# Patient Record
Sex: MALE | Race: WHITE | Employment: OTHER | URBAN - METROPOLITAN AREA
[De-identification: names, ages, dates, MRNs, and addresses within clinical notes are randomized per-mention and may not be internally consistent; named-entity substitution may affect disease eponyms.]

---

## 2017-05-25 RX ORDER — LEVOTHYROXINE SODIUM 0.1 MG/1
TABLET ORAL
Qty: 90 TABLET | Refills: 0 | Status: SHIPPED | OUTPATIENT
Start: 2017-05-25 | End: 2017-08-23 | Stop reason: SDUPTHER

## 2017-08-16 ENCOUNTER — HOSPITAL ENCOUNTER (OUTPATIENT)
Dept: GENERAL RADIOLOGY | Age: 67
Discharge: HOME | End: 2017-08-16
Payer: MEDICARE

## 2017-08-16 ENCOUNTER — OFFICE VISIT (OUTPATIENT)
Dept: FAMILY MEDICINE CLINIC | Age: 67
End: 2017-08-16

## 2017-08-16 VITALS
OXYGEN SATURATION: 97 % | HEART RATE: 64 BPM | WEIGHT: 226.8 LBS | SYSTOLIC BLOOD PRESSURE: 138 MMHG | BODY MASS INDEX: 27.62 KG/M2 | HEIGHT: 76 IN | TEMPERATURE: 97.7 F | RESPIRATION RATE: 15 BRPM | DIASTOLIC BLOOD PRESSURE: 68 MMHG

## 2017-08-16 DIAGNOSIS — J20.9 ACUTE BRONCHITIS, UNSPECIFIED ORGANISM: Primary | ICD-10-CM

## 2017-08-16 DIAGNOSIS — J20.9 ACUTE BRONCHITIS, UNSPECIFIED ORGANISM: ICD-10-CM

## 2017-08-16 DIAGNOSIS — R09.89 CHEST RALES: ICD-10-CM

## 2017-08-16 DIAGNOSIS — M54.9 MID BACK PAIN: ICD-10-CM

## 2017-08-16 PROCEDURE — 4040F PNEUMOC VAC/ADMIN/RCVD: CPT | Performed by: FAMILY MEDICINE

## 2017-08-16 PROCEDURE — G8598 ASA/ANTIPLAT THER USED: HCPCS | Performed by: FAMILY MEDICINE

## 2017-08-16 PROCEDURE — 3017F COLORECTAL CA SCREEN DOC REV: CPT | Performed by: FAMILY MEDICINE

## 2017-08-16 PROCEDURE — 99213 OFFICE O/P EST LOW 20 MIN: CPT | Performed by: FAMILY MEDICINE

## 2017-08-16 PROCEDURE — 1036F TOBACCO NON-USER: CPT | Performed by: FAMILY MEDICINE

## 2017-08-16 PROCEDURE — G8427 DOCREV CUR MEDS BY ELIG CLIN: HCPCS | Performed by: FAMILY MEDICINE

## 2017-08-16 PROCEDURE — 71020 XR CHEST STANDARD TWO VW: CPT

## 2017-08-16 PROCEDURE — 1123F ACP DISCUSS/DSCN MKR DOCD: CPT | Performed by: FAMILY MEDICINE

## 2017-08-16 PROCEDURE — G8419 CALC BMI OUT NRM PARAM NOF/U: HCPCS | Performed by: FAMILY MEDICINE

## 2017-08-16 RX ORDER — DEXTROMETHORPHAN HYDROBROMIDE AND PROMETHAZINE HYDROCHLORIDE 15; 6.25 MG/5ML; MG/5ML
5 SYRUP ORAL 4 TIMES DAILY PRN
Qty: 150 ML | Refills: 0 | Status: SHIPPED | OUTPATIENT
Start: 2017-08-16 | End: 2019-05-28 | Stop reason: SDUPTHER

## 2017-08-16 RX ORDER — RANOLAZINE 500 MG/1
TABLET, FILM COATED, EXTENDED RELEASE ORAL
COMMUNITY
Start: 2017-08-08 | End: 2019-05-03 | Stop reason: ALTCHOICE

## 2017-08-16 RX ORDER — CLARITHROMYCIN 500 MG/1
500 TABLET, COATED ORAL 2 TIMES DAILY
Qty: 20 TABLET | Refills: 0 | Status: SHIPPED | OUTPATIENT
Start: 2017-08-16 | End: 2017-08-26

## 2017-08-16 RX ORDER — CARVEDILOL 3.12 MG/1
TABLET ORAL
COMMUNITY
Start: 2017-08-07 | End: 2018-08-15 | Stop reason: ALTCHOICE

## 2017-08-16 RX ORDER — LEVOTHYROXINE SODIUM 0.1 MG/1
TABLET ORAL
Qty: 30 TABLET | Refills: 0 | Status: CANCELLED | OUTPATIENT
Start: 2017-08-16

## 2017-08-16 RX ORDER — AMLODIPINE BESYLATE 5 MG/1
TABLET ORAL
Status: ON HOLD | COMMUNITY
Start: 2017-08-07 | End: 2017-11-01 | Stop reason: HOSPADM

## 2017-08-16 ASSESSMENT — ENCOUNTER SYMPTOMS
ABDOMINAL PAIN: 0
WHEEZING: 0

## 2017-08-21 ENCOUNTER — OFFICE VISIT (OUTPATIENT)
Dept: FAMILY MEDICINE CLINIC | Age: 67
End: 2017-08-21

## 2017-08-21 VITALS
RESPIRATION RATE: 16 BRPM | BODY MASS INDEX: 27.52 KG/M2 | DIASTOLIC BLOOD PRESSURE: 80 MMHG | HEIGHT: 76 IN | WEIGHT: 226 LBS | SYSTOLIC BLOOD PRESSURE: 124 MMHG | HEART RATE: 88 BPM | TEMPERATURE: 98.1 F

## 2017-08-21 DIAGNOSIS — M19.011 PRIMARY OSTEOARTHRITIS OF BOTH SHOULDERS: ICD-10-CM

## 2017-08-21 DIAGNOSIS — Z12.5 PROSTATE CANCER SCREENING: ICD-10-CM

## 2017-08-21 DIAGNOSIS — E03.9 HYPOTHYROIDISM, UNSPECIFIED TYPE: Primary | ICD-10-CM

## 2017-08-21 DIAGNOSIS — E03.9 HYPOTHYROIDISM, UNSPECIFIED TYPE: ICD-10-CM

## 2017-08-21 DIAGNOSIS — M19.012 PRIMARY OSTEOARTHRITIS OF BOTH SHOULDERS: ICD-10-CM

## 2017-08-21 LAB
PROSTATE SPECIFIC ANTIGEN: 0.16 NG/ML (ref 0–6.22)
TSH SERPL DL<=0.05 MIU/L-ACNC: 2.24 UIU/ML (ref 0.27–4.2)

## 2017-08-21 PROCEDURE — 1123F ACP DISCUSS/DSCN MKR DOCD: CPT | Performed by: FAMILY MEDICINE

## 2017-08-21 PROCEDURE — G8419 CALC BMI OUT NRM PARAM NOF/U: HCPCS | Performed by: FAMILY MEDICINE

## 2017-08-21 PROCEDURE — 4040F PNEUMOC VAC/ADMIN/RCVD: CPT | Performed by: FAMILY MEDICINE

## 2017-08-21 PROCEDURE — 3017F COLORECTAL CA SCREEN DOC REV: CPT | Performed by: FAMILY MEDICINE

## 2017-08-21 PROCEDURE — 99213 OFFICE O/P EST LOW 20 MIN: CPT | Performed by: FAMILY MEDICINE

## 2017-08-21 PROCEDURE — 1036F TOBACCO NON-USER: CPT | Performed by: FAMILY MEDICINE

## 2017-08-21 PROCEDURE — G8427 DOCREV CUR MEDS BY ELIG CLIN: HCPCS | Performed by: FAMILY MEDICINE

## 2017-08-21 PROCEDURE — G8598 ASA/ANTIPLAT THER USED: HCPCS | Performed by: FAMILY MEDICINE

## 2017-08-21 ASSESSMENT — PATIENT HEALTH QUESTIONNAIRE - PHQ9
SUM OF ALL RESPONSES TO PHQ QUESTIONS 1-9: 0
SUM OF ALL RESPONSES TO PHQ9 QUESTIONS 1 & 2: 0
1. LITTLE INTEREST OR PLEASURE IN DOING THINGS: 0
2. FEELING DOWN, DEPRESSED OR HOPELESS: 0

## 2017-08-21 ASSESSMENT — ENCOUNTER SYMPTOMS
GASTROINTESTINAL NEGATIVE: 1
ALLERGIC/IMMUNOLOGIC NEGATIVE: 1
RESPIRATORY NEGATIVE: 1
EYES NEGATIVE: 1
SHORTNESS OF BREATH: 0

## 2017-08-23 RX ORDER — LEVOTHYROXINE SODIUM 0.1 MG/1
100 TABLET ORAL DAILY
Qty: 90 TABLET | Refills: 0 | Status: SHIPPED | OUTPATIENT
Start: 2017-08-23 | End: 2018-01-20 | Stop reason: SDUPTHER

## 2017-10-17 ENCOUNTER — OFFICE VISIT (OUTPATIENT)
Dept: FAMILY MEDICINE CLINIC | Age: 67
End: 2017-10-17

## 2017-10-17 VITALS
DIASTOLIC BLOOD PRESSURE: 84 MMHG | RESPIRATION RATE: 18 BRPM | WEIGHT: 228 LBS | BODY MASS INDEX: 27.76 KG/M2 | HEIGHT: 76 IN | SYSTOLIC BLOOD PRESSURE: 132 MMHG | HEART RATE: 74 BPM | TEMPERATURE: 98.1 F | OXYGEN SATURATION: 97 %

## 2017-10-17 DIAGNOSIS — R06.02 SOB (SHORTNESS OF BREATH) ON EXERTION: ICD-10-CM

## 2017-10-17 DIAGNOSIS — R05.9 COUGH: Primary | ICD-10-CM

## 2017-10-17 PROCEDURE — 1036F TOBACCO NON-USER: CPT | Performed by: FAMILY MEDICINE

## 2017-10-17 PROCEDURE — G8484 FLU IMMUNIZE NO ADMIN: HCPCS | Performed by: FAMILY MEDICINE

## 2017-10-17 PROCEDURE — G8427 DOCREV CUR MEDS BY ELIG CLIN: HCPCS | Performed by: FAMILY MEDICINE

## 2017-10-17 PROCEDURE — 1123F ACP DISCUSS/DSCN MKR DOCD: CPT | Performed by: FAMILY MEDICINE

## 2017-10-17 PROCEDURE — 3017F COLORECTAL CA SCREEN DOC REV: CPT | Performed by: FAMILY MEDICINE

## 2017-10-17 PROCEDURE — 4040F PNEUMOC VAC/ADMIN/RCVD: CPT | Performed by: FAMILY MEDICINE

## 2017-10-17 PROCEDURE — 99213 OFFICE O/P EST LOW 20 MIN: CPT | Performed by: FAMILY MEDICINE

## 2017-10-17 PROCEDURE — G8598 ASA/ANTIPLAT THER USED: HCPCS | Performed by: FAMILY MEDICINE

## 2017-10-17 PROCEDURE — G8419 CALC BMI OUT NRM PARAM NOF/U: HCPCS | Performed by: FAMILY MEDICINE

## 2017-10-17 RX ORDER — PREDNISONE 10 MG/1
10 TABLET ORAL DAILY
Qty: 40 TABLET | Refills: 0 | Status: SHIPPED | OUTPATIENT
Start: 2017-10-17 | End: 2017-10-27

## 2017-10-17 RX ORDER — CEFDINIR 300 MG/1
300 CAPSULE ORAL 2 TIMES DAILY
Qty: 20 CAPSULE | Refills: 0 | Status: SHIPPED | OUTPATIENT
Start: 2017-10-17 | End: 2019-05-28 | Stop reason: SDUPTHER

## 2017-10-17 ASSESSMENT — ENCOUNTER SYMPTOMS
COUGH: 1
EYES NEGATIVE: 1
GASTROINTESTINAL NEGATIVE: 1
ALLERGIC/IMMUNOLOGIC NEGATIVE: 1
SHORTNESS OF BREATH: 1

## 2017-10-17 NOTE — PROGRESS NOTES
Patient is seen in follow up for   Chief Complaint   Patient presents with    Cough     x 3 months     Chest Congestion     HPIhere with cough and sob for the last three months. cxr was normal. Felt better with antibiotics    Past Medical History:   Diagnosis Date    CAD (coronary artery disease)     Congenital heart disease     Knee pain      Patient Active Problem List    Diagnosis Date Noted    CAD (coronary artery disease)     Congenital heart disease     Knee pain      Past Surgical History:   Procedure Laterality Date    CARDIAC CATHETERIZATION  05/13/16    DR Dennie Dada    VASECTOMY       No family history on file. Social History     Social History    Marital status:      Spouse name: N/A    Number of children: N/A    Years of education: N/A     Social History Main Topics    Smoking status: Former Smoker     Packs/day: 1.50     Years: 20.00     Types: Cigarettes    Smokeless tobacco: Never Used    Alcohol use 1.2 oz/week     1 Cans of beer, 1 Glasses of wine per week      Comment: rarely     Drug use: No    Sexual activity: Not Asked     Other Topics Concern    None     Social History Narrative    None     Current Outpatient Prescriptions   Medication Sig Dispense Refill    cefdinir (OMNICEF) 300 MG capsule Take 1 capsule by mouth 2 times daily for 10 days 20 capsule 0    predniSONE (DELTASONE) 10 MG tablet Take 1 tablet by mouth daily for 10 days 4 po for 4 days 3 po for 4 days 2 po for 4 days 1 po for 4 days 40 tablet 0    levothyroxine (SYNTHROID) 100 MCG tablet Take 1 tablet by mouth daily 90 tablet 0    carvedilol (COREG) 3.125 MG tablet 1 po bid      RANEXA 500 MG extended release tablet 1 po bid      amLODIPine (NORVASC) 5 MG tablet 1 po qd      PRALUENT 75 MG/ML SOPN injection pen Inject 75 mg once every other week      Ascorbic Acid (VITAMIN C) 500 MG tablet Take 500 mg by mouth daily      Multiple Vitamins-Minerals (CENTRUM SILVER ULTRA MENS PO) Take  by mouth.       aspirin 81 MG tablet Take 81 mg by mouth daily.  vitamin B-12 (CYANOCOBALAMIN) 1000 MCG tablet Take 1,000 mcg by mouth daily.  niacin (NIASPAN) 1000 MG CR tablet Take 1,000 mg by mouth nightly. No current facility-administered medications for this visit. Current Outpatient Prescriptions on File Prior to Visit   Medication Sig Dispense Refill    levothyroxine (SYNTHROID) 100 MCG tablet Take 1 tablet by mouth daily 90 tablet 0    carvedilol (COREG) 3.125 MG tablet 1 po bid      RANEXA 500 MG extended release tablet 1 po bid      amLODIPine (NORVASC) 5 MG tablet 1 po qd      PRALUENT 75 MG/ML SOPN injection pen Inject 75 mg once every other week      Ascorbic Acid (VITAMIN C) 500 MG tablet Take 500 mg by mouth daily      Multiple Vitamins-Minerals (CENTRUM SILVER ULTRA MENS PO) Take  by mouth.  aspirin 81 MG tablet Take 81 mg by mouth daily.  vitamin B-12 (CYANOCOBALAMIN) 1000 MCG tablet Take 1,000 mcg by mouth daily.  niacin (NIASPAN) 1000 MG CR tablet Take 1,000 mg by mouth nightly. No current facility-administered medications on file prior to visit. Allergies   Allergen Reactions    Celebrex [Celecoxib]     Crestor [Rosuvastatin]     Simvastatin      Health Maintenance   Topic Date Due    AAA screen  1950    Hepatitis C screen  1950    DTaP/Tdap/Td vaccine (1 - Tdap) 08/10/1967    Flu vaccine (1) 09/01/2017    Lipid screen  05/04/2021    Colon cancer screen colonoscopy  02/18/2025    Zostavax vaccine  Completed    Pneumococcal low/med risk  Completed       Review of Systems    Review of Systems   Constitutional: Negative for activity change, appetite change, chills, fever and unexpected weight change. HENT: Negative. Eyes: Negative. Respiratory: Positive for cough and shortness of breath. Cardiovascular: Negative. Negative for chest pain and palpitations. Gastrointestinal: Negative. Endocrine: Negative. Genitourinary: Negative. Musculoskeletal: Negative. Skin: Negative. Allergic/Immunologic: Negative. Neurological: Negative. Hematological: Negative. Psychiatric/Behavioral: Negative. Physical Exam  Vitals:    10/17/17 1930   BP: 132/84   Pulse: 74   Resp: 18   Temp: 98.1 °F (36.7 °C)   TempSrc: Tympanic   SpO2: 97%   Weight: 228 lb (103.4 kg)   Height: 6' 4\" (1.93 m)       Physical Exam   Constitutional: He appears well-developed and well-nourished. HENT:   Right Ear: External ear normal.   Left Ear: External ear normal.   Eyes: Conjunctivae are normal. Pupils are equal, round, and reactive to light. Neck: Normal range of motion. Neck supple. No thyromegaly present. Cardiovascular: Normal rate, regular rhythm and normal heart sounds. No murmur heard. Pulmonary/Chest: Effort normal and breath sounds normal.   Abdominal: Soft. Bowel sounds are normal.   Musculoskeletal: Normal range of motion. He exhibits no edema or tenderness. Lymphadenopathy:     He has no cervical adenopathy. Assessment  1. Cough     2. SOB (shortness of breath) on exertion       Problem List     None          Plan  No orders of the defined types were placed in this encounter.     Orders Placed This Encounter   Medications    cefdinir (OMNICEF) 300 MG capsule     Sig: Take 1 capsule by mouth 2 times daily for 10 days     Dispense:  20 capsule     Refill:  0    predniSONE (DELTASONE) 10 MG tablet     Sig: Take 1 tablet by mouth daily for 10 days 4 po for 4 days 3 po for 4 days 2 po for 4 days 1 po for 4 days     Dispense:  40 tablet     Refill:  0     Suspect sinusitis aor allergies contributing  Esther Bustamante MD

## 2017-10-26 ENCOUNTER — PATIENT MESSAGE (OUTPATIENT)
Dept: FAMILY MEDICINE CLINIC | Age: 67
End: 2017-10-26

## 2017-10-26 DIAGNOSIS — R05.9 COUGH: Primary | ICD-10-CM

## 2017-10-26 NOTE — TELEPHONE ENCOUNTER
From: Todd Finch  To: Caridad Jaimes MD  Sent: 10/26/2017 8:48 AM EDT  Subject: RE:Thank you for your visit! [place]  I was wondering if you could go ahead and schedule me for the follow up chest xray? I was not able to take the Prednisone as seemed to increase the chest pain. I will be finishing the Cefdinir tomorrow. I am still getting the chest pains when I over exert myself. I am going to get an appointment with Dr. Radha Morton to have her check out my heart. Thanks  Bonny Howard  ----- Message -----  From: Caridad Jaimes MD  Sent: 10/17/2017 8:00 PM EDT  To: Petros Kaye  Subject: Thank you for your visit!  10/17/2017   Caridad Jaimes MD   [address] [place] [id number]  Dept: [phone]  Dept Fax: [phone]: [phone]     Dear Hamilton Tomlinson,  Thank you for your recent visit. We at [facility] are committed to providing amazing patient care, and would like to make sure we were successful in providing you a great experience at our office. In the coming days, you may receive a survey via mail or email about your experience with my office. We ask that you please take a couple of minutes to complete and return it. We use our patients feedback to make improvements within the office that will make the experience even better for all of our patients. Thank you, and be well!   Caridad Jaimes MD

## 2017-10-28 ENCOUNTER — APPOINTMENT (OUTPATIENT)
Dept: GENERAL RADIOLOGY | Age: 67
DRG: 247 | End: 2017-10-28
Payer: MEDICARE

## 2017-10-28 ENCOUNTER — HOSPITAL ENCOUNTER (EMERGENCY)
Age: 67
Discharge: HOME | DRG: 247 | End: 2017-10-29
Payer: MEDICARE

## 2017-10-28 DIAGNOSIS — R07.9 CHEST PAIN, UNSPECIFIED TYPE: ICD-10-CM

## 2017-10-28 DIAGNOSIS — K22.4 ESOPHAGEAL SPASM: Primary | ICD-10-CM

## 2017-10-28 LAB
ALBUMIN SERPL-MCNC: 4.5 G/DL (ref 3.9–4.9)
ALP BLD-CCNC: 70 U/L (ref 35–104)
ALT SERPL-CCNC: 34 U/L (ref 0–41)
ANION GAP SERPL CALCULATED.3IONS-SCNC: 15 MEQ/L (ref 7–13)
AST SERPL-CCNC: 23 U/L (ref 0–40)
BASOPHILS ABSOLUTE: 0.1 K/UL (ref 0–0.2)
BASOPHILS RELATIVE PERCENT: 0.9 %
BILIRUB SERPL-MCNC: 0.4 MG/DL (ref 0–1.2)
BUN BLDV-MCNC: 17 MG/DL (ref 8–23)
CALCIUM SERPL-MCNC: 9.7 MG/DL (ref 8.6–10.2)
CHLORIDE BLD-SCNC: 98 MEQ/L (ref 98–107)
CO2: 23 MEQ/L (ref 22–29)
CREAT SERPL-MCNC: 1.8 MG/DL (ref 0.7–1.2)
EOSINOPHILS ABSOLUTE: 0.2 K/UL (ref 0–0.7)
EOSINOPHILS RELATIVE PERCENT: 2.5 %
GFR AFRICAN AMERICAN: 45.5
GFR NON-AFRICAN AMERICAN: 37.6
GLOBULIN: 3.1 G/DL (ref 2.3–3.5)
GLUCOSE BLD-MCNC: 104 MG/DL (ref 74–109)
HCT VFR BLD CALC: 39.3 % (ref 42–52)
HEMOGLOBIN: 13.5 G/DL (ref 14–18)
LACTIC ACID: 1.2 MMOL/L (ref 0.5–2.2)
LYMPHOCYTES ABSOLUTE: 2.1 K/UL (ref 1–4.8)
LYMPHOCYTES RELATIVE PERCENT: 23.7 %
MAGNESIUM: 2.2 MG/DL (ref 1.7–2.3)
MCH RBC QN AUTO: 30 PG (ref 27–31.3)
MCHC RBC AUTO-ENTMCNC: 34.5 % (ref 33–37)
MCV RBC AUTO: 87.1 FL (ref 80–100)
MONOCYTES ABSOLUTE: 1.1 K/UL (ref 0.2–0.8)
MONOCYTES RELATIVE PERCENT: 12.5 %
NEUTROPHILS ABSOLUTE: 5.5 K/UL (ref 1.4–6.5)
NEUTROPHILS RELATIVE PERCENT: 60.4 %
PDW BLD-RTO: 12.8 % (ref 11.5–14.5)
PLATELET # BLD: 251 K/UL (ref 130–400)
POTASSIUM SERPL-SCNC: 4.3 MEQ/L (ref 3.5–5.1)
RBC # BLD: 4.51 M/UL (ref 4.7–6.1)
SODIUM BLD-SCNC: 136 MEQ/L (ref 132–144)
TOTAL CK: 212 U/L (ref 0–190)
TOTAL PROTEIN: 7.6 G/DL (ref 6.4–8.1)
TROPONIN: <0.01 NG/ML (ref 0–0.01)
WBC # BLD: 9 K/UL (ref 4.8–10.8)

## 2017-10-28 PROCEDURE — 82553 CREATINE MB FRACTION: CPT

## 2017-10-28 PROCEDURE — 93005 ELECTROCARDIOGRAM TRACING: CPT

## 2017-10-28 PROCEDURE — 71010 XR CHEST PORTABLE: CPT

## 2017-10-28 PROCEDURE — 82550 ASSAY OF CK (CPK): CPT

## 2017-10-28 PROCEDURE — 84484 ASSAY OF TROPONIN QUANT: CPT

## 2017-10-28 PROCEDURE — 83735 ASSAY OF MAGNESIUM: CPT

## 2017-10-28 PROCEDURE — 36415 COLL VENOUS BLD VENIPUNCTURE: CPT

## 2017-10-28 PROCEDURE — 99285 EMERGENCY DEPT VISIT HI MDM: CPT

## 2017-10-28 PROCEDURE — 83605 ASSAY OF LACTIC ACID: CPT

## 2017-10-28 PROCEDURE — 85025 COMPLETE CBC W/AUTO DIFF WBC: CPT

## 2017-10-28 PROCEDURE — 80053 COMPREHEN METABOLIC PANEL: CPT

## 2017-10-28 ASSESSMENT — PAIN DESCRIPTION - LOCATION: LOCATION: CHEST

## 2017-10-28 ASSESSMENT — PAIN SCALES - GENERAL: PAINLEVEL_OUTOF10: 2

## 2017-10-28 ASSESSMENT — PAIN DESCRIPTION - ORIENTATION: ORIENTATION: MID;LEFT

## 2017-10-28 ASSESSMENT — PAIN DESCRIPTION - DESCRIPTORS: DESCRIPTORS: TIGHTNESS

## 2017-10-28 ASSESSMENT — PAIN DESCRIPTION - FREQUENCY: FREQUENCY: INTERMITTENT

## 2017-10-29 VITALS
OXYGEN SATURATION: 96 % | TEMPERATURE: 98 F | HEART RATE: 55 BPM | WEIGHT: 225 LBS | RESPIRATION RATE: 18 BRPM | DIASTOLIC BLOOD PRESSURE: 64 MMHG | HEIGHT: 76 IN | BODY MASS INDEX: 27.4 KG/M2 | SYSTOLIC BLOOD PRESSURE: 110 MMHG

## 2017-10-29 LAB
BILIRUBIN URINE: NEGATIVE
BLOOD, URINE: NEGATIVE
CK MB: 4.8 NG/ML (ref 0–6.7)
CK MB: 4.9 NG/ML (ref 0–6.7)
CLARITY: CLEAR
COLOR: YELLOW
CREATINE KINASE-MB INDEX: 2.3 % (ref 0–3.5)
CREATINE KINASE-MB INDEX: 2.4 % (ref 0–3.5)
EKG ATRIAL RATE: 53 BPM
EKG ATRIAL RATE: 63 BPM
EKG P AXIS: 40 DEGREES
EKG P AXIS: 48 DEGREES
EKG P-R INTERVAL: 204 MS
EKG P-R INTERVAL: 204 MS
EKG Q-T INTERVAL: 410 MS
EKG Q-T INTERVAL: 422 MS
EKG QRS DURATION: 80 MS
EKG QRS DURATION: 86 MS
EKG QTC CALCULATION (BAZETT): 395 MS
EKG QTC CALCULATION (BAZETT): 419 MS
EKG R AXIS: 36 DEGREES
EKG R AXIS: 42 DEGREES
EKG T AXIS: -2 DEGREES
EKG T AXIS: 10 DEGREES
EKG VENTRICULAR RATE: 53 BPM
EKG VENTRICULAR RATE: 63 BPM
GLUCOSE URINE: NEGATIVE MG/DL
KETONES, URINE: NEGATIVE MG/DL
LEUKOCYTE ESTERASE, URINE: NEGATIVE
NITRITE, URINE: NEGATIVE
PH UA: 6 (ref 5–9)
PROTEIN UA: NEGATIVE MG/DL
SPECIFIC GRAVITY UA: 1.01 (ref 1–1.03)
TOTAL CK: 200 U/L (ref 0–190)
TROPONIN: <0.01 NG/ML (ref 0–0.01)
URINE REFLEX TO CULTURE: NORMAL
UROBILINOGEN, URINE: 0.2 E.U./DL

## 2017-10-29 PROCEDURE — 36415 COLL VENOUS BLD VENIPUNCTURE: CPT

## 2017-10-29 PROCEDURE — 81003 URINALYSIS AUTO W/O SCOPE: CPT

## 2017-10-29 PROCEDURE — 82553 CREATINE MB FRACTION: CPT

## 2017-10-29 PROCEDURE — 84484 ASSAY OF TROPONIN QUANT: CPT

## 2017-10-29 PROCEDURE — 82550 ASSAY OF CK (CPK): CPT

## 2017-10-29 RX ORDER — OMEPRAZOLE 20 MG/1
20 CAPSULE, DELAYED RELEASE ORAL DAILY
Qty: 30 CAPSULE | Refills: 0 | Status: SHIPPED | OUTPATIENT
Start: 2017-10-29 | End: 2019-05-03 | Stop reason: ALTCHOICE

## 2017-10-29 RX ORDER — SUCRALFATE ORAL 1 G/10ML
1 SUSPENSION ORAL 4 TIMES DAILY
Qty: 400 ML | Refills: 0 | Status: SHIPPED | OUTPATIENT
Start: 2017-10-29 | End: 2018-08-15

## 2017-10-29 ASSESSMENT — ENCOUNTER SYMPTOMS
COUGH: 1
COLOR CHANGE: 0
APNEA: 0
SHORTNESS OF BREATH: 0
TROUBLE SWALLOWING: 0
ALLERGIC/IMMUNOLOGIC NEGATIVE: 1
EYE PAIN: 0
ABDOMINAL PAIN: 0
CHEST TIGHTNESS: 1

## 2017-10-29 NOTE — ED PROVIDER NOTES
[facility] ED  eMERGENCY dEPARTMENT eNCOUnter      Pt Name: Josy Kirk  MRN: [id number]  [place] 1950  Date of evaluation: 10/28/2017  Provider: Lesvia Keyes PA-C    CHIEF COMPLAINT       Chief Complaint   Patient presents with    Chest Pain         HISTORY OF PRESENT ILLNESS   (Location/Symptom, Timing/Onset, Context/Setting, Quality, Duration, Modifying Factors, Severity)  Note limiting factors. Josy Kirk is a 69 y.o. male who presents to the emergency department With complaint of three-week history of chest pain and cough. Patient states the symptoms been intermittent and he has seen his doctor for the same and was (steroids and antibiotics for suspected bronchitis. Patient states that he took steroids for 2 days and felt like the symptoms got worse so he stopped taking the steroids every continue taking antibiotics and. Patient states that the pain starts in the center of the chest and spreads diffusely and bilaterally through the entire chest.  Patient states that when he gets the cough he feels like it's in the upper portion of his chest in the midline as well as throat and occasionally when he coughs the symptoms of the pain away. Patient does state that the symptoms seem to get worse with activity and improved breast.  Patient states the pain is reproducible with movement of his arms patient denies any abdominal pain, nausea, vomiting or diarrhea. She denies any headaches or dizziness. Patient denies any pain in his back. Patient denies any pain with deep breathing. HPI    Nursing Notes were reviewed. REVIEW OF SYSTEMS    (2-9 systems for level 4, 10 or more for level 5)     Review of Systems   Constitutional: Negative for diaphoresis and fever. HENT: Negative for hearing loss and trouble swallowing. Eyes: Negative for pain. Respiratory: Positive for cough and chest tightness. Negative for apnea and shortness of breath.     Cardiovascular: Positive for chest (*)     Hemoglobin 13.5 (*)     Hematocrit 39.3 (*)     Monocytes # 1.1 (*)     All other components within normal limits   CK - Abnormal; Notable for the following: Total  (*)     All other components within normal limits   CK - Abnormal; Notable for the following: Total  (*)     All other components within normal limits   LACTIC ACID, PLASMA   TROPONIN   MAGNESIUM   CKMB & RELATIVE PERCENT   TROPONIN   URINE RT REFLEX TO CULTURE   CKMB & RELATIVE PERCENT       All other labs were within normal range or not returned as of this dictation. EMERGENCY DEPARTMENT COURSE and DIFFERENTIAL DIAGNOSIS/MDM:   Vitals:    Vitals:    10/28/17 2155 10/29/17 0006   BP: (!) 201/84 110/64   Pulse: 64 55   Resp: 18 18   Temp: 98 °F (36.7 °C)    SpO2: 99% 96%   Weight: 225 lb (102.1 kg)    Height: 6' 4\" (1.93 m)            MDM      REASSESSMENT     ED Course      Patient presented with complaints of cough and exertional chest pain and chest pain with certain movement of his arms. I believe that the patient's cough is secondary to reflux and esophagitis/esophageal spasms and recommending he follow up with gastroenterology for further evaluation of this. Patient had 2 sets of cardiac enzymes are negative as well as 2 normal EKGs while in the emergency department, and patient has a follow-up already scheduled with his cardiologist for Thursday. This pain does not seem to be cardiac in nature and seems to be more chest wall and esophageal as described above. I'll place the patient Carafate as well as omeprazole and anti-inflammatories for the chest and advised that he call for follow-up with his cardiologist on Monday as well as following up with GI as discussed above as well. Return for any worsening symptoms    CONSULTS:  None    PROCEDURES:  Unless otherwise noted below, none     Procedures    FINAL IMPRESSION      1. Esophageal spasm    2.  Chest pain, unspecified type          DISPOSITION/PLAN   DISPOSITION Decision to Discharge    PATIENT REFERRED TO:  Christiano Sandoval MD  [address] [facility]  [phone]    Call in 2 days      Mickey Galeano MD  [address] [facility]  [phone]  [place] [phone]    Call in 2 days      Lynne Pierre MD  [address] [facility]  [address] [facility]  [place] [address]  [phone]    Call in 2 days        DISCHARGE MEDICATIONS:  New Prescriptions    OMEPRAZOLE (PRILOSEC) 20 MG DELAYED RELEASE CAPSULE    Take 1 capsule by mouth daily    SUCRALFATE (CARAFATE) 1 GM/10ML SUSPENSION    Take 10 mLs by mouth 4 times daily          (Please note that portions of this note were completed with a voice recognition program.  Efforts were made to edit the dictations but occasionally words are mis-transcribed.)    Gissell Maher PA-C (electronically signed)  Attending Emergency Physician          Gissell Maher PA-C  10/29/17 0144

## 2017-10-29 NOTE — ED NOTES
D/c instructions given, #20 IV from [facility] removed     Rosa Leiva RN  10/29/17 2847

## 2017-10-30 ENCOUNTER — HOSPITAL ENCOUNTER (INPATIENT)
Age: 67
LOS: 2 days | Discharge: HOME | DRG: 247 | End: 2017-11-01
Attending: EMERGENCY MEDICINE | Admitting: INTERNAL MEDICINE
Payer: MEDICARE

## 2017-10-30 ENCOUNTER — APPOINTMENT (OUTPATIENT)
Dept: GENERAL RADIOLOGY | Age: 67
DRG: 247 | End: 2017-10-30
Payer: MEDICARE

## 2017-10-30 DIAGNOSIS — I21.01 ACUTE ST ELEVATION MYOCARDIAL INFARCTION (STEMI) INVOLVING LEFT MAIN CORONARY ARTERY (HCC): ICD-10-CM

## 2017-10-30 DIAGNOSIS — I21.02 ST ELEVATION MYOCARDIAL INFARCTION INVOLVING LEFT ANTERIOR DESCENDING (LAD) CORONARY ARTERY (HCC): Primary | ICD-10-CM

## 2017-10-30 LAB
ALBUMIN SERPL-MCNC: 4.3 G/DL (ref 3.9–4.9)
ALP BLD-CCNC: 69 U/L (ref 35–104)
ALT SERPL-CCNC: 31 U/L (ref 0–41)
ANION GAP SERPL CALCULATED.3IONS-SCNC: 17 MEQ/L (ref 7–13)
AST SERPL-CCNC: 21 U/L (ref 0–40)
BASOPHILS ABSOLUTE: 0.1 K/UL (ref 0–0.2)
BASOPHILS RELATIVE PERCENT: 0.4 %
BILIRUB SERPL-MCNC: 0.7 MG/DL (ref 0–1.2)
BUN BLDV-MCNC: 18 MG/DL (ref 8–23)
CALCIUM SERPL-MCNC: 9.6 MG/DL (ref 8.6–10.2)
CHLORIDE BLD-SCNC: 100 MEQ/L (ref 98–107)
CK MB: 130.9 NG/ML (ref 0–6.7)
CO2: 20 MEQ/L (ref 22–29)
CREAT SERPL-MCNC: 1.15 MG/DL (ref 0.7–1.2)
CREATINE KINASE-MB INDEX: 10.1 % (ref 0–3.5)
EOSINOPHILS ABSOLUTE: 0.2 K/UL (ref 0–0.7)
EOSINOPHILS RELATIVE PERCENT: 1.8 %
GFR AFRICAN AMERICAN: >60
GFR AFRICAN AMERICAN: >60
GFR NON-AFRICAN AMERICAN: >60
GFR NON-AFRICAN AMERICAN: >60
GLOBULIN: 2.9 G/DL (ref 2.3–3.5)
GLUCOSE BLD-MCNC: 142 MG/DL (ref 60–115)
GLUCOSE BLD-MCNC: 151 MG/DL (ref 74–109)
HCT VFR BLD CALC: 41.5 % (ref 42–52)
HEMOGLOBIN: 13.9 G/DL (ref 14–18)
INR BLD: 1
LV EF: 65 %
LVEF MODALITY: NORMAL
LYMPHOCYTES ABSOLUTE: 3.2 K/UL (ref 1–4.8)
LYMPHOCYTES RELATIVE PERCENT: 24.4 %
MCH RBC QN AUTO: 29.9 PG (ref 27–31.3)
MCHC RBC AUTO-ENTMCNC: 33.5 % (ref 33–37)
MCV RBC AUTO: 89.4 FL (ref 80–100)
MONOCYTES ABSOLUTE: 1.3 K/UL (ref 0.2–0.8)
MONOCYTES RELATIVE PERCENT: 10.1 %
NEUTROPHILS ABSOLUTE: 8.4 K/UL (ref 1.4–6.5)
NEUTROPHILS RELATIVE PERCENT: 63.3 %
PDW BLD-RTO: 12.6 % (ref 11.5–14.5)
PERFORMED ON: ABNORMAL
PERFORMED ON: NORMAL
PERFORMED ON: NORMAL
PLATELET # BLD: 228 K/UL (ref 130–400)
POC CREATININE: 1.1 MG/DL (ref 0.8–1.3)
POC POTASSIUM: 4.4 MEQ/L (ref 3.5–5.1)
POC SAMPLE TYPE: NORMAL
POC SAMPLE TYPE: NORMAL
POTASSIUM SERPL-SCNC: 4.6 MEQ/L (ref 3.5–5.1)
PROTHROMBIN TIME: 10.5 SEC (ref 8.1–13.7)
RBC # BLD: 4.65 M/UL (ref 4.7–6.1)
SODIUM BLD-SCNC: 137 MEQ/L (ref 132–144)
TOTAL CK: 1301 U/L (ref 0–190)
TOTAL PROTEIN: 7.2 G/DL (ref 6.4–8.1)
TROPONIN: 2.51 NG/ML (ref 0–0.01)
TROPONIN: <0.01 NG/ML (ref 0–0.01)
WBC # BLD: 13.3 K/UL (ref 4.8–10.8)

## 2017-10-30 PROCEDURE — 2720000001 HC MISC SURG SUPPLY STERILE $51-500: Performed by: NURSE PRACTITIONER

## 2017-10-30 PROCEDURE — 6360000002 HC RX W HCPCS: Performed by: EMERGENCY MEDICINE

## 2017-10-30 PROCEDURE — 93458 L HRT ARTERY/VENTRICLE ANGIO: CPT | Performed by: INTERNAL MEDICINE

## 2017-10-30 PROCEDURE — C1760 CLOSURE DEV, VASC: HCPCS | Performed by: NURSE PRACTITIONER

## 2017-10-30 PROCEDURE — C1894 INTRO/SHEATH, NON-LASER: HCPCS | Performed by: NURSE PRACTITIONER

## 2017-10-30 PROCEDURE — 99223 1ST HOSP IP/OBS HIGH 75: CPT | Performed by: INTERNAL MEDICINE

## 2017-10-30 PROCEDURE — 71010 XR CHEST PORTABLE: CPT

## 2017-10-30 PROCEDURE — 2580000003 HC RX 258

## 2017-10-30 PROCEDURE — C1887 CATHETER, GUIDING: HCPCS | Performed by: NURSE PRACTITIONER

## 2017-10-30 PROCEDURE — 96375 TX/PRO/DX INJ NEW DRUG ADDON: CPT

## 2017-10-30 PROCEDURE — 6370000000 HC RX 637 (ALT 250 FOR IP): Performed by: EMERGENCY MEDICINE

## 2017-10-30 PROCEDURE — 36415 COLL VENOUS BLD VENIPUNCTURE: CPT

## 2017-10-30 PROCEDURE — 6370000000 HC RX 637 (ALT 250 FOR IP): Performed by: INTERNAL MEDICINE

## 2017-10-30 PROCEDURE — 2580000003 HC RX 258: Performed by: INTERNAL MEDICINE

## 2017-10-30 PROCEDURE — 2000000000 HC ICU R&B

## 2017-10-30 PROCEDURE — 93005 ELECTROCARDIOGRAM TRACING: CPT

## 2017-10-30 PROCEDURE — 6370000000 HC RX 637 (ALT 250 FOR IP): Performed by: NURSE PRACTITIONER

## 2017-10-30 PROCEDURE — B2151ZZ FLUOROSCOPY OF LEFT HEART USING LOW OSMOLAR CONTRAST: ICD-10-PCS | Performed by: INTERNAL MEDICINE

## 2017-10-30 PROCEDURE — 027035Z DILATION OF CORONARY ARTERY, ONE ARTERY WITH TWO DRUG-ELUTING INTRALUMINAL DEVICES, PERCUTANEOUS APPROACH: ICD-10-PCS | Performed by: INTERNAL MEDICINE

## 2017-10-30 PROCEDURE — 82550 ASSAY OF CK (CPK): CPT

## 2017-10-30 PROCEDURE — 85025 COMPLETE CBC W/AUTO DIFF WBC: CPT

## 2017-10-30 PROCEDURE — 93010 ELECTROCARDIOGRAM REPORT: CPT | Performed by: INTERNAL MEDICINE

## 2017-10-30 PROCEDURE — 99222 1ST HOSP IP/OBS MODERATE 55: CPT | Performed by: INTERNAL MEDICINE

## 2017-10-30 PROCEDURE — C1874 STENT, COATED/COV W/DEL SYS: HCPCS | Performed by: NURSE PRACTITIONER

## 2017-10-30 PROCEDURE — 92941 PRQ TRLML REVSC TOT OCCL AMI: CPT | Performed by: INTERNAL MEDICINE

## 2017-10-30 PROCEDURE — C1769 GUIDE WIRE: HCPCS | Performed by: NURSE PRACTITIONER

## 2017-10-30 PROCEDURE — C1725 CATH, TRANSLUMIN NON-LASER: HCPCS | Performed by: NURSE PRACTITIONER

## 2017-10-30 PROCEDURE — 82948 REAGENT STRIP/BLOOD GLUCOSE: CPT

## 2017-10-30 PROCEDURE — 84484 ASSAY OF TROPONIN QUANT: CPT

## 2017-10-30 PROCEDURE — 96374 THER/PROPH/DIAG INJ IV PUSH: CPT

## 2017-10-30 PROCEDURE — 6360000002 HC RX W HCPCS: Performed by: NURSE PRACTITIONER

## 2017-10-30 PROCEDURE — 2500000003 HC RX 250 WO HCPCS

## 2017-10-30 PROCEDURE — 99285 EMERGENCY DEPT VISIT HI MDM: CPT

## 2017-10-30 PROCEDURE — 6360000002 HC RX W HCPCS

## 2017-10-30 PROCEDURE — 82553 CREATINE MB FRACTION: CPT

## 2017-10-30 PROCEDURE — 84132 ASSAY OF SERUM POTASSIUM: CPT

## 2017-10-30 PROCEDURE — 80053 COMPREHEN METABOLIC PANEL: CPT

## 2017-10-30 PROCEDURE — 6360000002 HC RX W HCPCS: Performed by: INTERNAL MEDICINE

## 2017-10-30 PROCEDURE — B2111ZZ FLUOROSCOPY OF MULTIPLE CORONARY ARTERIES USING LOW OSMOLAR CONTRAST: ICD-10-PCS | Performed by: INTERNAL MEDICINE

## 2017-10-30 PROCEDURE — 93306 TTE W/DOPPLER COMPLETE: CPT

## 2017-10-30 PROCEDURE — 85610 PROTHROMBIN TIME: CPT

## 2017-10-30 RX ORDER — LABETALOL HYDROCHLORIDE 5 MG/ML
10 INJECTION, SOLUTION INTRAVENOUS EVERY 30 MIN PRN
Status: DISCONTINUED | OUTPATIENT
Start: 2017-10-30 | End: 2017-11-01 | Stop reason: HOSPADM

## 2017-10-30 RX ORDER — HEPARIN SODIUM 5000 [USP'U]/ML
4000 INJECTION, SOLUTION INTRAVENOUS; SUBCUTANEOUS ONCE
Status: COMPLETED | OUTPATIENT
Start: 2017-10-30 | End: 2017-10-30

## 2017-10-30 RX ORDER — CARVEDILOL 3.12 MG/1
3.12 TABLET ORAL 2 TIMES DAILY
Status: DISCONTINUED | OUTPATIENT
Start: 2017-10-30 | End: 2017-11-01 | Stop reason: HOSPADM

## 2017-10-30 RX ORDER — ONDANSETRON 2 MG/ML
4 INJECTION INTRAMUSCULAR; INTRAVENOUS EVERY 6 HOURS PRN
Status: DISCONTINUED | OUTPATIENT
Start: 2017-10-30 | End: 2017-11-01 | Stop reason: HOSPADM

## 2017-10-30 RX ORDER — ASPIRIN 81 MG/1
81 TABLET, CHEWABLE ORAL DAILY
Status: DISCONTINUED | OUTPATIENT
Start: 2017-10-30 | End: 2017-11-01 | Stop reason: HOSPADM

## 2017-10-30 RX ORDER — SUCRALFATE ORAL 1 G/10ML
1 SUSPENSION ORAL 4 TIMES DAILY
Status: DISCONTINUED | OUTPATIENT
Start: 2017-10-30 | End: 2017-10-30

## 2017-10-30 RX ORDER — LEVOTHYROXINE SODIUM 0.1 MG/1
100 TABLET ORAL DAILY
Status: DISCONTINUED | OUTPATIENT
Start: 2017-10-30 | End: 2017-11-01 | Stop reason: HOSPADM

## 2017-10-30 RX ORDER — MORPHINE SULFATE 2 MG/ML
2 INJECTION, SOLUTION INTRAMUSCULAR; INTRAVENOUS EVERY 4 HOURS PRN
Status: DISCONTINUED | OUTPATIENT
Start: 2017-10-30 | End: 2017-11-01 | Stop reason: HOSPADM

## 2017-10-30 RX ORDER — CHOLECALCIFEROL (VITAMIN D3) 125 MCG
1000 CAPSULE ORAL DAILY
Status: DISCONTINUED | OUTPATIENT
Start: 2017-10-30 | End: 2017-11-01 | Stop reason: HOSPADM

## 2017-10-30 RX ORDER — ACETAMINOPHEN 325 MG/1
650 TABLET ORAL EVERY 4 HOURS PRN
Status: DISCONTINUED | OUTPATIENT
Start: 2017-10-30 | End: 2017-11-01 | Stop reason: HOSPADM

## 2017-10-30 RX ORDER — RANOLAZINE 500 MG/1
500 TABLET, EXTENDED RELEASE ORAL 2 TIMES DAILY
Status: DISCONTINUED | OUTPATIENT
Start: 2017-10-30 | End: 2017-11-01 | Stop reason: HOSPADM

## 2017-10-30 RX ORDER — NIACIN 500 MG/1
1000 TABLET, EXTENDED RELEASE ORAL NIGHTLY
Status: DISCONTINUED | OUTPATIENT
Start: 2017-10-30 | End: 2017-11-01 | Stop reason: HOSPADM

## 2017-10-30 RX ORDER — AMLODIPINE BESYLATE 5 MG/1
5 TABLET ORAL DAILY
Status: DISCONTINUED | OUTPATIENT
Start: 2017-10-30 | End: 2017-10-30

## 2017-10-30 RX ORDER — HYDRALAZINE HYDROCHLORIDE 20 MG/ML
10 INJECTION INTRAMUSCULAR; INTRAVENOUS EVERY 10 MIN PRN
Status: DISCONTINUED | OUTPATIENT
Start: 2017-10-30 | End: 2017-11-01 | Stop reason: HOSPADM

## 2017-10-30 RX ORDER — PANTOPRAZOLE SODIUM 40 MG/1
40 TABLET, DELAYED RELEASE ORAL
Status: DISCONTINUED | OUTPATIENT
Start: 2017-10-30 | End: 2017-11-01 | Stop reason: HOSPADM

## 2017-10-30 RX ORDER — MORPHINE SULFATE 4 MG/ML
4 INJECTION, SOLUTION INTRAMUSCULAR; INTRAVENOUS ONCE
Status: COMPLETED | OUTPATIENT
Start: 2017-10-30 | End: 2017-10-30

## 2017-10-30 RX ORDER — AMLODIPINE BESYLATE 10 MG/1
10 TABLET ORAL DAILY
Status: DISCONTINUED | OUTPATIENT
Start: 2017-10-31 | End: 2017-11-01 | Stop reason: HOSPADM

## 2017-10-30 RX ORDER — ONDANSETRON 2 MG/ML
4 INJECTION INTRAMUSCULAR; INTRAVENOUS ONCE
Status: COMPLETED | OUTPATIENT
Start: 2017-10-30 | End: 2017-10-30

## 2017-10-30 RX ORDER — HEPARIN SODIUM 5000 [USP'U]/ML
INJECTION, SOLUTION INTRAVENOUS; SUBCUTANEOUS DAILY PRN
Status: COMPLETED | OUTPATIENT
Start: 2017-10-30 | End: 2017-10-30

## 2017-10-30 RX ORDER — ATORVASTATIN CALCIUM 40 MG/1
40 TABLET, FILM COATED ORAL NIGHTLY
Status: DISCONTINUED | OUTPATIENT
Start: 2017-10-30 | End: 2017-11-01 | Stop reason: HOSPADM

## 2017-10-30 RX ORDER — SODIUM CHLORIDE 9 MG/ML
INJECTION, SOLUTION INTRAVENOUS CONTINUOUS
Status: DISPENSED | OUTPATIENT
Start: 2017-10-30 | End: 2017-10-30

## 2017-10-30 RX ADMIN — TICAGRELOR 90 MG: 90 TABLET ORAL at 21:28

## 2017-10-30 RX ADMIN — CARVEDILOL 3.12 MG: 3.12 TABLET, FILM COATED ORAL at 08:31

## 2017-10-30 RX ADMIN — RANOLAZINE 500 MG: 500 TABLET, FILM COATED, EXTENDED RELEASE ORAL at 21:28

## 2017-10-30 RX ADMIN — NITROGLYCERIN 0.5 INCH: 20 OINTMENT TOPICAL at 14:18

## 2017-10-30 RX ADMIN — TICAGRELOR 180 MG: 90 TABLET ORAL at 06:14

## 2017-10-30 RX ADMIN — SODIUM CHLORIDE: 9 INJECTION, SOLUTION INTRAVENOUS at 08:33

## 2017-10-30 RX ADMIN — HEPARIN SODIUM 4000 UNITS: 5000 INJECTION, SOLUTION INTRAVENOUS; SUBCUTANEOUS at 06:11

## 2017-10-30 RX ADMIN — RANOLAZINE 500 MG: 500 TABLET, FILM COATED, EXTENDED RELEASE ORAL at 08:33

## 2017-10-30 RX ADMIN — TICAGRELOR 180 MG: 90 TABLET ORAL at 06:12

## 2017-10-30 RX ADMIN — ONDANSETRON 4 MG: 2 INJECTION INTRAMUSCULAR; INTRAVENOUS at 06:05

## 2017-10-30 RX ADMIN — SUCRALFATE 1 G: 1 SUSPENSION ORAL at 08:32

## 2017-10-30 RX ADMIN — Medication 4 MG: at 06:07

## 2017-10-30 RX ADMIN — CYANOCOBALAMIN TAB 500 MCG 1000 MCG: 500 TAB at 08:32

## 2017-10-30 RX ADMIN — PANTOPRAZOLE SODIUM 40 MG: 40 TABLET, DELAYED RELEASE ORAL at 08:32

## 2017-10-30 RX ADMIN — CARVEDILOL 3.12 MG: 3.12 TABLET, FILM COATED ORAL at 21:28

## 2017-10-30 RX ADMIN — AMLODIPINE BESYLATE 5 MG: 5 TABLET ORAL at 08:32

## 2017-10-30 RX ADMIN — LEVOTHYROXINE SODIUM 100 MCG: 100 TABLET ORAL at 08:32

## 2017-10-30 RX ADMIN — ATORVASTATIN CALCIUM 40 MG: 40 TABLET, FILM COATED ORAL at 21:28

## 2017-10-30 RX ADMIN — ONDANSETRON 4 MG: 2 INJECTION INTRAMUSCULAR; INTRAVENOUS at 15:58

## 2017-10-30 RX ADMIN — Medication 2 MG: at 13:48

## 2017-10-30 ASSESSMENT — PAIN SCALES - GENERAL
PAINLEVEL_OUTOF10: 2
PAINLEVEL_OUTOF10: 9
PAINLEVEL_OUTOF10: 5
PAINLEVEL_OUTOF10: 0
PAINLEVEL_OUTOF10: 0
PAINLEVEL_OUTOF10: 9
PAINLEVEL_OUTOF10: 9
PAINLEVEL_OUTOF10: 2

## 2017-10-30 ASSESSMENT — ENCOUNTER SYMPTOMS
GASTROINTESTINAL NEGATIVE: 1
VOMITING: 0
WHEEZING: 0
EYES NEGATIVE: 1
ABDOMINAL DISTENTION: 0
HEMOPTYSIS: 0
NAUSEA: 0
CHEST TIGHTNESS: 1
ABDOMINAL PAIN: 0
SORE THROAT: 0
SHORTNESS OF BREATH: 1
ORTHOPNEA: 0
PHOTOPHOBIA: 0
COUGH: 1
COUGH: 0
EYE DISCHARGE: 0

## 2017-10-30 ASSESSMENT — PAIN DESCRIPTION - PROGRESSION
CLINICAL_PROGRESSION: RAPIDLY WORSENING
CLINICAL_PROGRESSION: GRADUALLY IMPROVING

## 2017-10-30 ASSESSMENT — PAIN DESCRIPTION - PAIN TYPE
TYPE: ACUTE PAIN

## 2017-10-30 ASSESSMENT — PAIN DESCRIPTION - DESCRIPTORS
DESCRIPTORS: SHARP
DESCRIPTORS: CRUSHING

## 2017-10-30 ASSESSMENT — PAIN DESCRIPTION - LOCATION
LOCATION: CHEST

## 2017-10-30 ASSESSMENT — PAIN DESCRIPTION - ORIENTATION
ORIENTATION: ANTERIOR
ORIENTATION: ANTERIOR

## 2017-10-30 ASSESSMENT — PAIN DESCRIPTION - FREQUENCY
FREQUENCY: CONTINUOUS
FREQUENCY: CONTINUOUS

## 2017-10-30 NOTE — PROGRESS NOTES
Patient reports feeling lightheaded upon elevating HOB. Given juice, crackers, and peanut butter, and lunch about to be delivered. Reminded not to try to get out of bed without calling for assistance first.  Heart rhythm remains NSR. BP slightly elevated, below PRN BP med parameters. Seen by James Alexander and Dr. Mariola Carpenter and orders received. 12-lead EKG completed at bedside.

## 2017-10-30 NOTE — CONSULTS
Inpatient consult to GI  Consult performed by: Willam Benoit  Consult ordered by: Myesha Rooney        Patient Name: Génesis Washington Date: 10/30/2017  5:58 AM  MR #: [id number]  : 1950    Attending Physician: Barbara Hernandez MD  Reason for consult: Concern for gastroesophageal reflux disease    History of Presenting Illness:      Jessy Red is a 69 y.o. male on hospital day 0 with a history of chest pain. History Obtained From:  patient  Patient with known cardiac history admitted over the weekend with chest pain. Patient underwent a emergent cardiac catheterization with drug-eluting stent to the right coronary artery. Prior to the presentation on this occasion patient had presented to the ER a couple of days ago with chest discomfort and had a negative EKG and was suggested that he might have reflux as the cause for his symptoms, patient was started on PPI and Carafate with no response to the symptoms. Patient also has had symptoms of cough and postnasal drip and sinus issues in the past.  Patient denies any heartburn, dysphagia, hematemesis or weight loss    History:      Past Medical History:   Diagnosis Date    CAD (coronary artery disease)     Congenital heart disease     Hyperlipidemia     Hypertension     Hypothyroidism     Knee pain     MI (myocardial infarction)      Past Surgical History:   Procedure Laterality Date    CARDIAC CATHETERIZATION  16    DR Meghna Mccallum    CORONARY ANGIOPLASTY WITH STENT PLACEMENT      VASECTOMY       Family History  History reviewed. No pertinent family history. [] Unable to obtain due to ventilated and/ or neurologic status    Social History     Social History    Marital status:      Spouse name: N/A    Number of children: N/A    Years of education: N/A     Occupational History    Not on file.      Social History Main Topics    Smoking status: Former Smoker     Packs/day: 1.50     Years: 20.00     Types: Cigarettes    Smokeless tobacco: Never Used    Alcohol use 1.2 oz/week     1 Glasses of wine, 1 Cans of beer per week      Comment: rarely     Drug use: No    Sexual activity: Not on file     Other Topics Concern    Not on file     Social History Narrative    No narrative on file      [] Unable to obtain due to ventilated and/ or neurologic status    Home Medications:      Prescriptions Prior to Admission: sucralfate (CARAFATE) 1 GM/10ML suspension, Take 10 mLs by mouth 4 times daily  omeprazole (PRILOSEC) 20 MG delayed release capsule, Take 1 capsule by mouth daily  levothyroxine (SYNTHROID) 100 MCG tablet, Take 1 tablet by mouth daily  carvedilol (COREG) 3.125 MG tablet, 1 po bid  RANEXA 500 MG extended release tablet, 1 po bid  amLODIPine (NORVASC) 5 MG tablet, 1 po qd  PRALUENT 75 MG/ML SOPN injection pen, Inject 75 mg once every other week  Ascorbic Acid (VITAMIN C) 500 MG tablet, Take 500 mg by mouth daily  Multiple Vitamins-Minerals (CENTRUM SILVER ULTRA MENS PO), Take  by mouth. aspirin 81 MG tablet, Take 81 mg by mouth daily. vitamin B-12 (CYANOCOBALAMIN) 1000 MCG tablet, Take 1,000 mcg by mouth daily. Current Hospital Medications:     Scheduled Meds:   aspirin  81 mg Oral Daily    carvedilol  3.125 mg Oral BID    levothyroxine  100 mcg Oral Daily    niacin  1,000 mg Oral Nightly    pantoprazole  40 mg Oral QAM AC    ranolazine  500 mg Oral BID    sucralfate  1 g Oral 4x Daily    vitamin B-12  1,000 mcg Oral Daily    atorvastatin  40 mg Oral Nightly    ticagrelor  90 mg Oral BID    [START ON 10/31/2017] amLODIPine  10 mg Oral Daily     Continuous Infusions:   sodium chloride 75 mL/hr at 10/30/17 0833     PRN Meds:.acetaminophen, ondansetron, hydrALAZINE, labetalol  .  sodium chloride 75 mL/hr at 10/30/17 3787        Allergies:      Allergies   Allergen Reactions    Celebrex [Celecoxib]     Crestor [Rosuvastatin]     Simvastatin         Review of Systems:       [x] CV, Resp, Neuro, , and all other systems reviewed and negative other than listed in HPI.       Objective Findings:     Vitals:   Vitals:    10/30/17 0845 10/30/17 0900 10/30/17 0930 10/30/17 1000   BP: (!) 163/68 (!) 168/68 (!) 147/55 (!) 149/56   Pulse: 73 68 70 63   Resp: 15 19 14 13   Temp:       TempSrc:       SpO2: 100% 99% 98% 99%   Weight:       Height:          Physical Examination:  General: Well built and nourishe, alert and oriented, no apparent distress  HEENT: Normocephalic, No scleral icterus. Neck: No JVD. Heart: Regular, no murmur, no rub/gallop. No RV heave. Lungs: Clear to ascultation, no rales/wheezing/rhonchi. Good chest wall excursion. Abdomen: Distension none, soft, tenderness none, Bowel sounds normal  Extremities: No clubbing/cyanosis, no edema. Skin: Warm, dry, normal turgor, no rash, no bruise, no petichiae. Neuro: No myoclonus or tremor.    Psych: Normal affect    Results/ Medications reviewed 10/30/2017, 11:08 AM     Laboratory, Microbiology, Pathology, Radiology, Cardiology, Medications and Transcriptions reviewed  Scheduled Meds:   aspirin  81 mg Oral Daily    carvedilol  3.125 mg Oral BID    levothyroxine  100 mcg Oral Daily    niacin  1,000 mg Oral Nightly    pantoprazole  40 mg Oral QAM AC    ranolazine  500 mg Oral BID    sucralfate  1 g Oral 4x Daily    vitamin B-12  1,000 mcg Oral Daily    atorvastatin  40 mg Oral Nightly    ticagrelor  90 mg Oral BID    [START ON 10/31/2017] amLODIPine  10 mg Oral Daily     Continuous Infusions:   sodium chloride 75 mL/hr at 10/30/17 0833       Recent Labs      10/28/17   2215  10/30/17   0612   WBC  9.0  13.3*   HGB  13.5*  13.9*   HCT  39.3*  41.5*   MCV  87.1  89.4   PLT  251  228     Recent Labs      10/28/17   2215  10/30/17   0610  10/30/17   0633   NA  136   --   137   K  4.3   --   4.6   CL  98   --   100   CO2  23   --   20*   BUN  17   --   18   CREATININE  1.80*  1.1  1.15     Recent Labs      10/28/17   2215  10/30/17   0633   AST  23  21

## 2017-10-30 NOTE — ED PROVIDER NOTES
narrative on file       SCREENINGS             PHYSICAL EXAM    (up to 7 for level 4, 8 or more for level 5)     ED Triage Vitals   BP Temp Temp src Pulse Resp SpO2 Height Weight   10/30/17 0602 -- -- 10/30/17 0602 10/30/17 0602 10/30/17 0603 10/30/17 0602 10/30/17 0602   (!) 178/147   65 18 100 % 6' 4\" (1.93 m) 225 lb (102.1 kg)       Physical Exam   Constitutional: He is oriented to person, place, and time. He appears well-developed. He appears distressed. Patient somewhat pale in color. HENT:   Head: Normocephalic. Nose: Nose normal.   Eyes: Conjunctivae are normal. Pupils are equal, round, and reactive to light. Neck: Normal range of motion. Neck supple. Cardiovascular: Normal rate, regular rhythm, normal heart sounds and intact distal pulses. Exam reveals no friction rub. No murmur heard. Pulmonary/Chest: Effort normal and breath sounds normal.   Abdominal: Soft. Bowel sounds are normal. There is no tenderness. There is no guarding. Musculoskeletal: Normal range of motion. Neurological: He is alert and oriented to person, place, and time. Skin: Skin is warm. He is diaphoretic. Psychiatric: He has a normal mood and affect. Nursing note and vitals reviewed. DIAGNOSTIC RESULTS     EKG: All EKG's are interpreted by the Emergency Department Physician who either signs or Co-signs this chart in the absence of a cardiologist.    Initial EKG shows acute ST elevation in the inferior leads. This is consistent with an acute MI. Patient has 2-3 mm of elevation in leads 2, 3, and aVF. T-wave inversion in lead aVL. It is a sinus rhythm overall. Leftward axis. Abnormal EKG. RADIOLOGY:   Non-plain film images such as CT, Ultrasound and MRI are read by the radiologist. Plain radiographic images are visualized and preliminarily interpreted by the emergency physician with the below findings:    Portal chest x-ray shows normal heart silhouette.   Minimal congestion    Interpretation per the Radiologist below, if available at the time of this note:    XR Chest Portable    (Results Pending)         ED BEDSIDE ULTRASOUND:   Performed by ED Physician - none    LABS:  Labs Reviewed   COMPREHENSIVE METABOLIC PANEL   CBC WITH AUTO DIFFERENTIAL   TROPONIN   PROTIME-INR   POCT VENOUS   POCT GLUCOSE       All other labs were within normal range or not returned as of this dictation. EMERGENCY DEPARTMENT COURSE and DIFFERENTIAL DIAGNOSIS/MDM:   Vitals:    Vitals:    10/30/17 0602 10/30/17 0603   BP: (!) 178/147    Pulse: 65    Resp: 18    SpO2:  100%   Weight: 225 lb (102.1 kg)    Height: 6' 4\" (1.93 m)          ED Course      MDM patient's initial presentation EKG suggestive of acute STEMI. Clarice Torresmehdi was called at 06 100. I spoke to interventional cardiologist Dr. Claudell Artist whose coming in to perform her catheterization assessment. Care is given the patient to improve his pain control and monitor vital signs and stabilize him for  catheterization    CRITICAL CARE TIME   Total Critical Care time was 33 minutes, excluding separately reportable procedures. There was a high probability of clinically significant/life threatening deterioration in the patient's condition which required my urgent intervention. CONSULTS:  None    PROCEDURES:  Unless otherwise noted below, none     Procedures    FINAL IMPRESSION    No diagnosis found. DISPOSITION/PLAN   DISPOSITION     PATIENT REFERRED TO:  No follow-up provider specified.     DISCHARGE MEDICATIONS:  New Prescriptions    No medications on file          (Please note that portions of this note were completed with a voice recognition program.  Efforts were made to edit the dictations but occasionally words are mis-transcribed.)    Lorena Hayes MD (electronically signed)  Attending Emergency Physician          Lorena Hayes MD  10/30/17 7932

## 2017-10-30 NOTE — ED NOTES
Dr. Orion Ashton returned call to [address] [place] [address], [address] [place] [address]  10/30/17 0179

## 2017-10-30 NOTE — H&P
Chief Complaint   Patient presents with    Chest Pain     started 30 minutes ago, cp, sob        Patient is a 69 y.o. male who presents with a chief complaint of CP. Patient is followed on a regular basis by Dr. Patricia Gomez MD. Patient with history of coronary artery disease status post PCI to the RCA, hypertension hyperlipidemia. He presented with midsternal chest pressure and heaviness and was noted to have ST elevation MI on presentation to emergency department. Cold purple was activated. Apparently patient was here a few days ago in the ER and was diagnosed with an upper rest with infection/bronchitis. He states his been compliant with his medications. He does follow-up with Dr. Marysol Meza for his cardiology care. He did undergo a cardiac catheterization in May 2016 that showed 30% in-stent restenosis  of mid RCA, 2030% diagonal 1 branch, aneurysmal left main, normal LV function. Pt denies   nausea, vomiting, diarrhea, constipation, motor weakness, insomnia, weight loss, syncope, dizziness, lightheadedness, palpitations, PND, orthopnea, or claudication. No bleeding issues.          Patient Active Problem List   Diagnosis    Congenital heart disease    Knee pain    CAD (coronary artery disease)    ST elevation myocardial infarction involving left anterior descending (LAD) coronary artery [facility])       Past Surgical History:   Procedure Laterality Date    CARDIAC CATHETERIZATION  05/13/16    DR Pepe Lama    CORONARY ANGIOPLASTY WITH STENT PLACEMENT  2008    VASECTOMY         Social History     Social History    Marital status:      Spouse name: N/A    Number of children: N/A    Years of education: N/A     Social History Main Topics    Smoking status: Former Smoker     Packs/day: 1.50     Years: 20.00     Types: Cigarettes    Smokeless tobacco: Never Used    Alcohol use 1.2 oz/week     1 Glasses of wine, 1 Cans of beer per week      Comment: rarely     Drug use: No    Sexual activity: Not Asked

## 2017-10-30 NOTE — PROGRESS NOTES
Cardiology Progress Note      Patient:  Jessy Red  YOB: 1950  MRN: [id number]   Acct: [de-identified]  Admit Date:  10/30/2017      SHARED VISIT CNP: Shahla Daniels NP  PRIMARY CARE PHYSICIAN: Ruben Traylor MD  CARDIOLOGIST:Dr. Barbara Hernandez        Impression/Plan:  1. CAD s/p inferior STEMI with emergent PCI and ANGEL to RCA. Mild residual angina. Will obtain post procedure EKG, cycle troponin. BMP and EKG in am. Check echo. Increase amlodipine. Continue AS, brilinta, statin, carvedilol. 2. GERD, prior to admit reports severe symptoms with associated cough, refractory to protonix, carafate, referred to GI as outpatient to evaluate for possible esophagitis, will obtain GI consult. Chief Complaint/Active Symptoms: Mild residual chest \"ache\", persistent cough    Objective:   BP (!) 163/68   Pulse 73   Temp 97.7 °F (36.5 °C) (Oral)   Resp 15   Ht 6' 4\" (1.93 m)   Wt 225 lb (102.1 kg)   SpO2 100%   BMI 27.39 kg/m²    Wt Readings from Last 3 Encounters:   10/30/17 225 lb (102.1 kg)   10/28/17 225 lb (102.1 kg)   10/17/17 228 lb (103.4 kg)       TELEMETRY: normal sinus                             Rhythm Strip: NSR      Physical Exam:  Physical Exam   Constitutional: He is oriented to person, place, and time. He appears well-developed and well-nourished. No distress. HENT:   Head: Normocephalic. Eyes: Pupils are equal, round, and reactive to light. Neck: Normal range of motion. No JVD present. Cardiovascular: Normal rate, regular rhythm and normal heart sounds. Pulmonary/Chest: Effort normal and breath sounds normal. No respiratory distress. Abdominal: Soft. Bowel sounds are normal. He exhibits no distension. Musculoskeletal: Normal range of motion. He exhibits no edema or tenderness. Neurological: He is alert and oriented to person, place, and time. Skin: Skin is warm and dry. Psychiatric: He has a normal mood and affect.         Medications:    amLODIPine  5 mg Oral Daily 10/30/2017    HCT 41.5 10/30/2017     10/30/2017       BMP: @(bmp:3)@    CMP:  Lab Results   Component Value Date     10/30/2017    K 4.6 10/30/2017     10/30/2017    CO2 20 10/30/2017    BUN 18 10/30/2017    CREATININE 1.15 10/30/2017    GFRAA >60.0 10/30/2017    LABGLOM >60.0 10/30/2017    GLUCOSE 151 10/30/2017    CALCIUM 9.6 10/30/2017       Hepatic Function Panel:  Lab Results   Component Value Date    ALKPHOS 69 10/30/2017    ALT 31 10/30/2017    AST 21 10/30/2017    PROT 7.2 10/30/2017    BILITOT 0.7 10/30/2017    LABALBU 4.3 10/30/2017       Magnesium:    Lab Results   Component Value Date    MG 2.2 10/28/2017       PT/INR:    Lab Results   Component Value Date    PROTIME 10.5 10/30/2017    INR 1.0 10/30/2017          Electronically signed by Sarah Zacarias NP on 10/30/2017 at 8:58 AM

## 2017-10-30 NOTE — PROGRESS NOTES
Patient reports marked decrease in nausea, able to take in po fluids. Reports continued pressure to mid-sternal region described as something being stuck. Maintaining pulsox high 90's on room air.   NSR.

## 2017-10-30 NOTE — ED NOTES
Report being given to cath lab- [facility].  Gonzalo Apodaca RN  10/30/17 [id number]

## 2017-10-31 LAB
ANION GAP SERPL CALCULATED.3IONS-SCNC: 13 MEQ/L (ref 7–13)
BUN BLDV-MCNC: 10 MG/DL (ref 8–23)
CALCIUM SERPL-MCNC: 8.8 MG/DL (ref 8.6–10.2)
CHLORIDE BLD-SCNC: 98 MEQ/L (ref 98–107)
CO2: 24 MEQ/L (ref 22–29)
CREAT SERPL-MCNC: 0.86 MG/DL (ref 0.7–1.2)
EKG ATRIAL RATE: 61 BPM
EKG ATRIAL RATE: 62 BPM
EKG ATRIAL RATE: 67 BPM
EKG ATRIAL RATE: 67 BPM
EKG P AXIS: 1 DEGREES
EKG P AXIS: 31 DEGREES
EKG P AXIS: 36 DEGREES
EKG P AXIS: 60 DEGREES
EKG P-R INTERVAL: 178 MS
EKG P-R INTERVAL: 188 MS
EKG P-R INTERVAL: 190 MS
EKG P-R INTERVAL: 202 MS
EKG Q-T INTERVAL: 414 MS
EKG Q-T INTERVAL: 428 MS
EKG Q-T INTERVAL: 428 MS
EKG Q-T INTERVAL: 474 MS
EKG QRS DURATION: 86 MS
EKG QRS DURATION: 88 MS
EKG QRS DURATION: 90 MS
EKG QRS DURATION: 92 MS
EKG QTC CALCULATION (BAZETT): 434 MS
EKG QTC CALCULATION (BAZETT): 437 MS
EKG QTC CALCULATION (BAZETT): 452 MS
EKG QTC CALCULATION (BAZETT): 477 MS
EKG R AXIS: -10 DEGREES
EKG R AXIS: 3 DEGREES
EKG R AXIS: 72 DEGREES
EKG R AXIS: 9 DEGREES
EKG T AXIS: -2 DEGREES
EKG T AXIS: -54 DEGREES
EKG T AXIS: -64 DEGREES
EKG T AXIS: 98 DEGREES
EKG VENTRICULAR RATE: 61 BPM
EKG VENTRICULAR RATE: 62 BPM
EKG VENTRICULAR RATE: 67 BPM
EKG VENTRICULAR RATE: 67 BPM
GFR AFRICAN AMERICAN: >60
GFR NON-AFRICAN AMERICAN: >60
GLUCOSE BLD-MCNC: 101 MG/DL (ref 74–109)
HCT VFR BLD CALC: 36.8 % (ref 42–52)
HEMOGLOBIN: 12.3 G/DL (ref 14–18)
MCH RBC QN AUTO: 30.2 PG (ref 27–31.3)
MCHC RBC AUTO-ENTMCNC: 33.4 % (ref 33–37)
MCV RBC AUTO: 90.4 FL (ref 80–100)
PDW BLD-RTO: 12.8 % (ref 11.5–14.5)
PLATELET # BLD: 226 K/UL (ref 130–400)
POTASSIUM SERPL-SCNC: 3.7 MEQ/L (ref 3.5–5.1)
RBC # BLD: 4.07 M/UL (ref 4.7–6.1)
SODIUM BLD-SCNC: 135 MEQ/L (ref 132–144)
WBC # BLD: 11.8 K/UL (ref 4.8–10.8)

## 2017-10-31 PROCEDURE — 2060000000 HC ICU INTERMEDIATE R&B

## 2017-10-31 PROCEDURE — 6370000000 HC RX 637 (ALT 250 FOR IP): Performed by: INTERNAL MEDICINE

## 2017-10-31 PROCEDURE — 80048 BASIC METABOLIC PNL TOTAL CA: CPT

## 2017-10-31 PROCEDURE — 36415 COLL VENOUS BLD VENIPUNCTURE: CPT

## 2017-10-31 PROCEDURE — 85027 COMPLETE CBC AUTOMATED: CPT

## 2017-10-31 PROCEDURE — 6370000000 HC RX 637 (ALT 250 FOR IP): Performed by: NURSE PRACTITIONER

## 2017-10-31 RX ORDER — CETIRIZINE HYDROCHLORIDE 10 MG/1
10 TABLET ORAL DAILY
Status: DISCONTINUED | OUTPATIENT
Start: 2017-10-31 | End: 2017-11-01 | Stop reason: HOSPADM

## 2017-10-31 RX ADMIN — TICAGRELOR 90 MG: 90 TABLET ORAL at 20:24

## 2017-10-31 RX ADMIN — PANTOPRAZOLE SODIUM 40 MG: 40 TABLET, DELAYED RELEASE ORAL at 07:56

## 2017-10-31 RX ADMIN — CYANOCOBALAMIN TAB 500 MCG 1000 MCG: 500 TAB at 07:56

## 2017-10-31 RX ADMIN — PANTOPRAZOLE SODIUM 40 MG: 40 TABLET, DELAYED RELEASE ORAL at 05:57

## 2017-10-31 RX ADMIN — CARVEDILOL 3.12 MG: 3.12 TABLET, FILM COATED ORAL at 20:24

## 2017-10-31 RX ADMIN — CARVEDILOL 3.12 MG: 3.12 TABLET, FILM COATED ORAL at 07:56

## 2017-10-31 RX ADMIN — RANOLAZINE 500 MG: 500 TABLET, FILM COATED, EXTENDED RELEASE ORAL at 07:55

## 2017-10-31 RX ADMIN — RANOLAZINE 500 MG: 500 TABLET, FILM COATED, EXTENDED RELEASE ORAL at 20:24

## 2017-10-31 RX ADMIN — AMLODIPINE BESYLATE 10 MG: 10 TABLET ORAL at 07:56

## 2017-10-31 RX ADMIN — TICAGRELOR 90 MG: 90 TABLET ORAL at 07:56

## 2017-10-31 RX ADMIN — ASPIRIN 81 MG 81 MG: 81 TABLET ORAL at 07:56

## 2017-10-31 ASSESSMENT — PAIN SCALES - GENERAL
PAINLEVEL_OUTOF10: 0

## 2017-10-31 ASSESSMENT — PAIN DESCRIPTION - PROGRESSION
CLINICAL_PROGRESSION: GRADUALLY IMPROVING

## 2017-10-31 NOTE — CARE COORDINATION
In ICU rounds Pt stated he is feeling a little bit better. Plan is home when stable. Wife had a lot of questions for Dr Lauren Linares. Ayanna Del Rosario Electronically signed by MARISEL Hicks on 10/31/2017 at 12:02 PM

## 2017-10-31 NOTE — PROGRESS NOTES
Cardiology Progress Note      Patient:  Rajinder Lombardo  YOB: 1950  MRN: [id number]   Acct: [de-identified]  Admit Date:  10/30/2017      SHARED VISIT CNP: Twila Reed NP  PRIMARY CARE PHYSICIAN: Jasper Essex, MD  CARDIOLOGIST:Dr. Geiger Skill        Impression/Plan:  1. CAD s/p inferior STEMI with emergent PCI and ANGEL to RCA. Had considerable residual chest discomfort post PCI, this has subsequently resolved. Troponin peak at 2.5. Renal function stable post procedure. EKG showing anticipated evolutionary changes inferior leads. Will continue ASA, statin, brilinta, carvedilol, nitrate and ranexa. Echocardiogram ordered and is pending. Ok to transfer to telemetry, possible discharge in am if stable. 2. Persistent cough, evaluated by GI for potential GERD exacerbation, recommendations to continue present PPI therapy with outpatient follow up. Consider possible allergic rhinitis, will add cetirizine. Needs to follow up with PCP, possible ENT referral as outpatient. Chief Complaint/Active Symptoms: Residual chest pain significantly improved, persistent dry cough, no further nausea or vomiting    Objective:   BP (!) 132/53   Pulse 71   Temp 98.7 °F (37.1 °C) (Temporal)   Resp 19   Ht 6' 4\" (1.93 m)   Wt 222 lb 7.1 oz (100.9 kg)   SpO2 91%   BMI 27.08 kg/m²    Wt Readings from Last 3 Encounters:   10/31/17 222 lb 7.1 oz (100.9 kg)   10/28/17 225 lb (102.1 kg)   10/17/17 228 lb (103.4 kg)       TELEMETRY: normal sinus                             Rhythm Strip: NSR      Physical Exam:  Physical Exam   Constitutional: He is oriented to person, place, and time. He appears well-developed and well-nourished. No distress. HENT:   Head: Normocephalic. Eyes: Pupils are equal, round, and reactive to light. Neck: Normal range of motion. No JVD present. Cardiovascular: Normal rate, regular rhythm and intact distal pulses.     RFA access site no hematoma or bruit   Pulmonary/Chest: Effort normal and breath sounds normal. No respiratory distress. Abdominal: Soft. Bowel sounds are normal. He exhibits no distension. Musculoskeletal: Normal range of motion. He exhibits no edema. Neurological: He is alert and oriented to person, place, and time. Skin: Skin is warm and dry. Psychiatric: He has a normal mood and affect. Medications:    [START ON 11/1/2017] influenza virus vaccine  0.5 mL Intramuscular Once    aspirin  81 mg Oral Daily    carvedilol  3.125 mg Oral BID    levothyroxine  100 mcg Oral Daily    niacin  1,000 mg Oral Nightly    pantoprazole  40 mg Oral QAM AC    ranolazine  500 mg Oral BID    vitamin B-12  1,000 mcg Oral Daily    atorvastatin  40 mg Oral Nightly    ticagrelor  90 mg Oral BID    amLODIPine  10 mg Oral Daily    nitroglycerin  0.5 inch Topical 4 times per day          acetaminophen 650 mg Q4H PRN   ondansetron 4 mg Q6H PRN   hydrALAZINE 10 mg Q10 Min PRN   labetalol 10 mg Q30 Min PRN   morphine 2 mg Q4H PRN       Diagnostics:      Xr Chest Portable    Result Date: 10/30/2017  XR CHEST PORTABLE: 10/30/2017 CLINICAL HISTORY: Chest pain. COMPARISON: 10/28/2017. A portable upright AP radiograph of the chest was obtained. FINDINGS: There is no pulmonary infiltrate, significant pleural effusion, vascular congestion, pneumothorax, or displaced fractures identified. The cardiac and mediastinal silhouettes appear within normal limits for technique.      NO EVIDENCE OF ACTIVE CARDIOPULMONARY DISEASE.         EKG:  NSR with inferior Q waves    Echo: pending      Lab Data:    Cardiac Enzymes:  Recent Labs      10/28/17   2215  10/29/17   0045  10/30/17   0612  10/30/17   1202   CKTOTAL  212*  200*   --   1,301*   CKMB  4.9  4.8   --   130.9*   CKMBINDEX  2.3  2.4   --   10.1*   TROPONINI  <0.010  <0.010  <0.010  2.510*     ProBNP: No results found for: PROBNP    CBC:   Lab Results   Component Value Date    WBC 11.8 10/31/2017    RBC 4.07 10/31/2017    HGB 12.3 10/31/2017    HCT 36.8 10/31/2017     10/31/2017       BMP: @(bmp:3)@    CMP:  Lab Results   Component Value Date     10/31/2017    K 3.7 10/31/2017    CL 98 10/31/2017    CO2 24 10/31/2017    BUN 10 10/31/2017    CREATININE 0.86 10/31/2017    GFRAA >60.0 10/31/2017    LABGLOM >60.0 10/31/2017    GLUCOSE 101 10/31/2017    CALCIUM 8.8 10/31/2017       Hepatic Function Panel:  Lab Results   Component Value Date    ALKPHOS 69 10/30/2017    ALT 31 10/30/2017    AST 21 10/30/2017    PROT 7.2 10/30/2017    BILITOT 0.7 10/30/2017    LABALBU 4.3 10/30/2017       Magnesium:    Lab Results   Component Value Date    MG 2.2 10/28/2017       PT/INR:    Lab Results   Component Value Date    PROTIME 10.5 10/30/2017    INR 1.0 10/30/2017          Electronically signed by Adolfo King NP on 10/31/2017 at 3:02 PM

## 2017-10-31 NOTE — PROGRESS NOTES
Patient seen on multidisciplinary rounds.   Issues discussed as RN is to f/u with primary MD for non-critical care issues    Toan Nugent  3:25 PM  10/31/17

## 2017-10-31 NOTE — DISCHARGE INSTR - OTHER ORDERS
r groin cath site:  Site may have some swelling no bigger than your thumb.  It may turn little purple;normal  Watch for increase in swelling like size of ping pong ball  Watch site for bleeding!!!!   IF YOU NOTICE BLEEDING HOLD PRESSURE AND CALL PHYSICIAN  AND COME TO ER

## 2017-11-01 VITALS
HEART RATE: 66 BPM | OXYGEN SATURATION: 97 % | WEIGHT: 219.36 LBS | DIASTOLIC BLOOD PRESSURE: 57 MMHG | TEMPERATURE: 98.5 F | BODY MASS INDEX: 26.71 KG/M2 | SYSTOLIC BLOOD PRESSURE: 126 MMHG | RESPIRATION RATE: 18 BRPM | HEIGHT: 76 IN

## 2017-11-01 PROCEDURE — 6370000000 HC RX 637 (ALT 250 FOR IP): Performed by: INTERNAL MEDICINE

## 2017-11-01 PROCEDURE — 93010 ELECTROCARDIOGRAM REPORT: CPT | Performed by: INTERNAL MEDICINE

## 2017-11-01 PROCEDURE — 6370000000 HC RX 637 (ALT 250 FOR IP): Performed by: NURSE PRACTITIONER

## 2017-11-01 PROCEDURE — 93005 ELECTROCARDIOGRAM TRACING: CPT

## 2017-11-01 RX ORDER — AMLODIPINE BESYLATE 10 MG/1
10 TABLET ORAL DAILY
Qty: 30 TABLET | Refills: 3 | Status: SHIPPED | OUTPATIENT
Start: 2017-11-02 | End: 2019-05-28

## 2017-11-01 RX ORDER — NITROGLYCERIN 0.4 MG/1
0.4 TABLET SUBLINGUAL EVERY 5 MIN PRN
Qty: 25 TABLET | Refills: 3 | Status: SHIPPED | OUTPATIENT
Start: 2017-11-01

## 2017-11-01 RX ADMIN — LEVOTHYROXINE SODIUM 100 MCG: 100 TABLET ORAL at 11:11

## 2017-11-01 RX ADMIN — CETIRIZINE HYDROCHLORIDE 10 MG: 10 TABLET, FILM COATED ORAL at 08:12

## 2017-11-01 RX ADMIN — TICAGRELOR 90 MG: 90 TABLET ORAL at 08:12

## 2017-11-01 RX ADMIN — PANTOPRAZOLE SODIUM 40 MG: 40 TABLET, DELAYED RELEASE ORAL at 05:53

## 2017-11-01 RX ADMIN — CARVEDILOL 3.12 MG: 3.12 TABLET, FILM COATED ORAL at 08:11

## 2017-11-01 RX ADMIN — AMLODIPINE BESYLATE 10 MG: 10 TABLET ORAL at 08:12

## 2017-11-01 RX ADMIN — CYANOCOBALAMIN TAB 500 MCG 1000 MCG: 500 TAB at 08:12

## 2017-11-01 RX ADMIN — RANOLAZINE 500 MG: 500 TABLET, FILM COATED, EXTENDED RELEASE ORAL at 08:11

## 2017-11-01 RX ADMIN — ASPIRIN 81 MG 81 MG: 81 TABLET ORAL at 08:12

## 2017-11-01 ASSESSMENT — PAIN SCALES - GENERAL
PAINLEVEL_OUTOF10: 0
PAINLEVEL_OUTOF10: 0

## 2017-11-01 NOTE — DISCHARGE SUMMARY
Cardiology Discharge Note        Patient:  Rodger Cadet  YOB: 1950  MRN: [id number]                     Acct: [de-identified]  Admit Date:  10/30/2017  Discharge Date: 11/1/17        SHARED VISIT CNP: Jennifer Velez MD  PRIMARY CARE PHYSICIAN: Tyrese Spain MD  CARDIOLOGIST:Dr. Grace Díaz           Impression/Plan:  1. CAD s/p inferior STEMI with emergent PCI and ANGEL to RCA. Had considerable residual chest discomfort post PCI, this has subsequently resolved. Troponin peak at 2.5. Renal function stable post procedure. EKG showing anticipated evolutionary changes inferior leads. Will continue ASA, statin, brilinta, carvedilol, nitrate and ranexa. Echo results noted below. He is intolerant to all statins. He is on Praluent. Amlodipine dose increased.      2. Persistent cough, evaluated by GI for potential GERD exacerbation, recommendations to continue present PPI therapy with outpatient follow up. Consider possible allergic rhinitis, will add cetirizine. Needs to follow up with PCP, possible ENT referral as outpatient.     Chief Complaint/Active Symptoms: Residual chest pain significantly improved, persistent dry cough, no further nausea or vomiting. Feels well. Wants to go home.        OK to discharge home. See orders. Followup on 11/7/17.   He is planning to spend the winter in [place], leaving in mid November.         Objective:   BP (!) 126/57   Pulse 66   Temp 98.5 °F (36.9 °C) (Oral)   Resp 18   Ht 6' 4\" (1.93 m)   Wt 219 lb 5.7 oz (99.5 kg)   SpO2 97%   BMI 26.70 kg/m²        Wt Readings from Last 3 Encounters:   11/01/17 219 lb 5.7 oz (99.5 kg)   10/28/17 225 lb (102.1 kg)   10/17/17 228 lb (103.4 kg)         TELEMETRY: normal sinus                             Rhythm Strip: NSR        ECHOCARDIOGRAM:   1. Normal LV systolic function.   2. LVEF 65%.   3. Normal chamber sizes.   4. Mild LV hypertrophy with diastolic dysfunction.   5. No significant valvular heart

## 2017-11-01 NOTE — PROGRESS NOTES
Cardiology Progress Note      Patient:  Clive Burton  YOB: 1950  MRN: [id number]   Acct: [de-identified]  Admit Date:  10/30/2017      SHARED VISIT CNP: Steve Gleason MD  PRIMARY CARE PHYSICIAN: Rosa Jones MD  CARDIOLOGIST:Dr. Kurtis Arredondo        Impression/Plan:  1. CAD s/p inferior STEMI with emergent PCI and ANGEL to RCA. Had considerable residual chest discomfort post PCI, this has subsequently resolved. Troponin peak at 2.5. Renal function stable post procedure. EKG showing anticipated evolutionary changes inferior leads. Will continue ASA, statin, brilinta, carvedilol, nitrate and ranexa. Echo results noted below. He is intolerant to all statins. He is on Praluent. Amlodipine dose increased. 2. Persistent cough, evaluated by GI for potential GERD exacerbation, recommendations to continue present PPI therapy with outpatient follow up. Consider possible allergic rhinitis, will add cetirizine. Needs to follow up with PCP, possible ENT referral as outpatient. Chief Complaint/Active Symptoms: Residual chest pain significantly improved, persistent dry cough, no further nausea or vomiting. Feels well. Wants to go home. OK to discharge home. See orders.        Objective:   BP (!) 126/57   Pulse 66   Temp 98.5 °F (36.9 °C) (Oral)   Resp 18   Ht 6' 4\" (1.93 m)   Wt 219 lb 5.7 oz (99.5 kg)   SpO2 97%   BMI 26.70 kg/m²    Wt Readings from Last 3 Encounters:   11/01/17 219 lb 5.7 oz (99.5 kg)   10/28/17 225 lb (102.1 kg)   10/17/17 228 lb (103.4 kg)       TELEMETRY: normal sinus                             Rhythm Strip: NSR      ECHOCARDIOGRAM:   1. Normal LV systolic function.   2. LVEF 65%.   3. Normal chamber sizes.   4. Mild LV hypertrophy with diastolic dysfunction.   5. No significant valvular heart disease.   6. Normal pericardium.   7. Mild PHTN.   Comments:   Clinical correlation is 15    hilary.   Coronary Tree   Dominance: Right   LVA Segment Contractility   1 - Normal    3 - Mild         5 - Severe       7 - Dyskinesis   hypokinesis      hypokinesis   2 -           4 - Moderate     6 - Akinesis     8 - Aneurysm   Hypokinesis   hypokinesis   VA  Ventriculography Findings  Normal LV systolic function. Physical Exam:  Physical Exam   Constitutional: He is oriented to person, place, and time. He appears well-developed and well-nourished. No distress. HENT:   Head: Normocephalic. Eyes: Pupils are equal, round, and reactive to light. Neck: Normal range of motion. No JVD present. Cardiovascular: Normal rate, regular rhythm and intact distal pulses. RFA access site no hematoma or bruit   Pulmonary/Chest: Effort normal and breath sounds normal. No respiratory distress. Abdominal: Soft. Bowel sounds are normal. He exhibits no distension. Musculoskeletal: Normal range of motion. He exhibits no edema. Neurological: He is alert and oriented to person, place, and time. Skin: Skin is warm and dry. Psychiatric: He has a normal mood and affect. Medications:    influenza virus vaccine  0.5 mL Intramuscular Once    cetirizine  10 mg Oral Daily    aspirin  81 mg Oral Daily    carvedilol  3.125 mg Oral BID    levothyroxine  100 mcg Oral Daily    niacin  1,000 mg Oral Nightly    pantoprazole  40 mg Oral QAM AC    ranolazine  500 mg Oral BID    vitamin B-12  1,000 mcg Oral Daily    atorvastatin  40 mg Oral Nightly    ticagrelor  90 mg Oral BID    amLODIPine  10 mg Oral Daily    nitroglycerin  0.5 inch Topical 4 times per day          acetaminophen 650 mg Q4H PRN   ondansetron 4 mg Q6H PRN   hydrALAZINE 10 mg Q10 Min PRN   labetalol 10 mg Q30 Min PRN   morphine 2 mg Q4H PRN       Diagnostics:      Xr Chest Portable    Result Date: 10/30/2017  XR CHEST PORTABLE: 10/30/2017 CLINICAL HISTORY: Chest pain. COMPARISON: 10/28/2017.  A portable upright AP radiograph of the chest was obtained. FINDINGS: There is no pulmonary infiltrate, significant pleural effusion, vascular congestion, pneumothorax, or displaced fractures identified. The cardiac and mediastinal silhouettes appear within normal limits for technique.      NO EVIDENCE OF ACTIVE CARDIOPULMONARY DISEASE.       EKG:  NSR with inferior Q waves    Echo: pending      Lab Data:    Cardiac Enzymes:  Recent Labs      10/30/17   0612  10/30/17   1202   CKTOTAL   --   1,301*   CKMB   --   130.9*   CKMBINDEX   --   10.1*   TROPONINI  <0.010  2.510*       CBC:   Lab Results   Component Value Date    WBC 11.8 10/31/2017    RBC 4.07 10/31/2017    HGB 12.3 10/31/2017    HCT 36.8 10/31/2017     10/31/2017       CMP:    Lab Results   Component Value Date     10/31/2017    K 3.7 10/31/2017    CL 98 10/31/2017    CO2 24 10/31/2017    BUN 10 10/31/2017    CREATININE 0.86 10/31/2017    GFRAA >60.0 10/31/2017    LABGLOM >60.0 10/31/2017    GLUCOSE 101 10/31/2017    CALCIUM 8.8 10/31/2017       Hepatic Function Panel:    Lab Results   Component Value Date    ALKPHOS 69 10/30/2017    ALT 31 10/30/2017    AST 21 10/30/2017    PROT 7.2 10/30/2017    BILITOT 0.7 10/30/2017    LABALBU 4.3 10/30/2017       Magnesium:    Lab Results   Component Value Date    MG 2.2 10/28/2017       PT/INR:    Lab Results   Component Value Date    PROTIME 10.5 10/30/2017    INR 1.0 10/30/2017          Electronically signed by Henry Beck MD on 11/1/2017 at 11:57 AM

## 2017-11-06 ENCOUNTER — HOSPITAL ENCOUNTER (OUTPATIENT)
Dept: CARDIAC REHAB | Age: 67
Setting detail: THERAPIES SERIES
Discharge: HOME | End: 2017-11-06
Payer: MEDICARE

## 2017-11-06 PROCEDURE — 93005 ELECTROCARDIOGRAM TRACING: CPT

## 2017-11-06 PROCEDURE — 93798 PHYS/QHP OP CAR RHAB W/ECG: CPT

## 2017-11-08 ENCOUNTER — HOSPITAL ENCOUNTER (OUTPATIENT)
Dept: CARDIAC REHAB | Age: 67
Setting detail: THERAPIES SERIES
Discharge: HOME | End: 2017-11-08
Payer: MEDICARE

## 2017-11-08 PROCEDURE — 93798 PHYS/QHP OP CAR RHAB W/ECG: CPT

## 2017-11-09 ENCOUNTER — HOSPITAL ENCOUNTER (OUTPATIENT)
Dept: ULTRASOUND IMAGING | Age: 67
Discharge: HOME | End: 2017-11-09
Payer: MEDICARE

## 2017-11-09 DIAGNOSIS — M79.89 SWELLING OF RIGHT UPPER EXTREMITY: ICD-10-CM

## 2017-11-09 PROCEDURE — 93971 EXTREMITY STUDY: CPT

## 2017-11-10 ENCOUNTER — HOSPITAL ENCOUNTER (OUTPATIENT)
Dept: CARDIAC REHAB | Age: 67
Setting detail: THERAPIES SERIES
Discharge: HOME | End: 2017-11-10
Payer: MEDICARE

## 2017-11-10 PROCEDURE — 93798 PHYS/QHP OP CAR RHAB W/ECG: CPT

## 2017-11-17 ENCOUNTER — APPOINTMENT (OUTPATIENT)
Dept: CARDIAC REHAB | Age: 67
End: 2017-11-17
Payer: MEDICARE

## 2017-11-20 ENCOUNTER — APPOINTMENT (OUTPATIENT)
Dept: CARDIAC REHAB | Age: 67
End: 2017-11-20
Payer: MEDICARE

## 2017-11-22 ENCOUNTER — APPOINTMENT (OUTPATIENT)
Dept: CARDIAC REHAB | Age: 67
End: 2017-11-22
Payer: MEDICARE

## 2017-11-24 ENCOUNTER — APPOINTMENT (OUTPATIENT)
Dept: CARDIAC REHAB | Age: 67
End: 2017-11-24
Payer: MEDICARE

## 2017-11-27 ENCOUNTER — APPOINTMENT (OUTPATIENT)
Dept: CARDIAC REHAB | Age: 67
End: 2017-11-27
Payer: MEDICARE

## 2017-11-29 ENCOUNTER — APPOINTMENT (OUTPATIENT)
Dept: CARDIAC REHAB | Age: 67
End: 2017-11-29
Payer: MEDICARE

## 2018-01-22 RX ORDER — LEVOTHYROXINE SODIUM 0.1 MG/1
TABLET ORAL
Qty: 90 TABLET | Refills: 0 | Status: SHIPPED | OUTPATIENT
Start: 2018-01-22 | End: 2018-04-20 | Stop reason: SDUPTHER

## 2018-08-15 ENCOUNTER — OFFICE VISIT (OUTPATIENT)
Dept: INTERNAL MEDICINE CLINIC | Age: 68
End: 2018-08-15
Payer: MEDICARE

## 2018-08-15 VITALS
OXYGEN SATURATION: 97 % | BODY MASS INDEX: 26.91 KG/M2 | WEIGHT: 221 LBS | HEIGHT: 76 IN | DIASTOLIC BLOOD PRESSURE: 66 MMHG | RESPIRATION RATE: 16 BRPM | SYSTOLIC BLOOD PRESSURE: 134 MMHG | TEMPERATURE: 97.8 F | HEART RATE: 51 BPM

## 2018-08-15 DIAGNOSIS — Z23 NEED FOR TDAP VACCINATION: ICD-10-CM

## 2018-08-15 DIAGNOSIS — Z12.5 PROSTATE CANCER SCREENING: ICD-10-CM

## 2018-08-15 DIAGNOSIS — Z13.6 SCREENING FOR AAA (ABDOMINAL AORTIC ANEURYSM): ICD-10-CM

## 2018-08-15 DIAGNOSIS — Z11.59 ENCOUNTER FOR HEPATITIS C SCREENING TEST FOR LOW RISK PATIENT: ICD-10-CM

## 2018-08-15 DIAGNOSIS — I25.10 CORONARY ARTERY DISEASE INVOLVING NATIVE HEART WITHOUT ANGINA PECTORIS, UNSPECIFIED VESSEL OR LESION TYPE: Primary | ICD-10-CM

## 2018-08-15 DIAGNOSIS — Z23 NEED FOR SHINGLES VACCINE: ICD-10-CM

## 2018-08-15 LAB — GLUCOSE FASTING: 101 MG/DL

## 2018-08-15 PROCEDURE — 90715 TDAP VACCINE 7 YRS/> IM: CPT | Performed by: FAMILY MEDICINE

## 2018-08-15 PROCEDURE — 99213 OFFICE O/P EST LOW 20 MIN: CPT | Performed by: FAMILY MEDICINE

## 2018-08-15 PROCEDURE — 90471 IMMUNIZATION ADMIN: CPT | Performed by: FAMILY MEDICINE

## 2018-08-15 ASSESSMENT — ENCOUNTER SYMPTOMS
RESPIRATORY NEGATIVE: 1
SHORTNESS OF BREATH: 0
GASTROINTESTINAL NEGATIVE: 1
EYES NEGATIVE: 1
ALLERGIC/IMMUNOLOGIC NEGATIVE: 1

## 2018-08-15 NOTE — PROGRESS NOTES
Allergies   Allergen Reactions    Lipitor [Atorvastatin] Other (See Comments)     myalgias    Celebrex [Celecoxib]     Crestor [Rosuvastatin]     Simvastatin      Health Maintenance   Topic Date Due    AAA screen  1950    Hepatitis C screen  1950    DTaP/Tdap/Td vaccine (1 - Tdap) 08/10/1967    Diabetes screen  08/10/1988    Shingles Vaccine (1 of 2 - 2 Dose Series) 12/02/2017    Flu vaccine (1) 09/01/2018    Lipid screen  05/04/2021    Colon cancer screen colonoscopy  02/18/2025    Pneumococcal low/med risk  Completed       Review of Systems    Review of Systems   Constitutional: Negative for activity change, appetite change, chills, fever and unexpected weight change. HENT: Negative. Eyes: Negative. Respiratory: Negative. Negative for shortness of breath. Cardiovascular: Negative. Negative for chest pain and palpitations. Gastrointestinal: Negative. Endocrine: Negative. Genitourinary: Negative. Musculoskeletal: Negative. Skin: Negative. Allergic/Immunologic: Negative. Neurological: Negative. Hematological: Negative. Psychiatric/Behavioral: Negative. Physical Exam  Vitals:    08/15/18 1529   BP: 134/66   Site: Right Arm   Position: Sitting   Cuff Size: Medium Adult   Pulse: 51   Resp: 16   Temp: 97.8 °F (36.6 °C)   TempSrc: Oral   SpO2: 97%   Weight: 221 lb (100.2 kg)   Height: 6' 4\" (1.93 m)       Physical Exam   Constitutional: He appears well-developed and well-nourished. HENT:   Right Ear: External ear normal.   Left Ear: External ear normal.   Eyes: Pupils are equal, round, and reactive to light. Conjunctivae are normal.   Neck: Normal range of motion. Neck supple. No thyromegaly present. Cardiovascular: Normal rate, regular rhythm and normal heart sounds. No murmur heard. Pulmonary/Chest: Effort normal and breath sounds normal.   Abdominal: Soft. Bowel sounds are normal.   Musculoskeletal: Normal range of motion.  He exhibits no edema or tenderness. Lymphadenopathy:     He has no cervical adenopathy. Assessment   Diagnosis Orders   1. Coronary artery disease involving native heart without angina pectoris, unspecified vessel or lesion type     2. Encounter for hepatitis C screening test for low risk patient  Hepatitis C Antibody   3. Need for Tdap vaccination  Tdap (age 10y-63y) IM ([facility])   3. Screening for AAA (abdominal aortic aneurysm)  US SCREENING FOR AAA   5. Need for shingles vaccine  Zoster recombinant [facility])   6. Prostate cancer screening  PSA Screening     Problem List     None          Plan  Orders Placed This Encounter   Procedures    US SCREENING FOR AAA     Standing Status:   Future     Standing Expiration Date:   8/15/2019    Tdap (age 10y-63y) IM ([facility])    Zoster recombinant [facility])    Hepatitis C Antibody     Standing Status:   Future     Standing Expiration Date:   8/15/2019    PSA Screening     Standing Status:   Future     Standing Expiration Date:   8/15/2019     No orders of the defined types were placed in this encounter. No Follow-up on file.   Giulia Queen MD

## 2018-08-27 ENCOUNTER — TELEPHONE (OUTPATIENT)
Dept: FAMILY MEDICINE CLINIC | Age: 68
End: 2018-08-27

## 2018-08-27 ENCOUNTER — HOSPITAL ENCOUNTER (OUTPATIENT)
Dept: ULTRASOUND IMAGING | Age: 68
Discharge: HOME | End: 2018-08-29
Payer: MEDICARE

## 2018-08-27 DIAGNOSIS — Z11.59 ENCOUNTER FOR HEPATITIS C SCREENING TEST FOR LOW RISK PATIENT: ICD-10-CM

## 2018-08-27 DIAGNOSIS — Z13.6 SCREENING FOR AAA (ABDOMINAL AORTIC ANEURYSM): ICD-10-CM

## 2018-08-27 DIAGNOSIS — Z12.5 PROSTATE CANCER SCREENING: ICD-10-CM

## 2018-08-27 LAB
HEPATITIS C ANTIBODY INTERPRETATION: NORMAL
PROSTATE SPECIFIC ANTIGEN: 0.16 NG/ML (ref 0–6.22)

## 2018-08-27 PROCEDURE — 76706 US ABDL AORTA SCREEN AAA: CPT

## 2018-08-27 NOTE — TELEPHONE ENCOUNTER
Forwarding to Dr. Marychuy Tomlin office. Pt came in this office requesting if he can have his shingles vaccine prescription in paper form. His AVS noted that it was sent to [facility]. patient was told he will receive a call when its ready for .

## 2018-08-28 DIAGNOSIS — Z23 NEED FOR SHINGLES VACCINE: ICD-10-CM

## 2019-01-25 LAB
CHOLESTEROL, TOTAL: 120 MG/DL
CHOLESTEROL/HDL RATIO: NORMAL
HDLC SERPL-MCNC: 39 MG/DL (ref 35–70)
LDL CHOLESTEROL CALCULATED: 55 MG/DL (ref 0–160)
TRIGL SERPL-MCNC: 128 MG/DL
VLDLC SERPL CALC-MCNC: 26 MG/DL

## 2019-03-28 RX ORDER — LEVOTHYROXINE SODIUM 0.1 MG/1
TABLET ORAL
Qty: 90 TABLET | Refills: 1 | Status: SHIPPED | OUTPATIENT
Start: 2019-03-28 | End: 2019-10-12 | Stop reason: SDUPTHER

## 2019-05-03 ENCOUNTER — OFFICE VISIT (OUTPATIENT)
Dept: FAMILY MEDICINE CLINIC | Age: 69
End: 2019-05-03
Payer: MEDICARE

## 2019-05-03 VITALS
DIASTOLIC BLOOD PRESSURE: 62 MMHG | WEIGHT: 219 LBS | HEART RATE: 64 BPM | SYSTOLIC BLOOD PRESSURE: 134 MMHG | TEMPERATURE: 97.8 F | HEIGHT: 76 IN | BODY MASS INDEX: 26.67 KG/M2 | OXYGEN SATURATION: 98 % | RESPIRATION RATE: 12 BRPM

## 2019-05-03 DIAGNOSIS — M79.10 MYALGIA: ICD-10-CM

## 2019-05-03 DIAGNOSIS — G51.39 HEMIFACIAL SPASM: Primary | ICD-10-CM

## 2019-05-03 DIAGNOSIS — R07.9 CHEST PAIN, UNSPECIFIED TYPE: ICD-10-CM

## 2019-05-03 LAB — SEDIMENTATION RATE, ERYTHROCYTE: 28 MM (ref 0–20)

## 2019-05-03 PROCEDURE — 99213 OFFICE O/P EST LOW 20 MIN: CPT | Performed by: FAMILY MEDICINE

## 2019-05-03 ASSESSMENT — ENCOUNTER SYMPTOMS
RESPIRATORY NEGATIVE: 1
GASTROINTESTINAL NEGATIVE: 1
EYES NEGATIVE: 1
ALLERGIC/IMMUNOLOGIC NEGATIVE: 1
SHORTNESS OF BREATH: 0

## 2019-05-03 ASSESSMENT — PATIENT HEALTH QUESTIONNAIRE - PHQ9
2. FEELING DOWN, DEPRESSED OR HOPELESS: 0
SUM OF ALL RESPONSES TO PHQ QUESTIONS 1-9: 0
SUM OF ALL RESPONSES TO PHQ QUESTIONS 1-9: 0
SUM OF ALL RESPONSES TO PHQ9 QUESTIONS 1 & 2: 0
1. LITTLE INTEREST OR PLEASURE IN DOING THINGS: 0

## 2019-05-03 NOTE — PROGRESS NOTES
Patient is seen in follow up for   Chief Complaint   Patient presents with    Referral - General     Questioning if he can have a ENT referral. Hemofacial spasms and also has ringing in his ears.  Back Pain     Complaining  mid pain in back and and right shoulder pain.  Health Maintenance     shingrix declines at this time      HPIhere for possible hemifacial spasm. Past Medical History:   Diagnosis Date    CAD (coronary artery disease)     Congenital heart disease     [facility] 10/30/2017    Hyperlipidemia     Hypertension     Hypothyroidism     Knee pain     MI (myocardial infarction) ([facility]      Patient Active Problem List    Diagnosis Date Noted    ST elevation myocardial infarction involving left anterior descending (LAD) coronary artery ([facility])      Priority: High    CAD (coronary artery disease)     Congenital heart disease     Knee pain      Past Surgical History:   Procedure Laterality Date    CARDIAC CATHETERIZATION  16    DR Nevin Farley    CORONARY ANGIOPLASTY WITH STENT PLACEMENT  2008    VASECTOMY       No family history on file. Social History     Socioeconomic History    Marital status:      Spouse name: None    Number of children: None    Years of education: None    Highest education level: None   Occupational History    None   Social Needs    Financial resource strain: None    Food insecurity:     Worry: None     Inability: None    Transportation needs:     Medical: None     Non-medical: None   Tobacco Use    Smoking status: Former Smoker     Packs/day: 1.50     Years: 20.00     Pack years: 30.00     Types: Cigarettes     Last attempt to quit: 1982     Years since quittin.3    Smokeless tobacco: Never Used   Substance and Sexual Activity    Alcohol use:  Yes     Alcohol/week: 1.2 oz     Types: 1 Glasses of wine, 1 Cans of beer per week     Comment: rarely     Drug use: No    Sexual activity: None   Lifestyle    Physical activity: Days per week: None     Minutes per session: None    Stress: None   Relationships    Social connections:     Talks on phone: None     Gets together: None     Attends Worship service: None     Active member of club or organization: None     Attends meetings of clubs or organizations: None     Relationship status: None    Intimate partner violence:     Fear of current or ex partner: None     Emotionally abused: None     Physically abused: None     Forced sexual activity: None   Other Topics Concern    None   Social History Narrative    None     Current Outpatient Medications   Medication Sig Dispense Refill    TURMERIC PO Take by mouth      levothyroxine (SYNTHROID) 100 MCG tablet TAKE 1 TABLET DAILY 90 tablet 1    amLODIPine (NORVASC) 10 MG tablet Take 1 tablet by mouth daily 30 tablet 3    nitroGLYCERIN (NITROSTAT) 0.4 MG SL tablet Place 1 tablet under the tongue every 5 minutes as needed for Chest pain up to max of 3 total doses. If no relief after 1 dose, call 911. 25 tablet 3    Ascorbic Acid (VITAMIN C) 500 MG tablet Take 500 mg by mouth daily      Multiple Vitamins-Minerals (CENTRUM SILVER ULTRA MENS PO) Take  by mouth.  aspirin 81 MG tablet Take 81 mg by mouth daily.  vitamin B-12 (CYANOCOBALAMIN) 1000 MCG tablet Take 1,000 mcg by mouth daily.  polyethylene glycol (GLYCOLAX) powder Take 17 g by mouth daily as needed (constipation) 1530 g 3    predniSONE (DELTASONE) 10 MG tablet 4 po for 4 days 3 po for 4 days 2 po for 4 days 1 po for 4 days 40 tablet 0     No current facility-administered medications for this visit.       Current Outpatient Medications on File Prior to Visit   Medication Sig Dispense Refill    TURMERIC PO Take by mouth      levothyroxine (SYNTHROID) 100 MCG tablet TAKE 1 TABLET DAILY 90 tablet 1    amLODIPine (NORVASC) 10 MG tablet Take 1 tablet by mouth daily 30 tablet 3    nitroGLYCERIN (NITROSTAT) 0.4 MG SL tablet Place 1 tablet under the tongue every 5 well-developed and well-nourished. HENT:   Right Ear: External ear normal.   Left Ear: External ear normal.   Eyes: Pupils are equal, round, and reactive to light. Conjunctivae and EOM are normal.   Neck: Normal range of motion. Neck supple. No thyromegaly present. Cardiovascular: Normal rate, regular rhythm, normal heart sounds and intact distal pulses. Exam reveals no gallop and no friction rub. No murmur heard. Pulmonary/Chest: Effort normal and breath sounds normal. No respiratory distress. He has no wheezes. Abdominal: Soft. Bowel sounds are normal. He exhibits no distension and no mass. There is no tenderness. There is no rebound and no guarding. No hernia. Genitourinary: Penis normal.   Musculoskeletal: Normal range of motion. He exhibits no edema or tenderness. Lymphadenopathy:     He has no cervical adenopathy. Neurological: He is alert and oriented to person, place, and time. No cranial nerve deficit. Coordination normal.   Skin: Skin is warm and dry. Psychiatric: He has a normal mood and affect. slight spasm right sided face. Assessment   Diagnosis Orders   1. Hemifacial spasm     2. Myalgia  Sedimentation Rate   3. Chest pain, unspecified type  XR CHEST STANDARD (2 VW)     Problem List     None          Plan  Follow up in a week. No orders of the defined types were placed in this encounter. No follow-ups on file.   Justa Mccarty MD

## 2019-05-07 ENCOUNTER — OFFICE VISIT (OUTPATIENT)
Dept: FAMILY MEDICINE CLINIC | Age: 69
End: 2019-05-07
Payer: MEDICARE

## 2019-05-07 VITALS
HEART RATE: 56 BPM | OXYGEN SATURATION: 98 % | BODY MASS INDEX: 28.37 KG/M2 | DIASTOLIC BLOOD PRESSURE: 64 MMHG | RESPIRATION RATE: 16 BRPM | WEIGHT: 233 LBS | HEIGHT: 76 IN | SYSTOLIC BLOOD PRESSURE: 128 MMHG | TEMPERATURE: 97.4 F

## 2019-05-07 DIAGNOSIS — M35.3 PMR (POLYMYALGIA RHEUMATICA) (HCC): Primary | ICD-10-CM

## 2019-05-07 PROCEDURE — 99213 OFFICE O/P EST LOW 20 MIN: CPT | Performed by: FAMILY MEDICINE

## 2019-05-07 RX ORDER — PREDNISONE 10 MG/1
TABLET ORAL
Qty: 40 TABLET | Refills: 0 | Status: SHIPPED | OUTPATIENT
Start: 2019-05-07 | End: 2019-05-21 | Stop reason: SDUPTHER

## 2019-05-07 ASSESSMENT — ENCOUNTER SYMPTOMS
ALLERGIC/IMMUNOLOGIC NEGATIVE: 1
GASTROINTESTINAL NEGATIVE: 1
SHORTNESS OF BREATH: 0
RESPIRATORY NEGATIVE: 1
EYES NEGATIVE: 1

## 2019-05-07 NOTE — PROGRESS NOTES
Patient is seen in follow up for   Chief Complaint   Patient presents with    Results     Patient here to discuss result labs from 5/3/19. Sed rate.  Health Maintenance     Declines shingrix      HPIhere for generalized pain esr elevated mild pmr.? Past Medical History:   Diagnosis Date    CAD (coronary artery disease)     Congenital heart disease     [facility] 10/30/2017    Hyperlipidemia     Hypertension     Hypothyroidism     Knee pain     MI (my[facility] infarction) ([facility]      Patient Active Problem List    Diagnosis Date Noted    ST elevation myocardial infarction involving left anterior descending (LAD) coronary artery ([facility])      Priority: High    CAD (coronary artery disease)     Congenital heart disease     Knee pain      Past Surgical History:   Procedure Laterality Date    CARDIAC CATHETERIZATION  16    DR Amalia San    CORONARY ANGIOPLASTY WITH STENT PLACEMENT      VASECTOMY       No family history on file. Social History     Socioeconomic History    Marital status:      Spouse name: None    Number of children: None    Years of education: None    Highest education level: None   Occupational History    None   Social Needs    Financial resource strain: None    Food insecurity:     Worry: None     Inability: None    Transportation needs:     Medical: None     Non-medical: None   Tobacco Use    Smoking status: Former Smoker     Packs/day: 1.50     Years: 20.00     Pack years: 30.00     Types: Cigarettes     Last attempt to quit: 1982     Years since quittin.3    Smokeless tobacco: Never Used   Substance and Sexual Activity    Alcohol use:  Yes     Alcohol/week: 1.2 oz     Types: 1 Glasses of wine, 1 Cans of beer per week     Comment: rarely     Drug use: No    Sexual activity: None   Lifestyle    Physical activity:     Days per week: None     Minutes per session: None    Stress: None   Relationships    Social connections:     Talks on phone: None     Gets together: None     Attends Restoration service: None     Active member of club or organization: None     Attends meetings of clubs or organizations: None     Relationship status: None    Intimate partner violence:     Fear of current or ex partner: None     Emotionally abused: None     Physically abused: None     Forced sexual activity: None   Other Topics Concern    None   Social History Narrative    None     Current Outpatient Medications   Medication Sig Dispense Refill    predniSONE (DELTASONE) 10 MG tablet 4 po for 4 days 3 po for 4 days 2 po for 4 days 1 po for 4 days 40 tablet 0    TURMERIC PO Take by mouth      levothyroxine (SYNTHROID) 100 MCG tablet TAKE 1 TABLET DAILY 90 tablet 1    amLODIPine (NORVASC) 10 MG tablet Take 1 tablet by mouth daily 30 tablet 3    nitroGLYCERIN (NITROSTAT) 0.4 MG SL tablet Place 1 tablet under the tongue every 5 minutes as needed for Chest pain up to max of 3 total doses. If no relief after 1 dose, call 911. 25 tablet 3    Ascorbic Acid (VITAMIN C) 500 MG tablet Take 500 mg by mouth daily      Multiple Vitamins-Minerals (CENTRUM SILVER ULTRA MENS PO) Take  by mouth.  aspirin 81 MG tablet Take 81 mg by mouth daily.  vitamin B-12 (CYANOCOBALAMIN) 1000 MCG tablet Take 1,000 mcg by mouth daily. No current facility-administered medications for this visit. Current Outpatient Medications on File Prior to Visit   Medication Sig Dispense Refill    TURMERIC PO Take by mouth      levothyroxine (SYNTHROID) 100 MCG tablet TAKE 1 TABLET DAILY 90 tablet 1    amLODIPine (NORVASC) 10 MG tablet Take 1 tablet by mouth daily 30 tablet 3    nitroGLYCERIN (NITROSTAT) 0.4 MG SL tablet Place 1 tablet under the tongue every 5 minutes as needed for Chest pain up to max of 3 total doses.  If no relief after 1 dose, call 911. 25 tablet 3    Ascorbic Acid (VITAMIN C) 500 MG tablet Take 500 mg by mouth daily      Multiple Vitamins-Minerals (CENTRUM SILVER ULTRA MENS PO) Take  by mouth.  aspirin 81 MG tablet Take 81 mg by mouth daily.  vitamin B-12 (CYANOCOBALAMIN) 1000 MCG tablet Take 1,000 mcg by mouth daily. No current facility-administered medications on file prior to visit. Allergies   Allergen Reactions    Lipitor [Atorvastatin] Other (See Comments)     myalgias    Celebrex [Celecoxib]     Crestor [Rosuvastatin]     Simvastatin      Health Maintenance   Topic Date Due    Shingles Vaccine (2 of 3) 11/27/2017    Flu vaccine (Season Ended) 09/01/2019    Diabetes screen  10/31/2020    Lipid screen  09/11/2023    Colon cancer screen colonoscopy  02/18/2025    DTaP/Tdap/Td vaccine (2 - Td) 08/15/2028    Pneumococcal 65+ years Vaccine  Completed    AAA screen  Completed    Hepatitis C screen  Completed       Review of Systems     Review of Systems   Constitutional: Negative for activity change, appetite change, chills, fever and unexpected weight change. HENT: Negative. Eyes: Negative. Respiratory: Negative. Negative for shortness of breath. Cardiovascular: Negative. Negative for chest pain and palpitations. Gastrointestinal: Negative. Endocrine: Negative. Genitourinary: Negative. Musculoskeletal: Negative. Skin: Negative. Allergic/Immunologic: Negative. Neurological: Negative. Hematological: Negative. Psychiatric/Behavioral: Negative. Physical Exam  Vitals:    05/07/19 1841   BP: 128/64   Site: Left Upper Arm   Position: Sitting   Cuff Size: Large Adult   Pulse: 56   Resp: 16   Temp: 97.4 °F (36.3 °C)   TempSrc: Oral   SpO2: 98%   Weight: 233 lb (105.7 kg)   Height: 6' 4\" (1.93 m)       Physical Exam   Constitutional: He is oriented to person, place, and time. He appears well-developed and well-nourished. HENT:   Right Ear: External ear normal.   Left Ear: External ear normal.   Eyes: Pupils are equal, round, and reactive to light.  Conjunctivae and EOM are normal.   Neck: Normal range of

## 2019-05-15 ENCOUNTER — OFFICE VISIT (OUTPATIENT)
Dept: FAMILY MEDICINE CLINIC | Age: 69
End: 2019-05-15
Payer: MEDICARE

## 2019-05-15 VITALS
HEIGHT: 76 IN | BODY MASS INDEX: 27.16 KG/M2 | HEART RATE: 70 BPM | OXYGEN SATURATION: 97 % | RESPIRATION RATE: 14 BRPM | DIASTOLIC BLOOD PRESSURE: 70 MMHG | TEMPERATURE: 98.2 F | WEIGHT: 223 LBS | SYSTOLIC BLOOD PRESSURE: 134 MMHG

## 2019-05-15 DIAGNOSIS — M35.3 PMR (POLYMYALGIA RHEUMATICA) (HCC): Primary | ICD-10-CM

## 2019-05-15 DIAGNOSIS — M79.10 MYALGIA: ICD-10-CM

## 2019-05-15 PROCEDURE — 99213 OFFICE O/P EST LOW 20 MIN: CPT | Performed by: FAMILY MEDICINE

## 2019-05-15 RX ORDER — POLYETHYLENE GLYCOL 3350 17 G/17G
17 POWDER, FOR SOLUTION ORAL DAILY PRN
Qty: 1530 G | Refills: 3 | Status: SHIPPED | OUTPATIENT
Start: 2019-05-15 | End: 2019-05-16 | Stop reason: SDUPTHER

## 2019-05-15 ASSESSMENT — ENCOUNTER SYMPTOMS
RESPIRATORY NEGATIVE: 1
GASTROINTESTINAL NEGATIVE: 1
SHORTNESS OF BREATH: 0
EYES NEGATIVE: 1
ALLERGIC/IMMUNOLOGIC NEGATIVE: 1

## 2019-05-15 NOTE — PROGRESS NOTES
Patient is seen in follow up for   Chief Complaint   Patient presents with    Check-Up     PMR follow up 1 week, states his face has been twitching more      HPIher efor follow up on pmr doing better with prednisone     Past Medical History:   Diagnosis Date    CAD (coronary artery disease)     Congenital heart disease     Heart attack ([facility] 10/30/2017    Hyperlipidemia     Hypertension     Hypothyroidism     Knee pain     MI (myocardial infarction) ([facility]      Patient Active Problem List    Diagnosis Date Noted    ST elevation myocardial infarction involving left anterior descending (LAD) coronary artery ([facility]      Priority: High    CAD (coronary artery disease)     Congenital heart disease     Knee pain      Past Surgical History:   Procedure Laterality Date    CARDIAC CATHETERIZATION  16    DR Arturo Lama    CORONARY ANGIOPLASTY WITH STENT PLACEMENT      VASECTOMY       No family history on file. Social History     Socioeconomic History    Marital status:      Spouse name: None    Number of children: None    Years of education: None    Highest education level: None   Occupational History    None   Social Needs    Financial resource strain: None    Food insecurity:     Worry: None     Inability: None    Transportation needs:     Medical: None     Non-medical: None   Tobacco Use    Smoking status: Former Smoker     Packs/day: 1.50     Years: 20.00     Pack years: 30.00     Types: Cigarettes     Last attempt to quit: 1982     Years since quittin.4    Smokeless tobacco: Never Used   Substance and Sexual Activity    Alcohol use:  Yes     Alcohol/week: 1.2 oz     Types: 1 Glasses of wine, 1 Cans of beer per week     Comment: rarely     Drug use: No    Sexual activity: None   Lifestyle    Physical activity:     Days per week: None     Minutes per session: None    Stress: None   Relationships    Social connections:     Talks on phone: None     Gets together: None Attends Pentecostal service: None     Active member of club or organization: None     Attends meetings of clubs or organizations: None     Relationship status: None    Intimate partner violence:     Fear of current or ex partner: None     Emotionally abused: None     Physically abused: None     Forced sexual activity: None   Other Topics Concern    None   Social History Narrative    None     Current Outpatient Medications   Medication Sig Dispense Refill    TURMERIC PO Take by mouth      levothyroxine (SYNTHROID) 100 MCG tablet TAKE 1 TABLET DAILY 90 tablet 1    amLODIPine (NORVASC) 10 MG tablet Take 1 tablet by mouth daily 30 tablet 3    nitroGLYCERIN (NITROSTAT) 0.4 MG SL tablet Place 1 tablet under the tongue every 5 minutes as needed for Chest pain up to max of 3 total doses. If no relief after 1 dose, call 911. 25 tablet 3    Ascorbic Acid (VITAMIN C) 500 MG tablet Take 500 mg by mouth daily      Multiple Vitamins-Minerals (CENTRUM SILVER ULTRA MENS PO) Take  by mouth.  aspirin 81 MG tablet Take 81 mg by mouth daily.  vitamin B-12 (CYANOCOBALAMIN) 1000 MCG tablet Take 1,000 mcg by mouth daily.  predniSONE (DELTASONE) 10 MG tablet 4 po for 4 days 3 po for 4 days 2 po for 4 days 1 po for 4 days 40 tablet 0    oseltamivir (TAMIFLU) 75 MG capsule Take 1 capsule by mouth 2 times daily for 5 days 10 capsule 0    polyethylene glycol (GLYCOLAX) powder Take 17 g by mouth daily as needed (constipation) 1530 g 3     No current facility-administered medications for this visit. Current Outpatient Medications on File Prior to Visit   Medication Sig Dispense Refill    TURMERIC PO Take by mouth      levothyroxine (SYNTHROID) 100 MCG tablet TAKE 1 TABLET DAILY 90 tablet 1    amLODIPine (NORVASC) 10 MG tablet Take 1 tablet by mouth daily 30 tablet 3    nitroGLYCERIN (NITROSTAT) 0.4 MG SL tablet Place 1 tablet under the tongue every 5 minutes as needed for Chest pain up to max of 3 total doses.  If

## 2019-05-16 ENCOUNTER — TELEPHONE (OUTPATIENT)
Dept: FAMILY MEDICINE CLINIC | Age: 69
End: 2019-05-16

## 2019-05-16 ENCOUNTER — OFFICE VISIT (OUTPATIENT)
Dept: FAMILY MEDICINE CLINIC | Age: 69
End: 2019-05-16
Payer: MEDICARE

## 2019-05-16 VITALS
OXYGEN SATURATION: 99 % | HEIGHT: 76 IN | TEMPERATURE: 102.2 F | BODY MASS INDEX: 27.01 KG/M2 | WEIGHT: 221.8 LBS | HEART RATE: 79 BPM | SYSTOLIC BLOOD PRESSURE: 138 MMHG | RESPIRATION RATE: 16 BRPM | DIASTOLIC BLOOD PRESSURE: 70 MMHG

## 2019-05-16 DIAGNOSIS — R05.9 COUGH: ICD-10-CM

## 2019-05-16 DIAGNOSIS — R50.9 FEVER, UNSPECIFIED FEVER CAUSE: Primary | ICD-10-CM

## 2019-05-16 LAB
BILIRUBIN, POC: NORMAL
BLOOD URINE, POC: NORMAL
CLARITY, POC: NORMAL
COLOR, POC: YELLOW
GLUCOSE URINE, POC: NORMAL
INFLUENZA A ANTIBODY: NORMAL
INFLUENZA B ANTIBODY: NORMAL
KETONES, POC: NORMAL
LEUKOCYTE EST, POC: NORMAL
NITRITE, POC: NORMAL
PH, POC: 6
PROTEIN, POC: NORMAL
SPECIFIC GRAVITY, POC: 1.02
UROBILINOGEN, POC: NORMAL

## 2019-05-16 PROCEDURE — 99213 OFFICE O/P EST LOW 20 MIN: CPT | Performed by: NURSE PRACTITIONER

## 2019-05-16 PROCEDURE — 87804 INFLUENZA ASSAY W/OPTIC: CPT | Performed by: NURSE PRACTITIONER

## 2019-05-16 PROCEDURE — 81002 URINALYSIS NONAUTO W/O SCOPE: CPT | Performed by: NURSE PRACTITIONER

## 2019-05-16 RX ORDER — POLYETHYLENE GLYCOL 3350 17 G/17G
17 POWDER, FOR SOLUTION ORAL DAILY PRN
Qty: 1530 G | Refills: 3 | Status: SHIPPED | OUTPATIENT
Start: 2019-05-16 | End: 2019-06-15

## 2019-05-16 ASSESSMENT — ENCOUNTER SYMPTOMS
ABDOMINAL PAIN: 0
SHORTNESS OF BREATH: 0
CONSTIPATION: 0
COUGH: 1
SINUS PAIN: 0
SINUS PRESSURE: 0
SORE THROAT: 0
BACK PAIN: 1
RHINORRHEA: 0
VOMITING: 0
NAUSEA: 0
DIARRHEA: 0
WHEEZING: 0

## 2019-05-16 NOTE — PROGRESS NOTES
Subjective  Hazel Romero, 77 y.o. male presents today with:  Chief Complaint   Patient presents with    Fever     fever and congstion started about 2 days ago, pt took prednisone that helped a little but fever has come back pt is also having body aches and chills. Fever    This is a new problem. The current episode started yesterday. The problem has been waxing and waning. Associated symptoms include congestion and coughing. Pertinent negatives include no abdominal pain, chest pain, diarrhea, ear pain, nausea, sore throat, urinary pain, vomiting or wheezing. Treatments tried: zyrtec, baby aspirin. The treatment provided mild relief. Feels like he has phlegm in his throat that he is unable to clear, denies SOB or trouble breathing    Was started on prednisone 10 days ago, he thinks fever may be attributed to this    Past Medical History:   Diagnosis Date    CAD (coronary artery disease)     Congenital heart disease     [facility]    Hyperlipidemia     Hypertension     Hypothyroidism     Knee pain     MI (my[facility] infarction) ([facility] 2008       Allergies   Allergen Reactions    Lipitor [Atorvastatin] Other (See Comments)     myalgias    Celebrex [Celecoxib]     Crestor [Rosuvastatin]     Simvastatin        Current Outpatient Medications on File Prior to Visit   Medication Sig Dispense Refill    polyethylene glycol (GLYCOLAX) powder Take 17 g by mouth daily as needed (constipation) 1530 g 3    predniSONE (DELTASONE) 10 MG tablet 4 po for 4 days 3 po for 4 days 2 po for 4 days 1 po for 4 days 40 tablet 0    TURMERIC PO Take by mouth      levothyroxine (SYNTHROID) 100 MCG tablet TAKE 1 TABLET DAILY 90 tablet 1    amLODIPine (NORVASC) 10 MG tablet Take 1 tablet by mouth daily 30 tablet 3    nitroGLYCERIN (NITROSTAT) 0.4 MG SL tablet Place 1 tablet under the tongue every 5 minutes as needed for Chest pain up to max of 3 total doses.  If no relief after 1 dose, call proceed with treatment plan. Discussed signs and symptoms which require immediate follow-up in ED/call to 911. Understanding verbalized. I have reviewed and updated the electronic medical record.     Alen Saint, APRN - CNP

## 2019-05-16 NOTE — TELEPHONE ENCOUNTER
Patient calling into the office stating that [facility] can not send him the Polyethylene and would like it sent to [facility].

## 2019-05-17 ENCOUNTER — OFFICE VISIT (OUTPATIENT)
Dept: FAMILY MEDICINE CLINIC | Age: 69
End: 2019-05-17
Payer: MEDICARE

## 2019-05-17 VITALS
OXYGEN SATURATION: 94 % | HEART RATE: 68 BPM | SYSTOLIC BLOOD PRESSURE: 118 MMHG | WEIGHT: 116 LBS | BODY MASS INDEX: 14.13 KG/M2 | RESPIRATION RATE: 18 BRPM | DIASTOLIC BLOOD PRESSURE: 62 MMHG | HEIGHT: 76 IN | TEMPERATURE: 98.8 F

## 2019-05-17 DIAGNOSIS — R50.9 FEVER, UNSPECIFIED FEVER CAUSE: ICD-10-CM

## 2019-05-17 DIAGNOSIS — B34.9 VIRAL SYNDROME: Primary | ICD-10-CM

## 2019-05-17 LAB
ALBUMIN SERPL-MCNC: 3.7 G/DL (ref 3.5–4.6)
ALP BLD-CCNC: 79 U/L (ref 35–104)
ALT SERPL-CCNC: 56 U/L (ref 0–41)
ANION GAP SERPL CALCULATED.3IONS-SCNC: 15 MEQ/L (ref 9–15)
AST SERPL-CCNC: 43 U/L (ref 0–40)
BASOPHILS ABSOLUTE: 0 K/UL (ref 0–0.2)
BASOPHILS RELATIVE PERCENT: 0.4 %
BILIRUB SERPL-MCNC: 0.5 MG/DL (ref 0.2–0.7)
BUN BLDV-MCNC: 17 MG/DL (ref 8–23)
CALCIUM SERPL-MCNC: 8.7 MG/DL (ref 8.5–9.9)
CHLORIDE BLD-SCNC: 86 MEQ/L (ref 95–107)
CO2: 21 MEQ/L (ref 20–31)
CREAT SERPL-MCNC: 1.04 MG/DL (ref 0.7–1.2)
EOSINOPHILS ABSOLUTE: 0.1 K/UL (ref 0–0.7)
EOSINOPHILS RELATIVE PERCENT: 1 %
GFR AFRICAN AMERICAN: >60
GFR NON-AFRICAN AMERICAN: >60
GLOBULIN: 3 G/DL (ref 2.3–3.5)
GLUCOSE BLD-MCNC: 107 MG/DL (ref 70–99)
HCT VFR BLD CALC: 37 % (ref 42–52)
HEMOGLOBIN: 12.6 G/DL (ref 14–18)
LYMPHOCYTES ABSOLUTE: 0.6 K/UL (ref 1–4.8)
LYMPHOCYTES RELATIVE PERCENT: 9.5 %
MCH RBC QN AUTO: 30.6 PG (ref 27–31.3)
MCHC RBC AUTO-ENTMCNC: 34.1 % (ref 33–37)
MCV RBC AUTO: 89.6 FL (ref 80–100)
MONOCYTES ABSOLUTE: 0.6 K/UL (ref 0.2–0.8)
MONOCYTES RELATIVE PERCENT: 10 %
NEUTROPHILS ABSOLUTE: 4.6 K/UL (ref 1.4–6.5)
NEUTROPHILS RELATIVE PERCENT: 79.1 %
PDW BLD-RTO: 12.9 % (ref 11.5–14.5)
PLATELET # BLD: 227 K/UL (ref 130–400)
POTASSIUM SERPL-SCNC: 3.9 MEQ/L (ref 3.4–4.9)
RBC # BLD: 4.12 M/UL (ref 4.7–6.1)
SODIUM BLD-SCNC: 122 MEQ/L (ref 135–144)
TOTAL PROTEIN: 6.7 G/DL (ref 6.3–8)
WBC # BLD: 5.9 K/UL (ref 4.8–10.8)

## 2019-05-17 PROCEDURE — 99213 OFFICE O/P EST LOW 20 MIN: CPT | Performed by: FAMILY MEDICINE

## 2019-05-17 RX ORDER — OSELTAMIVIR PHOSPHATE 75 MG/1
75 CAPSULE ORAL 2 TIMES DAILY
Qty: 10 CAPSULE | Refills: 0 | Status: SHIPPED | OUTPATIENT
Start: 2019-05-17 | End: 2019-05-22

## 2019-05-17 ASSESSMENT — ENCOUNTER SYMPTOMS
ALLERGIC/IMMUNOLOGIC NEGATIVE: 1
SHORTNESS OF BREATH: 0
GASTROINTESTINAL NEGATIVE: 1
EYES NEGATIVE: 1
COUGH: 1

## 2019-05-17 NOTE — PROGRESS NOTES
Patient is seen in follow up for   Chief Complaint   Patient presents with    Fever     Patient here complaining of fever, shivers, sweats and cough. Patient went to Walk in yesterday. Lost 5 lbs in 2 days      HPIhere with flu like symptoms,    Past Medical History:   Diagnosis Date    CAD (coronary artery disease)     Congenital heart disease     Heart attack ([facility] 10/30/2017    Hyperlipidemia     Hypertension     Hypothyroidism     Knee pain     MI (myocardial infarction) ([facility]      Patient Active Problem List    Diagnosis Date Noted    ST elevation myocardial infarction involving left anterior descending (LAD) coronary artery ([facility])      Priority: High    CAD (coronary artery disease)     Congenital heart disease     Knee pain      Past Surgical History:   Procedure Laterality Date    CARDIAC CATHETERIZATION  16    DR Candi Krueger    CORONARY ANGIOPLASTY WITH STENT PLACEMENT  2008    VASECTOMY       No family history on file. Social History     Socioeconomic History    Marital status:      Spouse name: None    Number of children: None    Years of education: None    Highest education level: None   Occupational History    None   Social Needs    Financial resource strain: None    Food insecurity:     Worry: None     Inability: None    Transportation needs:     Medical: None     Non-medical: None   Tobacco Use    Smoking status: Former Smoker     Packs/day: 1.50     Years: 20.00     Pack years: 30.00     Types: Cigarettes     Last attempt to quit: 1982     Years since quittin.3    Smokeless tobacco: Never Used   Substance and Sexual Activity    Alcohol use:  Yes     Alcohol/week: 1.2 oz     Types: 1 Glasses of wine, 1 Cans of beer per week     Comment: rarely     Drug use: No    Sexual activity: None   Lifestyle    Physical activity:     Days per week: None     Minutes per session: None    Stress: None   Relationships    Social connections:     Talks on phone: None 90 tablet 1    amLODIPine (NORVASC) 10 MG tablet Take 1 tablet by mouth daily 30 tablet 3    nitroGLYCERIN (NITROSTAT) 0.4 MG SL tablet Place 1 tablet under the tongue every 5 minutes as needed for Chest pain up to max of 3 total doses. If no relief after 1 dose, call 911. 25 tablet 3    Ascorbic Acid (VITAMIN C) 500 MG tablet Take 500 mg by mouth daily      Multiple Vitamins-Minerals (CENTRUM SILVER ULTRA MENS PO) Take  by mouth.  aspirin 81 MG tablet Take 81 mg by mouth daily.  vitamin B-12 (CYANOCOBALAMIN) 1000 MCG tablet Take 1,000 mcg by mouth daily. No current facility-administered medications on file prior to visit. Allergies   Allergen Reactions    Lipitor [Atorvastatin] Other (See Comments)     myalgias    Celebrex [Celecoxib]     Crestor [Rosuvastatin]     Simvastatin      Health Maintenance   Topic Date Due    Shingles Vaccine (2 of 3) 11/27/2017    Lipid screen  09/11/2023    Colon cancer screen colonoscopy  02/18/2025    DTaP/Tdap/Td vaccine (2 - Td) 08/15/2028    Flu vaccine  Completed    Pneumococcal 65+ years Vaccine  Completed    AAA screen  Completed    Hepatitis C screen  Completed       Review of Systems     Review of Systems   Constitutional: Positive for chills, fatigue and fever. Negative for activity change, appetite change and unexpected weight change. HENT: Negative. Eyes: Negative. Respiratory: Positive for cough. Negative for shortness of breath. Cardiovascular: Negative. Negative for chest pain and palpitations. Gastrointestinal: Negative. Endocrine: Negative. Genitourinary: Negative. Musculoskeletal: Negative. Skin: Negative. Allergic/Immunologic: Negative. Neurological: Positive for headaches. Hematological: Negative. Psychiatric/Behavioral: Negative.         Physical Exam  Vitals:    05/17/19 1451   BP: 118/62   Site: Left Upper Arm   Position: Sitting   Cuff Size: Large Adult   Pulse: 68   Resp: 18   Temp: 98.8 °F (37.1 °C)   TempSrc: Oral   SpO2: 94%   Weight: 116 lb (52.6 kg)   Height: 6' 4\" (1.93 m)       Physical Exam   Constitutional: He is oriented to person, place, and time. He appears well-developed and well-nourished. HENT:   Right Ear: External ear normal.   Left Ear: External ear normal.   Eyes: Pupils are equal, round, and reactive to light. Conjunctivae and EOM are normal.   Neck: Normal range of motion. Neck supple. No thyromegaly present. Cardiovascular: Normal rate, regular rhythm, normal heart sounds and intact distal pulses. Exam reveals no gallop and no friction rub. No murmur heard. Pulmonary/Chest: Effort normal and breath sounds normal. No respiratory distress. He has no wheezes. Abdominal: Soft. Bowel sounds are normal. He exhibits no distension and no mass. There is no tenderness. There is no rebound and no guarding. No hernia. Genitourinary: Penis normal.   Musculoskeletal: Normal range of motion. He exhibits no edema or tenderness. Lymphadenopathy:     He has no cervical adenopathy. Neurological: He is alert and oriented to person, place, and time. No cranial nerve deficit. Coordination normal.   Skin: Skin is warm and dry. Psychiatric: He has a normal mood and affect. Assessment   Diagnosis Orders   1. Viral syndrome       Problem List     None          Plan  Suspect flu call if getting worse. Orders Placed This Encounter   Medications    oseltamivir (TAMIFLU) 75 MG capsule     Sig: Take 1 capsule by mouth 2 times daily for 5 days     Dispense:  10 capsule     Refill:  0     No follow-ups on file.   Damir Dunn MD

## 2019-05-19 LAB — URINE CULTURE, ROUTINE: NORMAL

## 2019-05-21 RX ORDER — PREDNISONE 10 MG/1
TABLET ORAL
Qty: 40 TABLET | Refills: 0 | Status: SHIPPED | OUTPATIENT
Start: 2019-05-21 | End: 2019-05-28

## 2019-05-22 ENCOUNTER — TELEPHONE (OUTPATIENT)
Dept: FAMILY MEDICINE CLINIC | Age: 69
End: 2019-05-22

## 2019-05-22 NOTE — TELEPHONE ENCOUNTER
Pt called stating that he would like to talk to you about his Rx prednisone, Pt did not give me much detail. Pt stating that he was told to call you after his office visit on 5/17/19. Pt did not want to make appt when asked. Please advise.

## 2019-05-24 NOTE — TELEPHONE ENCOUNTER
Francisco J had called back asking about the prednisone again. He was hoping to get the 2.5 mg and taper down with those. Will you consider that for him?

## 2019-05-28 ENCOUNTER — OFFICE VISIT (OUTPATIENT)
Dept: FAMILY MEDICINE CLINIC | Age: 69
End: 2019-05-28
Payer: MEDICARE

## 2019-05-28 VITALS
RESPIRATION RATE: 16 BRPM | DIASTOLIC BLOOD PRESSURE: 68 MMHG | BODY MASS INDEX: 26.18 KG/M2 | SYSTOLIC BLOOD PRESSURE: 128 MMHG | OXYGEN SATURATION: 99 % | HEIGHT: 76 IN | WEIGHT: 215 LBS | HEART RATE: 64 BPM | TEMPERATURE: 97.9 F

## 2019-05-28 DIAGNOSIS — J06.9 UPPER RESPIRATORY TRACT INFECTION, UNSPECIFIED TYPE: Primary | ICD-10-CM

## 2019-05-28 PROCEDURE — 99213 OFFICE O/P EST LOW 20 MIN: CPT | Performed by: NURSE PRACTITIONER

## 2019-05-28 RX ORDER — CARBAMAZEPINE 100 MG/1
TABLET, CHEWABLE ORAL
Refills: 0 | COMMUNITY
Start: 2019-05-20 | End: 2019-09-18

## 2019-05-28 RX ORDER — CARVEDILOL 3.12 MG/1
3.12 TABLET ORAL 2 TIMES DAILY
COMMUNITY
Start: 2019-05-25

## 2019-05-28 RX ORDER — CEFDINIR 300 MG/1
300 CAPSULE ORAL 2 TIMES DAILY
Qty: 20 CAPSULE | Refills: 0 | Status: SHIPPED | OUTPATIENT
Start: 2019-05-28 | End: 2019-06-03 | Stop reason: ALTCHOICE

## 2019-05-28 RX ORDER — AMLODIPINE BESYLATE 5 MG/1
TABLET ORAL DAILY
COMMUNITY
Start: 2019-05-25

## 2019-05-28 RX ORDER — DEXTROMETHORPHAN HYDROBROMIDE AND PROMETHAZINE HYDROCHLORIDE 15; 6.25 MG/5ML; MG/5ML
5 SYRUP ORAL 4 TIMES DAILY PRN
Qty: 150 ML | Refills: 0 | Status: SHIPPED | OUTPATIENT
Start: 2019-05-28 | End: 2019-06-04

## 2019-05-28 ASSESSMENT — ENCOUNTER SYMPTOMS
SORE THROAT: 0
ABDOMINAL PAIN: 0
VOMITING: 0
DIARRHEA: 0
SWOLLEN GLANDS: 0
TROUBLE SWALLOWING: 0
NAUSEA: 0
SHORTNESS OF BREATH: 0
RHINORRHEA: 0
EYES NEGATIVE: 1
WHEEZING: 0
SINUS PRESSURE: 1
COUGH: 1
FACIAL SWELLING: 0
SINUS PAIN: 0

## 2019-05-28 NOTE — PROGRESS NOTES
Subjective:     Patient ID: Savanna Sotelo is a 77 y.o. male who presentstoday for:  Chief Complaint   Patient presents with    Cough     Pt. is here for a cough  X 1 week. Pt. has been taking Mucinex with no relief and also Nyquil with mild relief.  Health Maintenance     Pt. declines the shingles vaccine. URI    This is a new problem. The current episode started in the past 7 days. The problem has been waxing and waning. There has been no fever. Associated symptoms include congestion and coughing. Pertinent negatives include no abdominal pain, chest pain, diarrhea, dysuria, ear pain, headaches, joint pain, joint swelling, nausea, neck pain, plugged ear sensation, rash, rhinorrhea, sinus pain, sneezing, sore throat, swollen glands, vomiting or wheezing. He has tried decongestant for the symptoms. The treatment provided mild relief. Past Medical History:   Diagnosis Date    CAD (coronary artery disease)     Congenital heart disease     Heart attack ([facility] 10/30/2017    Hyperlipidemia     Hypertension     Hypothyroidism     Knee pain     MI (myocardial infarction) ([facility] 2008     Current Outpatient Medications on File Prior to Visit   Medication Sig Dispense Refill    amLODIPine (NORVASC) 5 MG tablet       carBAMazepine (TEGRETOL) 100 MG chewable tablet chew and swallow 1 tablet by mouth three times a day  0    carvedilol (COREG) 3.125 MG tablet       polyethylene glycol (GLYCOLAX) powder Take 17 g by mouth daily as needed (constipation) 1530 g 3    TURMERIC PO Take by mouth      levothyroxine (SYNTHROID) 100 MCG tablet TAKE 1 TABLET DAILY 90 tablet 1    nitroGLYCERIN (NITROSTAT) 0.4 MG SL tablet Place 1 tablet under the tongue every 5 minutes as needed for Chest pain up to max of 3 total doses.  If no relief after 1 dose, call 911. 25 tablet 3    Ascorbic Acid (VITAMIN C) 500 MG tablet Take 500 mg by mouth daily      Multiple Vitamins-Minerals (CENTRUM SILVER ULTRA MENS PO) Take  by mouth.      aspirin 81 MG tablet Take 81 mg by mouth daily.  vitamin B-12 (CYANOCOBALAMIN) 1000 MCG tablet Take 1,000 mcg by mouth daily. No current facility-administered medications on file prior to visit. Past Surgical History:   Procedure Laterality Date    [facility]  16     [facility]    CORONARY ANGIOPLASTY WITH STENT PLACEMENT  2008    VASECTOMY        No family history on file. Social History     Socioeconomic History    Marital status:      Spouse name: Not on file    Number of children: Not on file    Years of education: Not on file    Highest education level: Not on file   Occupational History    Not on file   Social Needs    Financial resource strain: Not on file    Food insecurity:     Worry: Not on file     Inability: Not on file    Transportation needs:     Medical: Not on file     Non-medical: Not on file   Tobacco Use    Smoking status: Former Smoker     Packs/day: 1.50     Years: 20.00     Pack years: 30.00     Types: Cigarettes     Last attempt to quit: 1982     Years since quittin.4    Smokeless tobacco: Never Used   Substance and Sexual Activity    Alcohol use:  Yes     Alcohol/week: 1.2 oz     Types: 1 Glasses of wine, 1 Cans of beer per week     Comment: rarely     Drug use: No    Sexual activity: Not on file   Lifestyle    Physical activity:     Days per week: Not on file     Minutes per session: Not on file    Stress: Not on file   Relationships    Social connections:     Talks on phone: Not on file     Gets together: Not on file     Attends Catholic service: Not on file     Active member of club or organization: Not on file     Attends meetings of clubs or organizations: Not on file     Relationship status: Not on file    Intimate partner violence:     Fear of current or ex partner: Not on file     Emotionally abused: Not on file     Physically abused: Not on file     Forced sexual activity: Not on file   Other Topics Concern  Not on file   Social History Narrative    Not on file     Allergies:  Lipitor [atorvastatin]; Celebrex [celecoxib]; Crestor [rosuvastatin]; and Simvastatin    Review of Systems   Constitutional: Negative for chills, diaphoresis, fatigue and fever. HENT: Positive for congestion, postnasal drip and sinus pressure. Negative for ear pain, facial swelling, rhinorrhea, sinus pain, sneezing, sore throat and trouble swallowing. Eyes: Negative. Respiratory: Positive for cough. Negative for shortness of breath and wheezing. Cardiovascular: Negative for chest pain. Gastrointestinal: Negative for abdominal pain, diarrhea, nausea and vomiting. Genitourinary: Negative for dysuria. Musculoskeletal: Negative for joint pain and neck pain. Skin: Negative for rash. Neurological: Negative for dizziness, weakness, light-headedness and headaches. Objective:    /68 (Site: Left Upper Arm, Position: Sitting, Cuff Size: Medium Adult)   Pulse 64   Temp 97.9 °F (36.6 °C) (Oral)   Resp 16   Ht 6' 4\" (1.93 m)   Wt 215 lb (97.5 kg)   SpO2 99%   BMI 26.17 kg/m²     Physical Exam   Constitutional: He is oriented to person, place, and time. He appears well-developed and well-nourished. No distress. HENT:   Head: Normocephalic and atraumatic. Right Ear: Tympanic membrane, external ear and ear canal normal.   Left Ear: Tympanic membrane, external ear and ear canal normal.   Nose: Nose normal.   Mouth/Throat: Uvula is midline and mucous membranes are normal. Posterior oropharyngeal erythema present. No oropharyngeal exudate or posterior oropharyngeal edema. Eyes: Conjunctivae are normal. Right eye exhibits no discharge. Left eye exhibits no discharge. Neck: Normal range of motion. Neck supple. Cardiovascular: Normal rate, regular rhythm and normal heart sounds. Pulmonary/Chest: Effort normal and breath sounds normal. No respiratory distress. He has no decreased breath sounds. He has no wheezes. He has no rhonchi. He has no rales. Musculoskeletal: He exhibits no edema. Lymphadenopathy:     He has no cervical adenopathy. Neurological: He is alert and oriented to person, place, and time. Skin: Skin is warm. No rash noted. He is not diaphoretic. Assessment & Plan:       Diagnosis Orders   1. Upper respiratory tract infection, unspecified type  promethazine-dextromethorphan (PROMETHAZINE-DM) 6.25-15 MG/5ML syrup    cefdinir (OMNICEF) 300 MG capsule     No orders of the defined types were placed in this encounter. Orders Placed This Encounter   Medications    promethazine-dextromethorphan (PROMETHAZINE-DM) 6.25-15 MG/5ML syrup     Sig: Take 5 mLs by mouth 4 times daily as needed for Cough     Dispense:  150 mL     Refill:  0    cefdinir (OMNICEF) 300 MG capsule     Sig: Take 1 capsule by mouth 2 times daily for 10 days     Dispense:  20 capsule     Refill:  0     Medications Discontinued During This Encounter   Medication Reason    predniSONE (DELTASONE) 10 MG tablet     amLODIPine (NORVASC) 10 MG tablet     promethazine-dextromethorphan (PROMETHAZINE-DM) 6.25-15 MG/5ML syrup REORDER    cefdinir (OMNICEF) 300 MG capsule REORDER     Return if symptoms worsen or fail to improve, for PCP follow-up. Reviewed with the patient: currentclinical status, medications, activities and diet. Side effects, adverse effects of the medicationprescribed today, as well as treatment plan/ rationale and result expectations havebeen discussed with the patient who expresses understanding and desires to proceed. Pt instructions reviewed and given to patient.     Close follow up to evaluate treatment resultsand for coordination of care. I have reviewed the patient's medical historyin detail and updated the computerized patient record.     Zenaida Long, APRN - CNP

## 2019-06-03 ENCOUNTER — OFFICE VISIT (OUTPATIENT)
Dept: FAMILY MEDICINE CLINIC | Age: 69
End: 2019-06-03
Payer: MEDICARE

## 2019-06-03 VITALS
HEART RATE: 59 BPM | BODY MASS INDEX: 26.18 KG/M2 | RESPIRATION RATE: 16 BRPM | SYSTOLIC BLOOD PRESSURE: 124 MMHG | HEIGHT: 76 IN | OXYGEN SATURATION: 96 % | DIASTOLIC BLOOD PRESSURE: 64 MMHG | TEMPERATURE: 97.5 F | WEIGHT: 215 LBS

## 2019-06-03 DIAGNOSIS — J20.9 ACUTE BRONCHITIS, UNSPECIFIED ORGANISM: Primary | ICD-10-CM

## 2019-06-03 PROCEDURE — 99213 OFFICE O/P EST LOW 20 MIN: CPT | Performed by: NURSE PRACTITIONER

## 2019-06-03 RX ORDER — ALBUTEROL SULFATE 90 UG/1
2 AEROSOL, METERED RESPIRATORY (INHALATION) EVERY 6 HOURS PRN
Qty: 1 INHALER | Refills: 0 | Status: SHIPPED | OUTPATIENT
Start: 2019-06-03 | End: 2020-08-04

## 2019-06-03 RX ORDER — LEVOFLOXACIN 500 MG/1
500 TABLET, FILM COATED ORAL DAILY
Qty: 10 TABLET | Refills: 0 | Status: SHIPPED | OUTPATIENT
Start: 2019-06-03 | End: 2019-06-13

## 2019-06-03 ASSESSMENT — ENCOUNTER SYMPTOMS
EYES NEGATIVE: 1
DIARRHEA: 0
WHEEZING: 0
RHINORRHEA: 0
NAUSEA: 0
SWOLLEN GLANDS: 0
SHORTNESS OF BREATH: 1
SORE THROAT: 0
VOMITING: 0
ABDOMINAL PAIN: 0
COUGH: 1
TROUBLE SWALLOWING: 0
SINUS PAIN: 0
FACIAL SWELLING: 0
SINUS PRESSURE: 1

## 2019-06-03 NOTE — PROGRESS NOTES
Subjective:     Patient ID: Vinny Serrano is a 77 y.o. male who presentstoday for:  Chief Complaint   Patient presents with    Cough     Pt. is here for a recurrent cough. Pt. was seen on 05/28/2019 and prescribed Promethazine cough syrup and Omnicef with moderate relief. Pt. states the cough is starting to be productive. Pt. c/o burning in his chest that radiates into his back.  Health Maintenance     Pt. declines the Shingles vaccine. URI    This is a new problem. The current episode started 1 to 4 weeks ago. The problem has been gradually improving. There has been no fever. Associated symptoms include congestion and coughing. Pertinent negatives include no abdominal pain, chest pain, diarrhea, dysuria, ear pain, headaches, joint pain, joint swelling, nausea, neck pain, plugged ear sensation, rash, rhinorrhea, sinus pain, sneezing, sore throat, swollen glands, vomiting or wheezing. He has tried decongestant (5 days of omnicef) for the symptoms. The treatment provided moderate relief.        Past Medical History:   Diagnosis Date    CAD (coronary artery disease)     Congenital heart disease     Heart attack ([facility] 10/30/2017    Hyperlipidemia     Hypertension     Hypothyroidism     Knee pain     MI (myocardial infarction) ([facility] 2008     Current Outpatient Medications on File Prior to Visit   Medication Sig Dispense Refill    amLODIPine (NORVASC) 5 MG tablet       carBAMazepine (TEGRETOL) 100 MG chewable tablet chew and swallow 1 tablet by mouth three times a day  0    carvedilol (COREG) 3.125 MG tablet       promethazine-dextromethorphan (PROMETHAZINE-DM) 6.25-15 MG/5ML syrup Take 5 mLs by mouth 4 times daily as needed for Cough 150 mL 0    cefdinir (OMNICEF) 300 MG capsule Take 1 capsule by mouth 2 times daily for 10 days 20 capsule 0    polyethylene glycol (GLYCOLAX) powder Take 17 g by mouth daily as needed (constipation) 1530 g 3    TURMERIC PO Take by mouth      levothyroxine (SYNTHROID) 100 MCG tablet TAKE 1 TABLET DAILY 90 tablet 1    nitroGLYCERIN (NITROSTAT) 0.4 MG SL tablet Place 1 tablet under the tongue every 5 minutes as needed for Chest pain up to max of 3 total doses. If no relief after 1 dose, call 911. 25 tablet 3    Ascorbic Acid (VITAMIN C) 500 MG tablet Take 500 mg by mouth daily      Multiple Vitamins-Minerals (CENTRUM SILVER ULTRA MENS PO) Take  by mouth.  aspirin 81 MG tablet Take 81 mg by mouth daily.  vitamin B-12 (CYANOCOBALAMIN) 1000 MCG tablet Take 1,000 mcg by mouth daily. No current facility-administered medications on file prior to visit. Past Surgical History:   Procedure Laterality Date    CARDIAC CATHETERIZATION  16    DR Cielo Lyons    CORONARY ANGIOPLASTY WITH STENT PLACEMENT      VASECTOMY        No family history on file. Social History     Socioeconomic History    Marital status:      Spouse name: Not on file    Number of children: Not on file    Years of education: Not on file    Highest education level: Not on file   Occupational History    Not on file   Social Needs    Financial resource strain: Not on file    Food insecurity:     Worry: Not on file     Inability: Not on file    Transportation needs:     Medical: Not on file     Non-medical: Not on file   Tobacco Use    Smoking status: Former Smoker     Packs/day: 1.50     Years: 20.00     Pack years: 30.00     Types: Cigarettes     Last attempt to quit: 1982     Years since quittin.4    Smokeless tobacco: Never Used   Substance and Sexual Activity    Alcohol use:  Yes     Alcohol/week: 1.2 oz     Types: 1 Glasses of wine, 1 Cans of beer per week     Comment: rarely     Drug use: No    Sexual activity: Not on file   Lifestyle    Physical activity:     Days per week: Not on file     Minutes per session: Not on file    Stress: Not on file   Relationships    Social connections:     Talks on phone: Not on file     Gets together: Not on file Attends Catholic service: Not on file     Active member of club or organization: Not on file     Attends meetings of clubs or organizations: Not on file     Relationship status: Not on file    Intimate partner violence:     Fear of current or ex partner: Not on file     Emotionally abused: Not on file     Physically abused: Not on file     Forced sexual activity: Not on file   Other Topics Concern    Not on file   Social History Narrative    Not on file     Allergies:  Lipitor [atorvastatin]; Celebrex [celecoxib]; Crestor [rosuvastatin]; and Simvastatin    Review of Systems   Constitutional: Negative for activity change, appetite change, chills, diaphoresis, fatigue, fever and unexpected weight change. HENT: Positive for congestion, postnasal drip and sinus pressure. Negative for ear pain, facial swelling, rhinorrhea, sinus pain, sneezing, sore throat and trouble swallowing. Eyes: Negative. Respiratory: Positive for cough and shortness of breath. Negative for wheezing. Cardiovascular: Negative for chest pain. Gastrointestinal: Negative for abdominal pain, diarrhea, nausea and vomiting. Genitourinary: Negative for dysuria. Musculoskeletal: Negative for joint pain and neck pain. Skin: Negative for rash. Neurological: Negative for dizziness, weakness, light-headedness and headaches. Objective:    /64 (Site: Left Upper Arm, Position: Sitting, Cuff Size: Medium Adult)   Pulse 59   Temp 97.5 °F (36.4 °C) (Oral)   Resp 16   Ht 6' 4\" (1.93 m)   Wt 215 lb (97.5 kg)   SpO2 96%   BMI 26.17 kg/m²     Physical Exam   Constitutional: He is oriented to person, place, and time. He appears well-developed and well-nourished. No distress. HENT:   Head: Normocephalic and atraumatic.    Right Ear: Tympanic membrane, external ear and ear canal normal.   Left Ear: Tympanic membrane, external ear and ear canal normal.   Nose: Nose normal.   Mouth/Throat: Uvula is midline and mucous membranes are normal. Posterior oropharyngeal erythema present. No oropharyngeal exudate or posterior oropharyngeal edema. Eyes: Conjunctivae are normal. Right eye exhibits no discharge. Left eye exhibits no discharge. Neck: Normal range of motion. Neck supple. Cardiovascular: Normal rate, regular rhythm and normal heart sounds. Pulmonary/Chest: Effort normal and breath sounds normal. No respiratory distress. He has no decreased breath sounds. He has no wheezes. He has no rhonchi. He has no rales. Abdominal: Soft. Bowel sounds are normal. There is no tenderness. Musculoskeletal: He exhibits no edema. Lymphadenopathy:     He has no cervical adenopathy. Neurological: He is alert and oriented to person, place, and time. Skin: Skin is warm. No rash noted. He is not diaphoretic. Assessment & Plan:       Diagnosis Orders   1. Acute bronchitis, unspecified organism  XR CHEST STANDARD (2 VW)    albuterol sulfate  (90 Base) MCG/ACT inhaler     Orders Placed This Encounter   Procedures    XR CHEST STANDARD (2 VW)     Standing Status:   Future     Number of Occurrences:   1     Standing Expiration Date:   6/3/2020     Orders Placed This Encounter   Medications    albuterol sulfate  (90 Base) MCG/ACT inhaler     Sig: Inhale 2 puffs into the lungs every 6 hours as needed for Wheezing or Shortness of Breath     Dispense:  1 Inhaler     Refill:  0     There are no discontinued medications. Return in about 1 week (around 6/10/2019). Reviewed with the patient: currentclinical status, medications, activities and diet. Side effects, adverse effects of the medicationprescribed today, as well as treatment plan/ rationale and result expectations havebeen discussed with the patient who expresses understanding and desires to proceed. Pt instructions reviewed and given to patient.     Close follow up to evaluate treatment resultsand for coordination of care.   I have reviewed the patient's medical historyin detail and updated the computerized patient record.     Jesenia He, HERBER - CNP

## 2019-06-03 NOTE — PROGRESS NOTES
Subjective:     Patient ID: Savanna Sotelo is a 77 y.o. male who presentstoday for:  Chief Complaint   Patient presents with    Cough     Pt. is here for a recurrent cough. Pt. was seen on 05/28/2019 and prescribed Promethazine cough syrup and Omnicef with moderate relief. Pt. states the cough is starting to be productive. Pt. c/o burning in his chest that radiates into his back.  Health Maintenance     Pt. declines the Shingles vaccine. HPI    Past Medical History:   Diagnosis Date    CAD (coronary artery disease)     Congenital heart disease     Heart [facility] ([facility] 10/30/2017    Hyperlipidemia     Hypertension     Hypothyroidism     Knee pain     MI (myocardial infarction) ([facility] 2008     Current Outpatient Medications on File Prior to Visit   Medication Sig Dispense Refill    amLODIPine (NORVASC) 5 MG tablet       carBAMazepine (TEGRETOL) 100 MG chewable tablet chew and swallow 1 tablet by mouth three times a day  0    carvedilol (COREG) 3.125 MG tablet       promethazine-dextromethorphan (PROMETHAZINE-DM) 6.25-15 MG/5ML syrup Take 5 mLs by mouth 4 times daily as needed for Cough 150 mL 0    cefdinir (OMNICEF) 300 MG capsule Take 1 capsule by mouth 2 times daily for 10 days 20 capsule 0    polyethylene glycol (GLYCOLAX) powder Take 17 g by mouth daily as needed (constipation) 1530 g 3    TURMERIC PO Take by mouth      levothyroxine (SYNTHROID) 100 MCG tablet TAKE 1 TABLET DAILY 90 tablet 1    nitroGLYCERIN (NITROSTAT) 0.4 MG SL tablet Place 1 tablet under the tongue every 5 minutes as needed for Chest pain up to max of 3 total doses. If no relief after 1 dose, call 911. 25 tablet 3    Ascorbic Acid (VITAMIN C) 500 MG tablet Take 500 mg by mouth daily      Multiple Vitamins-Minerals (CENTRUM SILVER ULTRA MENS PO) Take  by mouth.  aspirin 81 MG tablet Take 81 mg by mouth daily.  vitamin B-12 (CYANOCOBALAMIN) 1000 MCG tablet Take 1,000 mcg by mouth daily.        No current facility-administered medications on file prior to visit. Past Surgical History:   Procedure Laterality Date    CARDIAC CATHETERIZATION  16    DR Yasemin Solares    CORONARY ANGIOPLASTY WITH STENT PLACEMENT  2008    VASECTOMY        No family history on file. Social History     Socioeconomic History    Marital status:      Spouse name: Not on file    Number of children: Not on file    Years of education: Not on file    Highest education level: Not on file   Occupational History    Not on file   Social Needs    Financial resource strain: Not on file    Food insecurity:     Worry: Not on file     Inability: Not on file    Transportation needs:     Medical: Not on file     Non-medical: Not on file   Tobacco Use    Smoking status: Former Smoker     Packs/day: 1.50     Years: 20.00     Pack years: 30.00     Types: Cigarettes     Last attempt to quit: 1982     Years since quittin.4    Smokeless tobacco: Never Used   Substance and Sexual Activity    Alcohol use: Yes     Alcohol/week: 1.2 oz     Types: 1 Glasses of wine, 1 Cans of beer per week     Comment: rarely     Drug use: No    Sexual activity: Not on file   Lifestyle    Physical activity:     Days per week: Not on file     Minutes per session: Not on file    Stress: Not on file   Relationships    Social connections:     Talks on phone: Not on file     Gets together: Not on file     Attends Muslim service: Not on file     Active member of club or organization: Not on file     Attends meetings of clubs or organizations: Not on file     Relationship status: Not on file    Intimate partner violence:     Fear of current or ex partner: Not on file     Emotionally abused: Not on file     Physically abused: Not on file     Forced sexual activity: Not on file   Other Topics Concern    Not on file   Social History Narrative    Not on file     Allergies:  Lipitor [atorvastatin]; Celebrex [celecoxib];  Crestor [rosuvastatin]; and Simvastatin    Review of Systems    Objective:    /64 (Site: Left Upper Arm, Position: Sitting, Cuff Size: Medium Adult)   Pulse 59   Temp 97.5 °F (36.4 °C) (Oral)   Resp 16   Ht 6' 4\" (1.93 m)   Wt 215 lb (97.5 kg)   SpO2 96%   BMI 26.17 kg/m²     Physical Exam    Assessment & Plan:    {No diagnosis found. (Refresh or delete this SmartLink)}  No orders of the defined types were placed in this encounter. No orders of the defined types were placed in this encounter. There are no discontinued medications. No follow-ups on file. Reviewed with the patient: currentclinical status, medications, activities and diet. Side effects, adverse effects of the medicationprescribed today, as well as treatment plan/ rationale and result expectations havebeen discussed with the patient who expresses understanding and desires to proceed. Pt instructions reviewed and given to patient.     Close follow up to evaluate treatment resultsand for coordination of care. I have reviewed the patient's medical historyin detail and updated the computerized patient record.     Coby Cruz, HERBER - CNP

## 2019-06-04 ENCOUNTER — HOSPITAL ENCOUNTER (EMERGENCY)
Age: 69
Discharge: HOME | End: 2019-06-04
Attending: EMERGENCY MEDICINE
Payer: MEDICARE

## 2019-06-04 ENCOUNTER — APPOINTMENT (OUTPATIENT)
Dept: GENERAL RADIOLOGY | Age: 69
End: 2019-06-04
Payer: MEDICARE

## 2019-06-04 ENCOUNTER — APPOINTMENT (OUTPATIENT)
Dept: CT IMAGING | Age: 69
End: 2019-06-04
Payer: MEDICARE

## 2019-06-04 ENCOUNTER — PATIENT MESSAGE (OUTPATIENT)
Dept: FAMILY MEDICINE CLINIC | Age: 69
End: 2019-06-04

## 2019-06-04 VITALS
RESPIRATION RATE: 14 BRPM | BODY MASS INDEX: 26.79 KG/M2 | TEMPERATURE: 98.5 F | OXYGEN SATURATION: 99 % | HEART RATE: 78 BPM | WEIGHT: 220 LBS | HEIGHT: 76 IN | DIASTOLIC BLOOD PRESSURE: 72 MMHG | SYSTOLIC BLOOD PRESSURE: 132 MMHG

## 2019-06-04 DIAGNOSIS — J18.9 GRANULOMATOUS PNEUMONIA: Primary | ICD-10-CM

## 2019-06-04 DIAGNOSIS — J84.10 GRANULOMATOUS LUNG DISEASE (HCC): Primary | ICD-10-CM

## 2019-06-04 DIAGNOSIS — J18.9 GRANULOMATOUS PNEUMONIA: ICD-10-CM

## 2019-06-04 LAB
ANION GAP SERPL CALCULATED.3IONS-SCNC: 11 MEQ/L (ref 9–15)
BASOPHILS ABSOLUTE: 0 K/UL (ref 0–0.2)
BASOPHILS RELATIVE PERCENT: 0.2 %
BUN BLDV-MCNC: 15 MG/DL (ref 8–23)
CALCIUM SERPL-MCNC: 9.3 MG/DL (ref 8.5–9.9)
CHLORIDE BLD-SCNC: 95 MEQ/L (ref 95–107)
CO2: 23 MEQ/L (ref 20–31)
CREAT SERPL-MCNC: 0.87 MG/DL (ref 0.7–1.2)
D DIMER: 0.57 MG/L FEU (ref 0–0.5)
EKG ATRIAL RATE: 68 BPM
EKG P-R INTERVAL: 220 MS
EKG Q-T INTERVAL: 404 MS
EKG QRS DURATION: 88 MS
EKG QTC CALCULATION (BAZETT): 429 MS
EKG R AXIS: -15 DEGREES
EKG T AXIS: 117 DEGREES
EKG VENTRICULAR RATE: 68 BPM
EOSINOPHILS ABSOLUTE: 0.6 K/UL (ref 0–0.7)
EOSINOPHILS RELATIVE PERCENT: 9.3 %
GFR AFRICAN AMERICAN: >60
GFR NON-AFRICAN AMERICAN: >60
GLUCOSE BLD-MCNC: 111 MG/DL (ref 70–99)
HCT VFR BLD CALC: 35.9 % (ref 42–52)
HEMOGLOBIN: 12.6 G/DL (ref 14–18)
LYMPHOCYTES ABSOLUTE: 1.5 K/UL (ref 1–4.8)
LYMPHOCYTES RELATIVE PERCENT: 23.9 %
MCH RBC QN AUTO: 31.1 PG (ref 27–31.3)
MCHC RBC AUTO-ENTMCNC: 35 % (ref 33–37)
MCV RBC AUTO: 88.8 FL (ref 80–100)
MONOCYTES ABSOLUTE: 0.9 K/UL (ref 0.2–0.8)
MONOCYTES RELATIVE PERCENT: 14.2 %
NEUTROPHILS ABSOLUTE: 3.3 K/UL (ref 1.4–6.5)
NEUTROPHILS RELATIVE PERCENT: 52.4 %
PDW BLD-RTO: 13 % (ref 11.5–14.5)
PLATELET # BLD: 391 K/UL (ref 130–400)
POTASSIUM SERPL-SCNC: 4.9 MEQ/L (ref 3.4–4.9)
PRO-BNP: 354 PG/ML
RBC # BLD: 4.04 M/UL (ref 4.7–6.1)
SODIUM BLD-SCNC: 129 MEQ/L (ref 135–144)
TROPONIN: <0.01 NG/ML (ref 0–0.01)
TROPONIN: <0.01 NG/ML (ref 0–0.01)
WBC # BLD: 6.2 K/UL (ref 4.8–10.8)

## 2019-06-04 PROCEDURE — 71275 CT ANGIOGRAPHY CHEST: CPT

## 2019-06-04 PROCEDURE — 71045 X-RAY EXAM CHEST 1 VIEW: CPT

## 2019-06-04 PROCEDURE — 93005 ELECTROCARDIOGRAM TRACING: CPT | Performed by: EMERGENCY MEDICINE

## 2019-06-04 PROCEDURE — 85379 FIBRIN DEGRADATION QUANT: CPT

## 2019-06-04 PROCEDURE — 36415 COLL VENOUS BLD VENIPUNCTURE: CPT

## 2019-06-04 PROCEDURE — 85025 COMPLETE CBC W/AUTO DIFF WBC: CPT

## 2019-06-04 PROCEDURE — 83880 ASSAY OF NATRIURETIC PEPTIDE: CPT

## 2019-06-04 PROCEDURE — 80048 BASIC METABOLIC PNL TOTAL CA: CPT

## 2019-06-04 PROCEDURE — 99285 EMERGENCY DEPT VISIT HI MDM: CPT

## 2019-06-04 PROCEDURE — 6360000004 HC RX CONTRAST MEDICATION: Performed by: EMERGENCY MEDICINE

## 2019-06-04 PROCEDURE — 84484 ASSAY OF TROPONIN QUANT: CPT

## 2019-06-04 RX ORDER — SODIUM CHLORIDE 0.9 % (FLUSH) 0.9 %
3 SYRINGE (ML) INJECTION EVERY 8 HOURS
Status: DISCONTINUED | OUTPATIENT
Start: 2019-06-04 | End: 2019-06-04 | Stop reason: HOSPADM

## 2019-06-04 RX ADMIN — IOPAMIDOL 100 ML: 755 INJECTION, SOLUTION INTRAVENOUS at 04:55

## 2019-06-04 ASSESSMENT — PAIN SCALES - GENERAL
PAINLEVEL_OUTOF10: 2
PAINLEVEL_OUTOF10: 0

## 2019-06-04 ASSESSMENT — ENCOUNTER SYMPTOMS
SINUS PAIN: 0
NAUSEA: 0
VOMITING: 0
SHORTNESS OF BREATH: 0
GASTROINTESTINAL NEGATIVE: 1
COUGH: 1
BLOOD IN STOOL: 0
CHEST TIGHTNESS: 1
BACK PAIN: 0
ABDOMINAL PAIN: 0
DIARRHEA: 0
EYE PAIN: 0
ABDOMINAL DISTENTION: 0
SORE THROAT: 0
EYE DISCHARGE: 0
EYES NEGATIVE: 1
WHEEZING: 1

## 2019-06-04 ASSESSMENT — PAIN DESCRIPTION - FREQUENCY
FREQUENCY: CONTINUOUS
FREQUENCY: CONTINUOUS

## 2019-06-04 ASSESSMENT — PAIN DESCRIPTION - LOCATION
LOCATION: CHEST

## 2019-06-04 ASSESSMENT — PAIN DESCRIPTION - DESCRIPTORS
DESCRIPTORS: ACHING
DESCRIPTORS: ACHING

## 2019-06-04 ASSESSMENT — PAIN DESCRIPTION - PAIN TYPE
TYPE: ACUTE PAIN

## 2019-06-04 ASSESSMENT — PAIN - FUNCTIONAL ASSESSMENT: PAIN_FUNCTIONAL_ASSESSMENT: 0-10

## 2019-06-04 NOTE — ED PROVIDER NOTES
sinus pain and sore throat. Eyes: Negative. Negative for pain and discharge. Respiratory: Positive for cough, chest tightness and wheezing. Negative for shortness of breath. Cardiovascular: Positive for chest pain. Negative for palpitations and leg swelling. Gastrointestinal: Negative. Negative for abdominal distention, abdominal pain, blood in stool, diarrhea, nausea and vomiting. Endocrine: Negative. Negative for polydipsia and polyuria. Genitourinary: Negative. Negative for difficulty urinating, dysuria, flank pain, frequency, hematuria and urgency. Musculoskeletal: Positive for arthralgias and myalgias. Negative for back pain and neck pain. Skin: Negative. Negative for rash and wound. Neurological: Negative. Negative for dizziness, seizures, syncope, weakness and headaches. Hematological: Negative. Negative for adenopathy. Does not bruise/bleed easily. Psychiatric/Behavioral: Negative. Negative for confusion, hallucinations, self-injury and suicidal ideas. All other systems reviewed and are negative. Except as noted above the remainder of the review of systems was reviewed and negative.        PAST MEDICAL HISTORY     Past Medical History:   Diagnosis Date    CAD (coronary artery disease)     Congenital heart disease     Heart attack ([facility] [address] 10/30/2017    Hyperlipidemia     Hypertension     Hypothyroidism     Knee pain     MI (myocardial infarction) ([facility] [address] 2008         SURGICALHISTORY       Past Surgical History:   Procedure Laterality Date    CARDIAC CATHETERIZATION  05/13/16    DR Cal Penaloza    CORONARY ANGIOPLASTY WITH STENT PLACEMENT  2008    VASECTOMY           CURRENT MEDICATIONS       Previous Medications    ALBUTEROL SULFATE  (90 BASE) MCG/ACT INHALER    Inhale 2 puffs into the lungs every 6 hours as needed for Wheezing or Shortness of Breath    AMLODIPINE (NORVASC) 5 MG TABLET        ASCORBIC ACID (VITAMIN C) 500 MG TABLET    Take 500 mg by mouth daily ASPIRIN 81 MG TABLET    Take 81 mg by mouth daily. CARBAMAZEPINE (TEGRETOL) 100 MG CHEWABLE TABLET    chew and swallow 1 tablet by mouth three times a day    CARVEDILOL (COREG) 3.125 MG TABLET        LEVOFLOXACIN (LEVAQUIN) 500 MG TABLET    Take 1 tablet by mouth daily for 10 days    LEVOTHYROXINE (SYNTHROID) 100 MCG TABLET    TAKE 1 TABLET DAILY    MULTIPLE VITAMINS-MINERALS (CENTRUM SILVER ULTRA MENS PO)    Take  by mouth. NITROGLYCERIN (NITROSTAT) 0.4 MG SL TABLET    Place 1 tablet under the tongue every 5 minutes as needed for Chest pain up to max of 3 total doses. If no relief after 1 dose, call 911. POLYETHYLENE GLYCOL (GLYCOLAX) POWDER    Take 17 g by mouth daily as needed (constipation)    PROMETHAZINE-DEXTROMETHORPHAN (PROMETHAZINE-DM) 6.25-15 MG/5ML SYRUP    Take 5 mLs by mouth 4 times daily as needed for Cough    TURMERIC PO    Take by mouth    VITAMIN B-12 (CYANOCOBALAMIN) 1000 MCG TABLET    Take 1,000 mcg by mouth daily. ALLERGIES     Lipitor [atorvastatin]; Celebrex [celecoxib]; Crestor [rosuvastatin]; and Simvastatin    FAMILY HISTORY     History reviewed. No pertinent family history. SOCIAL HISTORY       Social History     Socioeconomic History    Marital status:      Spouse name: None    Number of children: None    Years of education: None    Highest education level: None   Occupational History    None   Social Needs    Financial resource strain: None    Food insecurity:     Worry: None     Inability: None    Transportation needs:     Medical: None     Non-medical: None   Tobacco Use    Smoking status: Former Smoker     Packs/day: 1.50     Years: 20.00     Pack years: 30.00     Types: Cigarettes     Last attempt to quit:      Years since quittin.4    Smokeless tobacco: Never Used   Substance and Sexual Activity    Alcohol use:  Yes     Alcohol/week: 1.2 oz     Types: 1 Glasses of wine, 1 Cans of beer per week     Comment: rarely     Drug use: No    Sexual activity: Yes     Partners: Female   Lifestyle    Physical activity:     Days per week: None     Minutes per session: None    Stress: None   Relationships    Social connections:     Talks on phone: None     Gets together: None     Attends Mu-ism service: None     Active member of club or organization: None     Attends meetings of clubs or organizations: None     Relationship status: None    Intimate partner violence:     Fear of current or ex partner: None     Emotionally abused: None     Physically abused: None     Forced sexual activity: None   Other Topics Concern    None   Social History Narrative    None       SCREENINGS      @FLOW([id number])@      PHYSICAL EXAM    (up to 7 for level 4, 8 or more for level 5)     ED Triage Vitals   BP Temp Temp src Pulse Resp SpO2 Height Weight   06/04/19 0323 -- -- 06/04/19 0318 06/04/19 0318 06/04/19 0318 06/04/19 0318 06/04/19 0318   (!) 173/77   73 17 100 % 6' 4\" (1.93 m) 220 lb (99.8 kg)       Physical Exam   Constitutional: He is oriented to person, place, and time. He appears well-developed and well-nourished. No distress. HENT:   Head: Normocephalic and atraumatic. Right Ear: External ear normal.   Left Ear: External ear normal.   Eyes: Pupils are equal, round, and reactive to light. Conjunctivae and EOM are normal. Right eye exhibits no discharge. Left eye exhibits no discharge. No scleral icterus. Neck: Normal range of motion. Neck supple. No JVD present. No tracheal deviation present. Cardiovascular: Normal rate, regular rhythm, normal heart sounds and intact distal pulses. Exam reveals no friction rub. No murmur heard. Pulmonary/Chest: Effort normal and breath sounds normal. No stridor. No respiratory distress. He has no wheezes. Abdominal: Soft. Bowel sounds are normal. He exhibits no distension. There is no tenderness. There is no guarding. Musculoskeletal: Normal range of motion. He exhibits no edema, tenderness or deformity.

## 2019-06-05 PROCEDURE — 93010 ELECTROCARDIOGRAM REPORT: CPT | Performed by: INTERNAL MEDICINE

## 2019-06-07 LAB
HISTOPLASMA ANTIGEN URINE INTERP: NOT DETECTED
HISTOPLASMA ANTIGEN URINE: NOT DETECTED NG/ML

## 2019-06-10 LAB — HISTOPLASMA ABS, ID: NORMAL

## 2019-06-12 ENCOUNTER — OFFICE VISIT (OUTPATIENT)
Dept: FAMILY MEDICINE CLINIC | Age: 69
End: 2019-06-12
Payer: MEDICARE

## 2019-06-12 VITALS
WEIGHT: 211 LBS | RESPIRATION RATE: 16 BRPM | DIASTOLIC BLOOD PRESSURE: 60 MMHG | HEIGHT: 76 IN | TEMPERATURE: 97.7 F | SYSTOLIC BLOOD PRESSURE: 124 MMHG | BODY MASS INDEX: 25.69 KG/M2 | HEART RATE: 67 BPM | OXYGEN SATURATION: 98 %

## 2019-06-12 DIAGNOSIS — B34.9 VIRAL SYNDROME: Primary | ICD-10-CM

## 2019-06-12 PROCEDURE — 99213 OFFICE O/P EST LOW 20 MIN: CPT | Performed by: FAMILY MEDICINE

## 2019-06-12 ASSESSMENT — ENCOUNTER SYMPTOMS
RESPIRATORY NEGATIVE: 1
EYES NEGATIVE: 1
SHORTNESS OF BREATH: 0
ALLERGIC/IMMUNOLOGIC NEGATIVE: 1
GASTROINTESTINAL NEGATIVE: 1

## 2019-06-12 NOTE — PROGRESS NOTES
Patient is seen in follow up for   Chief Complaint   Patient presents with    Illness     1 week follow up on viral syndrome. Only feeling 25 % better     Hallucinations     Due to tamiflu -      HPIhere for follow up on viral syndrome. feeling some better. Past Medical History:   Diagnosis Date    CAD (coronary artery disease)     Congenital heart disease     [facility]    Hyperlipidemia     Hypertension     Hypothyroidism     Knee pain     MI (myocardial infarction) ([facility]      Patient Active Problem List    Diagnosis Date Noted    ST elevation myocardial infarction involving left anterior descending (LAD) coronary artery (HCC)      Priority: High    CAD (coronary artery disease)     Congenital heart disease     Knee pain      Past Surgical History:   Procedure Laterality Date    CARDIAC CATHETERIZATION  16    DR Cervantes Earing    CORONARY ANGIOPLASTY WITH STENT PLACEMENT  2008    VASECTOMY       No family history on file. Social History     Socioeconomic History    Marital status:      Spouse name: None    Number of children: None    Years of education: None    Highest education level: None   Occupational History    None   Social Needs    Financial resource strain: None    Food insecurity:     Worry: None     Inability: None    Transportation needs:     Medical: None     Non-medical: None   Tobacco Use    Smoking status: Former Smoker     Packs/day: 1.50     Years: 20.00     Pack years: 30.00     Types: Cigarettes     Last attempt to quit: 1982     Years since quittin.4    Smokeless tobacco: Never Used   Substance and Sexual Activity    Alcohol use:  Yes     Alcohol/week: 1.2 oz     Types: 1 Glasses of wine, 1 Cans of beer per week     Comment: rarely     Drug use: No    Sexual activity: Yes     Partners: Female   Lifestyle    Physical activity:     Days per week: None     Minutes per session: None    Stress: None   Relationships    Social connections:     Talks on phone: None     Gets together: None     Attends Restorationism service: None     Active member of club or organization: None     Attends meetings of clubs or organizations: None     Relationship status: None    Intimate partner violence:     Fear of current or ex partner: None     Emotionally abused: None     Physically abused: None     Forced sexual activity: None   Other Topics Concern    None   Social History Narrative    None     Current Outpatient Medications   Medication Sig Dispense Refill    albuterol sulfate  (90 Base) MCG/ACT inhaler Inhale 2 puffs into the lungs every 6 hours as needed for Wheezing or Shortness of Breath 1 Inhaler 0    levofloxacin (LEVAQUIN) 500 MG tablet Take 1 tablet by mouth daily for 10 days 10 tablet 0    amLODIPine (NORVASC) 5 MG tablet       carBAMazepine (TEGRETOL) 100 MG chewable tablet chew and swallow 1 tablet by mouth three times a day  0    carvedilol (COREG) 3.125 MG tablet       polyethylene glycol (GLYCOLAX) powder Take 17 g by mouth daily as needed (constipation) 1530 g 3    TURMERIC PO Take by mouth      levothyroxine (SYNTHROID) 100 MCG tablet TAKE 1 TABLET DAILY 90 tablet 1    nitroGLYCERIN (NITROSTAT) 0.4 MG SL tablet Place 1 tablet under the tongue every 5 minutes as needed for Chest pain up to max of 3 total doses. If no relief after 1 dose, call 911. 25 tablet 3    Ascorbic Acid (VITAMIN C) 500 MG tablet Take 500 mg by mouth daily      Multiple Vitamins-Minerals (CENTRUM SILVER ULTRA MENS PO) Take  by mouth.  aspirin 81 MG tablet Take 81 mg by mouth daily.  vitamin B-12 (CYANOCOBALAMIN) 1000 MCG tablet Take 1,000 mcg by mouth daily. No current facility-administered medications for this visit.       Current Outpatient Medications on File Prior to Visit   Medication Sig Dispense Refill    albuterol sulfate  (90 Base) MCG/ACT inhaler Inhale 2 puffs into the lungs every 6 hours as needed for Wheezing or Shortness of Breath 1 Inhaler 0    levofloxacin (LEVAQUIN) 500 MG tablet Take 1 tablet by mouth daily for 10 days 10 tablet 0    amLODIPine (NORVASC) 5 MG tablet       carBAMazepine (TEGRETOL) 100 MG chewable tablet chew and swallow 1 tablet by mouth three times a day  0    carvedilol (COREG) 3.125 MG tablet       polyethylene glycol (GLYCOLAX) powder Take 17 g by mouth daily as needed (constipation) 1530 g 3    TURMERIC PO Take by mouth      levothyroxine (SYNTHROID) 100 MCG tablet TAKE 1 TABLET DAILY 90 tablet 1    nitroGLYCERIN (NITROSTAT) 0.4 MG SL tablet Place 1 tablet under the tongue every 5 minutes as needed for Chest pain up to max of 3 total doses. If no relief after 1 dose, call 911. 25 tablet 3    Ascorbic Acid (VITAMIN C) 500 MG tablet Take 500 mg by mouth daily      Multiple Vitamins-Minerals (CENTRUM SILVER ULTRA MENS PO) Take  by mouth.  aspirin 81 MG tablet Take 81 mg by mouth daily.  vitamin B-12 (CYANOCOBALAMIN) 1000 MCG tablet Take 1,000 mcg by mouth daily. No current facility-administered medications on file prior to visit. Allergies   Allergen Reactions    Lipitor [Atorvastatin] Other (See Comments)     myalgias    Celebrex [Celecoxib]     Crestor [Rosuvastatin]     Simvastatin     Tamiflu [Oseltamivir] Other (See Comments)     hallucinations     Health Maintenance   Topic Date Due    Shingles Vaccine (2 of 3) 11/27/2017    Diabetes screen  10/31/2020    Lipid screen  09/11/2023    Colon cancer screen colonoscopy  02/18/2025    DTaP/Tdap/Td vaccine (2 - Td) 08/15/2028    Flu vaccine  Completed    Pneumococcal 65+ years Vaccine  Completed    AAA screen  Completed    Hepatitis C screen  Completed       Review of Systems     Review of Systems   Constitutional: Negative for activity change, appetite change, chills, fever and unexpected weight change. HENT: Negative. Eyes: Negative. Respiratory: Negative. Negative for shortness of breath. Cardiovascular: Negative. Negative for chest pain and palpitations. Gastrointestinal: Negative. Endocrine: Negative. Genitourinary: Negative. Musculoskeletal: Negative. Skin: Negative. Allergic/Immunologic: Negative. Neurological: Negative. Hematological: Negative. Psychiatric/Behavioral: Negative. Physical Exam  Vitals:    06/12/19 0812 06/12/19 0817   BP: (!) 120/58 124/60   Site: Left Upper Arm    Position: Sitting    Cuff Size: Large Adult    Pulse: 67    Resp: 16    Temp: 97.7 °F (36.5 °C)    TempSrc: Oral    SpO2: 98%    Weight: 211 lb (95.7 kg)    Height: 6' 4\" (1.93 m)        Physical Exam   Constitutional: He is oriented to person, place, and time. He appears well-developed and well-nourished. HENT:   Right Ear: External ear normal.   Left Ear: External ear normal.   Eyes: Pupils are equal, round, and reactive to light. Conjunctivae and EOM are normal.   Neck: Normal range of motion. Neck supple. No thyromegaly present. Cardiovascular: Normal rate, regular rhythm, normal heart sounds and intact distal pulses. Exam reveals no gallop and no friction rub. No murmur heard. Pulmonary/Chest: Effort normal and breath sounds normal. No respiratory distress. He has no wheezes. Abdominal: Soft. Bowel sounds are normal. He exhibits no distension and no mass. There is no tenderness. There is no rebound and no guarding. No hernia. Genitourinary: Penis normal.   Musculoskeletal: Normal range of motion. He exhibits no edema or tenderness. Lymphadenopathy:     He has no cervical adenopathy. Neurological: He is alert and oriented to person, place, and time. No cranial nerve deficit. Coordination normal.   Skin: Skin is warm and dry. Psychiatric: He has a normal mood and affect. Assessment   Diagnosis Orders   1. Viral syndrome       Problem List     None          Plan  Follow up in 4 weeks. No orders of the defined types were placed in this encounter.     No follow-ups on file.   Nj Valadez MD

## 2019-06-14 ENCOUNTER — PATIENT MESSAGE (OUTPATIENT)
Dept: FAMILY MEDICINE CLINIC | Age: 69
End: 2019-06-14

## 2019-06-14 DIAGNOSIS — G50.0 TIC DOULOUREUX: Primary | ICD-10-CM

## 2019-06-24 NOTE — TELEPHONE ENCOUNTER
Pt unaware how to schedule own appt with [facility]. I did not see a number that Pt can call for [facility]. Please advise.

## 2019-07-10 ENCOUNTER — OFFICE VISIT (OUTPATIENT)
Dept: PULMONOLOGY | Age: 69
End: 2019-07-10
Payer: MEDICARE

## 2019-07-10 VITALS
WEIGHT: 219.4 LBS | HEIGHT: 76 IN | BODY MASS INDEX: 26.72 KG/M2 | HEART RATE: 58 BPM | TEMPERATURE: 95.6 F | RESPIRATION RATE: 16 BRPM | SYSTOLIC BLOOD PRESSURE: 120 MMHG | OXYGEN SATURATION: 98 % | DIASTOLIC BLOOD PRESSURE: 70 MMHG

## 2019-07-10 DIAGNOSIS — R93.89 ABNORMAL CT SCAN, CHEST: Primary | ICD-10-CM

## 2019-07-10 DIAGNOSIS — I51.89 DIASTOLIC DYSFUNCTION: ICD-10-CM

## 2019-07-10 DIAGNOSIS — M25.50 ARTHRALGIA, UNSPECIFIED JOINT: ICD-10-CM

## 2019-07-10 DIAGNOSIS — J84.10 LUNG GRANULOMA (HCC): ICD-10-CM

## 2019-07-10 PROCEDURE — 99203 OFFICE O/P NEW LOW 30 MIN: CPT | Performed by: INTERNAL MEDICINE

## 2019-07-10 NOTE — PROGRESS NOTES
Subjective:     Rei Shrestha is a 77 y.o. male whocomplains today of:     Chief Complaint   Patient presents with    New Patient     Granulomatous Pneumonia       HPI  Patient presents for Abnormal CT chest     He spends winter at [facility], in January of this year he had hemifacial spasm, later in May this year he had   hip and shoulder pain was diagnosed with PMR and treated with steroids. He improved briefly but few days after starting steroid he had flulike illness with fever that lasted for couple days then resolved. He was treated with few courses of antibiotics. Continued to have pain in his chest similar to prior heart attacks and went to ED where he had the CT chest showing the granuloma ,   With pressure and chest pain radiates to his back , feels more like bone pain, during that time he had a constant cough   Is progressively getting better and progressively improving  and he is almost back to his baseline physical activity. Last few days he has some mild cough with clear minimal phlegm   No nasal congestion   Occasional heartburn and sometimes tightness   No history of asthma   Quit cig 1983 smoked 20 years 1.5 pack a day   No history of connective tissue disease, no skin rash no joint stiffness  No family history of connective tissue disease    Allergies:  Lipitor [atorvastatin]; Celebrex [celecoxib]; Crestor [rosuvastatin]; Simvastatin; and Tamiflu [oseltamivir]     Past Medical History:   Diagnosis Date    CAD (coronary artery disease)     Congenital heart disease     Heart attack ([facility] [address] 10/30/2017    Hyperlipidemia     Hypertension     Hypothyroidism     Knee pain     MI (myocardial infarction) ([facility] [address] 2008     Past Surgical History:   Procedure Laterality Date    CARDIAC CATHETERIZATION  05/13/16    DR Rickie Mercer    CORONARY ANGIOPLASTY WITH STENT PLACEMENT  2008    VASECTOMY       History reviewed. No pertinent family history.   Social History     Socioeconomic History    Marital

## 2019-07-17 ENCOUNTER — OFFICE VISIT (OUTPATIENT)
Dept: FAMILY MEDICINE CLINIC | Age: 69
End: 2019-07-17
Payer: MEDICARE

## 2019-07-17 VITALS
HEART RATE: 53 BPM | HEIGHT: 76 IN | DIASTOLIC BLOOD PRESSURE: 68 MMHG | WEIGHT: 219 LBS | SYSTOLIC BLOOD PRESSURE: 122 MMHG | BODY MASS INDEX: 26.67 KG/M2 | OXYGEN SATURATION: 98 %

## 2019-07-17 DIAGNOSIS — E03.9 HYPOTHYROIDISM, UNSPECIFIED TYPE: Primary | ICD-10-CM

## 2019-07-17 PROCEDURE — 99213 OFFICE O/P EST LOW 20 MIN: CPT | Performed by: FAMILY MEDICINE

## 2019-07-17 RX ORDER — TAMSULOSIN HYDROCHLORIDE 0.4 MG/1
0.4 CAPSULE ORAL DAILY
Qty: 30 CAPSULE | Refills: 3 | Status: ON HOLD | OUTPATIENT
Start: 2019-07-17 | End: 2020-09-13

## 2019-07-17 ASSESSMENT — ENCOUNTER SYMPTOMS
GASTROINTESTINAL NEGATIVE: 1
RESPIRATORY NEGATIVE: 1
ALLERGIC/IMMUNOLOGIC NEGATIVE: 1
SHORTNESS OF BREATH: 0
EYES NEGATIVE: 1

## 2019-07-17 NOTE — PROGRESS NOTES
Patient is seen in follow up for   Chief Complaint   Patient presents with    Illness     pt is here for 1 month f/u on viral syndrome, he states he feels much better      HPIHere for follow up doing better. Past Medical History:   Diagnosis Date    CAD (coronary artery disease)     Congenital heart disease     Heart attack ([facility] [address] [address] 10/30/2017    Hyperlipidemia     Hypertension     Hypothyroidism     Knee pain     MI (myocardial infarction) ([facility] [address] [facility]      Patient Active Problem List    Diagnosis Date Noted    ST elevation myocardial infarction involving left anterior descending (LAD) coronary artery (HCC)      Priority: High    CAD (coronary artery disease)     Congenital heart disease     Knee pain      Past Surgical History:   Procedure Laterality Date    CARDIAC CATHETERIZATION  16    DR Pond Fresh    CORONARY ANGIOPLASTY WITH STENT PLACEMENT      VASECTOMY       No family history on file. Social History     Socioeconomic History    Marital status:      Spouse name: Not on file    Number of children: Not on file    Years of education: Not on file    Highest education level: Not on file   Occupational History    Not on file   Social Needs    Financial resource strain: Not on file    Food insecurity:     Worry: Not on file     Inability: Not on file    Transportation needs:     Medical: Not on file     Non-medical: Not on file   Tobacco Use    Smoking status: Former Smoker     Packs/day: 1.50     Years: 20.00     Pack years: 30.00     Types: Cigarettes     Last attempt to quit: 1982     Years since quittin.5    Smokeless tobacco: Never Used   Substance and Sexual Activity    Alcohol use:  Yes     Alcohol/week: 2.0 standard drinks     Types: 1 Glasses of wine, 1 Cans of beer per week     Comment: rarely     Drug use: No    Sexual activity: Yes     Partners: Female   Lifestyle    Physical activity:     Days per week: Not on file     Minutes per session: Not on file    tablet       carBAMazepine (TEGRETOL) 100 MG chewable tablet chew and swallow 1 tablet by mouth three times a day  0    carvedilol (COREG) 3.125 MG tablet       TURMERIC PO Take by mouth      levothyroxine (SYNTHROID) 100 MCG tablet TAKE 1 TABLET DAILY 90 tablet 1    nitroGLYCERIN (NITROSTAT) 0.4 MG SL tablet Place 1 tablet under the tongue every 5 minutes as needed for Chest pain up to max of 3 total doses. If no relief after 1 dose, call 911. 25 tablet 3    Ascorbic Acid (VITAMIN C) 500 MG tablet Take 500 mg by mouth daily      Multiple Vitamins-Minerals (CENTRUM SILVER ULTRA MENS PO) Take  by mouth.  aspirin 81 MG tablet Take 81 mg by mouth daily.  vitamin B-12 (CYANOCOBALAMIN) 1000 MCG tablet Take 1,000 mcg by mouth daily. No current facility-administered medications on file prior to visit. Allergies   Allergen Reactions    Lipitor [Atorvastatin] Other (See Comments)     myalgias    Celebrex [Celecoxib]     Crestor [Rosuvastatin]     Simvastatin     Tamiflu [Oseltamivir] Other (See Comments)     hallucinations     Health Maintenance   Topic Date Due    Annual Wellness Visit (AWV)  08/10/2011    Shingles Vaccine (2 of 3) 11/27/2017    Flu vaccine (1) 09/01/2019    Diabetes screen  10/31/2020    Lipid screen  09/11/2023    Colon cancer screen colonoscopy  02/18/2025    DTaP/Tdap/Td vaccine (2 - Td) 08/15/2028    Pneumococcal 65+ years Vaccine  Completed    AAA screen  Completed    Hepatitis C screen  Completed       Review of Systems     Review of Systems   Constitutional: Negative for activity change, appetite change, chills, fever and unexpected weight change. HENT: Negative. Eyes: Negative. Respiratory: Negative. Negative for shortness of breath. Cardiovascular: Negative. Negative for chest pain and palpitations. Gastrointestinal: Negative. Endocrine: Negative. Genitourinary: Negative. Musculoskeletal: Negative. Skin: Negative.

## 2019-07-24 DIAGNOSIS — E03.9 HYPOTHYROIDISM, UNSPECIFIED TYPE: ICD-10-CM

## 2019-07-24 LAB
T4 FREE: 1.15 NG/DL (ref 0.84–1.68)
TSH SERPL DL<=0.05 MIU/L-ACNC: 2.47 UIU/ML (ref 0.44–3.86)

## 2019-07-31 ENCOUNTER — OFFICE VISIT (OUTPATIENT)
Dept: FAMILY MEDICINE CLINIC | Age: 69
End: 2019-07-31
Payer: MEDICARE

## 2019-07-31 VITALS
TEMPERATURE: 98 F | HEIGHT: 76 IN | WEIGHT: 219 LBS | RESPIRATION RATE: 14 BRPM | HEART RATE: 84 BPM | OXYGEN SATURATION: 99 % | SYSTOLIC BLOOD PRESSURE: 132 MMHG | BODY MASS INDEX: 26.67 KG/M2 | DIASTOLIC BLOOD PRESSURE: 66 MMHG

## 2019-07-31 DIAGNOSIS — R93.89 ABNORMAL CT SCAN, CHEST: ICD-10-CM

## 2019-07-31 DIAGNOSIS — R09.A2 GLOBUS SENSATION: ICD-10-CM

## 2019-07-31 DIAGNOSIS — R09.89 GLOBUS SENSATION: Primary | ICD-10-CM

## 2019-07-31 LAB
ALBUMIN SERPL-MCNC: 4.6 G/DL (ref 3.5–4.6)
ALP BLD-CCNC: 93 U/L (ref 35–104)
ALT SERPL-CCNC: 18 U/L (ref 0–41)
ANION GAP SERPL CALCULATED.3IONS-SCNC: 15 MEQ/L (ref 9–15)
AST SERPL-CCNC: 19 U/L (ref 0–40)
BILIRUB SERPL-MCNC: 0.4 MG/DL (ref 0.2–0.7)
BUN BLDV-MCNC: 13 MG/DL (ref 8–23)
CALCIUM SERPL-MCNC: 9.3 MG/DL (ref 8.5–9.9)
CARBAMAZEPINE LEVEL: 2.1 UG/ML (ref 4–10)
CHLORIDE BLD-SCNC: 91 MEQ/L (ref 95–107)
CO2: 22 MEQ/L (ref 20–31)
CREAT SERPL-MCNC: 0.83 MG/DL (ref 0.7–1.2)
GFR AFRICAN AMERICAN: >60
GFR NON-AFRICAN AMERICAN: >60
GLOBULIN: 2.9 G/DL (ref 2.3–3.5)
GLUCOSE BLD-MCNC: 97 MG/DL (ref 70–99)
POTASSIUM SERPL-SCNC: 4.7 MEQ/L (ref 3.4–4.9)
SODIUM BLD-SCNC: 128 MEQ/L (ref 135–144)
TOTAL CK: 158 U/L (ref 0–190)
TOTAL PROTEIN: 7.5 G/DL (ref 6.3–8)

## 2019-07-31 PROCEDURE — 99213 OFFICE O/P EST LOW 20 MIN: CPT | Performed by: FAMILY MEDICINE

## 2019-07-31 ASSESSMENT — ENCOUNTER SYMPTOMS
RESPIRATORY NEGATIVE: 1
ALLERGIC/IMMUNOLOGIC NEGATIVE: 1
EYES NEGATIVE: 1
SHORTNESS OF BREATH: 0
GASTROINTESTINAL NEGATIVE: 1

## 2019-07-31 NOTE — PROGRESS NOTES
Gastrointestinal: Negative. Endocrine: Negative. Genitourinary: Negative. Musculoskeletal: Negative. Skin: Negative. Allergic/Immunologic: Negative. Neurological: Negative. Hematological: Negative. Psychiatric/Behavioral: Negative. Physical Exam  Vitals:    07/31/19 1422   BP: 132/66   Site: Left Upper Arm   Position: Sitting   Cuff Size: Large Adult   Pulse: 84   Resp: 14   Temp: 98 °F (36.7 °C)   TempSrc: Oral   SpO2: 99%   Weight: 219 lb (99.3 kg)   Height: 6' 4\" (1.93 m)       Physical Exam   Constitutional: He is oriented to person, place, and time. He appears well-developed and well-nourished. HENT:   Right Ear: External ear normal.   Left Ear: External ear normal.   Eyes: Pupils are equal, round, and reactive to light. Conjunctivae and EOM are normal.   Neck: Normal range of motion. Neck supple. No thyromegaly present. Cardiovascular: Normal rate, regular rhythm, normal heart sounds and intact distal pulses. Exam reveals no gallop and no friction rub. No murmur heard. Pulmonary/Chest: Effort normal and breath sounds normal. No respiratory distress. He has no wheezes. Abdominal: Soft. Bowel sounds are normal. He exhibits no distension and no mass. There is no tenderness. There is no rebound and no guarding. No hernia. Genitourinary: Penis normal.   Musculoskeletal: Normal range of motion. He exhibits no edema or tenderness. Lymphadenopathy:     He has no cervical adenopathy. Neurological: He is alert and oriented to person, place, and time. No cranial nerve deficit. Coordination normal.   Skin: Skin is warm and dry. Psychiatric: He has a normal mood and affect. Assessment   Diagnosis Orders   1.  Globus sensation  Amb External Referral To ENT    Comprehensive Metabolic Panel    Carbamazepine Level, Total    CK    Aldolase     Problem List     None          Plan  Orders Placed This Encounter   Procedures    Comprehensive Metabolic Panel     Standing Status:   Future     Number of Occurrences:   1     Standing Expiration Date:   7/30/2020    Carbamazepine Level, Total     Standing Status:   Future     Number of Occurrences:   1     Standing Expiration Date:   7/31/2020     Order Specific Question:   Dose Schedule & Time of Last Dose? Answer:   50 mg bid    CK     Standing Status:   Future     Number of Occurrences:   1     Standing Expiration Date:   7/31/2020    Aldolase     Standing Status:   Future     Number of Occurrences:   1     Standing Expiration Date:   7/31/2020    Amb External Referral To ENT     Referral Priority:   Routine     Referral Type:   Eval and Treat     Referral Reason:   Specialty Services Required     Referred to Provider:   Maira Roa MD     Requested Specialty:   Otolaryngology     Number of Visits Requested:   1     No orders of the defined types were placed in this encounter. leg strength appears normal will observe  No follow-ups on file.   Army Too MD

## 2019-08-01 DIAGNOSIS — E87.1 LOW SODIUM LEVELS: Primary | ICD-10-CM

## 2019-08-02 LAB — ALDOLASE: 2.9 U/L (ref 1.5–8.1)

## 2019-08-08 DIAGNOSIS — E87.1 LOW SODIUM LEVELS: ICD-10-CM

## 2019-08-08 LAB
ALBUMIN SERPL-MCNC: 4.3 G/DL (ref 3.5–4.6)
ALP BLD-CCNC: 92 U/L (ref 35–104)
ALT SERPL-CCNC: 15 U/L (ref 0–41)
ANION GAP SERPL CALCULATED.3IONS-SCNC: 13 MEQ/L (ref 9–15)
AST SERPL-CCNC: 24 U/L (ref 0–40)
BILIRUB SERPL-MCNC: <0.2 MG/DL (ref 0.2–0.7)
BUN BLDV-MCNC: 21 MG/DL (ref 8–23)
CALCIUM SERPL-MCNC: 9 MG/DL (ref 8.5–9.9)
CHLORIDE BLD-SCNC: 98 MEQ/L (ref 95–107)
CO2: 24 MEQ/L (ref 20–31)
CREAT SERPL-MCNC: 1.01 MG/DL (ref 0.7–1.2)
GFR AFRICAN AMERICAN: >60
GFR NON-AFRICAN AMERICAN: >60
GLOBULIN: 2.9 G/DL (ref 2.3–3.5)
GLUCOSE BLD-MCNC: 78 MG/DL (ref 70–99)
POTASSIUM SERPL-SCNC: 4.5 MEQ/L (ref 3.4–4.9)
SODIUM BLD-SCNC: 135 MEQ/L (ref 135–144)
TOTAL PROTEIN: 7.2 G/DL (ref 6.3–8)

## 2019-09-09 ENCOUNTER — HOSPITAL ENCOUNTER (OUTPATIENT)
Dept: PULMONOLOGY | Age: 69
Discharge: HOME | End: 2019-09-09
Payer: MEDICARE

## 2019-09-09 PROCEDURE — 94729 DIFFUSING CAPACITY: CPT | Performed by: INTERNAL MEDICINE

## 2019-09-09 PROCEDURE — 94010 BREATHING CAPACITY TEST: CPT

## 2019-09-09 PROCEDURE — 94010 BREATHING CAPACITY TEST: CPT | Performed by: INTERNAL MEDICINE

## 2019-09-09 PROCEDURE — 94726 PLETHYSMOGRAPHY LUNG VOLUMES: CPT

## 2019-09-09 PROCEDURE — 94726 PLETHYSMOGRAPHY LUNG VOLUMES: CPT | Performed by: INTERNAL MEDICINE

## 2019-09-09 PROCEDURE — 94729 DIFFUSING CAPACITY: CPT

## 2019-09-13 NOTE — PROCEDURES
Complete pulmonary function testing were done on this 68year-old patient who's height is 76 inches and weight is 220 pounds with 28-year smoking history, quit 25 years ago    Indications  Dyspnea and cough    Spirometry  FVC is 4.96 liters which is 91 % of predicted, FEV1 is 3.89 liters which is 97 % of predicted, FEV1/FVC ratio is 78% which is normal  Flow volume loop was unremarkable, FEF 25-75% is 3.37 L/Sec, which is 111 % of predicted  No bronchodilator therapy given    Lung volumes  Done by body plethysmography and showed a total lung capacity of 7.47 liters which is 90 % of predicted, residual volume is 2.74 liters which is 97 % of predicted  RV/TLC ratio is 37 % which is normal     Diffusion capacity  Is 19.13 ml/Min/mmHg which is decreased at 73 % of predicted    Specific airway resistance is normal, specific airway conductance is normal    Overall impression  Mild diffusion impairment was noted otherwise no obstructive or restrictive abnormalities    Electronically signed by Suzan Ramsay MD, FCCP on 9/13/2019 at 2:14 PM

## 2019-09-18 ENCOUNTER — OFFICE VISIT (OUTPATIENT)
Dept: PULMONOLOGY | Age: 69
End: 2019-09-18
Payer: MEDICARE

## 2019-09-18 VITALS
RESPIRATION RATE: 16 BRPM | DIASTOLIC BLOOD PRESSURE: 76 MMHG | HEART RATE: 53 BPM | OXYGEN SATURATION: 98 % | TEMPERATURE: 97.6 F | BODY MASS INDEX: 27.08 KG/M2 | SYSTOLIC BLOOD PRESSURE: 118 MMHG | WEIGHT: 222.4 LBS | HEIGHT: 76 IN

## 2019-09-18 DIAGNOSIS — I51.89 DIASTOLIC DYSFUNCTION: ICD-10-CM

## 2019-09-18 DIAGNOSIS — J84.10 LUNG GRANULOMA (HCC): Primary | ICD-10-CM

## 2019-09-18 PROCEDURE — 99213 OFFICE O/P EST LOW 20 MIN: CPT | Performed by: INTERNAL MEDICINE

## 2019-09-18 NOTE — PROGRESS NOTES
Diastolic dysfunction       Patient symptomatically doing well, no further testing or work-up from pulmonary point of view continue current treatment follow-up with me as needed      No orders of the defined types were placed in this encounter. No orders of the defined types were placed in this encounter. Return if symptoms worsen or fail to improve.       Lidya Harman MD

## 2019-10-13 RX ORDER — LEVOTHYROXINE SODIUM 0.1 MG/1
TABLET ORAL
Qty: 90 TABLET | Refills: 4 | Status: SHIPPED | OUTPATIENT
Start: 2019-10-13 | End: 2020-12-14

## 2019-10-15 ENCOUNTER — TELEPHONE (OUTPATIENT)
Dept: FAMILY MEDICINE CLINIC | Age: 69
End: 2019-10-15

## 2019-10-29 ENCOUNTER — NURSE ONLY (OUTPATIENT)
Dept: FAMILY MEDICINE CLINIC | Age: 69
End: 2019-10-29
Payer: MEDICARE

## 2019-10-29 DIAGNOSIS — Z23 NEED FOR IMMUNIZATION AGAINST INFLUENZA: Primary | ICD-10-CM

## 2019-10-29 PROCEDURE — G0008 ADMIN INFLUENZA VIRUS VAC: HCPCS | Performed by: FAMILY MEDICINE

## 2019-10-29 PROCEDURE — 90653 IIV ADJUVANT VACCINE IM: CPT | Performed by: FAMILY MEDICINE

## 2019-11-06 ENCOUNTER — OFFICE VISIT (OUTPATIENT)
Dept: FAMILY MEDICINE CLINIC | Age: 69
End: 2019-11-06
Payer: MEDICARE

## 2019-11-06 VITALS
OXYGEN SATURATION: 98 % | HEIGHT: 76 IN | HEART RATE: 56 BPM | BODY MASS INDEX: 26.55 KG/M2 | TEMPERATURE: 97.7 F | WEIGHT: 218 LBS | DIASTOLIC BLOOD PRESSURE: 70 MMHG | SYSTOLIC BLOOD PRESSURE: 130 MMHG

## 2019-11-06 DIAGNOSIS — Z00.00 ROUTINE GENERAL MEDICAL EXAMINATION AT A HEALTH CARE FACILITY: Primary | ICD-10-CM

## 2019-11-06 PROCEDURE — G0438 PPPS, INITIAL VISIT: HCPCS | Performed by: FAMILY MEDICINE

## 2019-11-06 ASSESSMENT — PATIENT HEALTH QUESTIONNAIRE - PHQ9
SUM OF ALL RESPONSES TO PHQ QUESTIONS 1-9: 0
SUM OF ALL RESPONSES TO PHQ QUESTIONS 1-9: 0

## 2019-11-06 ASSESSMENT — LIFESTYLE VARIABLES: HOW OFTEN DO YOU HAVE A DRINK CONTAINING ALCOHOL: 0

## 2020-06-19 ENCOUNTER — E-VISIT (OUTPATIENT)
Dept: FAMILY MEDICINE CLINIC | Age: 70
End: 2020-06-19
Payer: MEDICARE

## 2020-06-19 ENCOUNTER — TELEMEDICINE (OUTPATIENT)
Dept: FAMILY MEDICINE CLINIC | Age: 70
End: 2020-06-19
Payer: MEDICARE

## 2020-06-19 PROCEDURE — 99442 PR PHYS/QHP TELEPHONE EVALUATION 11-20 MIN: CPT | Performed by: FAMILY MEDICINE

## 2020-06-19 PROCEDURE — 99421 OL DIG E/M SVC 5-10 MIN: CPT | Performed by: FAMILY MEDICINE

## 2020-06-19 RX ORDER — BACLOFEN 10 MG/1
TABLET ORAL
COMMUNITY
Start: 2020-03-19 | End: 2020-08-11 | Stop reason: DRUGHIGH

## 2020-06-19 ASSESSMENT — ENCOUNTER SYMPTOMS
ALLERGIC/IMMUNOLOGIC NEGATIVE: 1
RESPIRATORY NEGATIVE: 1
GASTROINTESTINAL NEGATIVE: 1
SHORTNESS OF BREATH: 0
EYES NEGATIVE: 1

## 2020-06-19 ASSESSMENT — PATIENT HEALTH QUESTIONNAIRE - PHQ9
SUM OF ALL RESPONSES TO PHQ QUESTIONS 1-9: 0
2. FEELING DOWN, DEPRESSED OR HOPELESS: 0
SUM OF ALL RESPONSES TO PHQ QUESTIONS 1-9: 0
1. LITTLE INTEREST OR PLEASURE IN DOING THINGS: 0
SUM OF ALL RESPONSES TO PHQ9 QUESTIONS 1 & 2: 0

## 2020-06-19 NOTE — PROGRESS NOTES
(See Comments)     hallucinations     Health Maintenance   Topic Date Due    Annual Wellness Visit (AWV)  11/06/2020    Lipid screen  09/11/2023    Colon cancer screen colonoscopy  02/18/2025    DTaP/Tdap/Td vaccine (2 - Td) 08/15/2028    Flu vaccine  Completed    Shingles Vaccine  Completed    Pneumococcal 65+ years Vaccine  Completed    AAA screen  Completed    Hepatitis C screen  Completed    Hepatitis A vaccine  Aged Out    Hepatitis B vaccine  Aged Out    Hib vaccine  Aged Out    Meningococcal (ACWY) vaccine  Aged Out       Review of Systems     Review of Systems   Constitutional: Negative for activity change, appetite change, chills, fever and unexpected weight change. HENT: Negative. Jaw pain for a year and neck ain treated with botox   Eyes: Negative. Respiratory: Negative. Negative for shortness of breath. Cardiovascular: Negative. Negative for chest pain and palpitations. Gastrointestinal: Negative. Endocrine: Negative. Genitourinary: Negative. Musculoskeletal: Positive for myalgias and neck pain. Skin: Negative. Allergic/Immunologic: Negative. Neurological: Negative. Hematological: Negative. Psychiatric/Behavioral: Negative. Physical Exam  There were no vitals filed for this visit. Physical Exam    Assessment   Diagnosis Orders   1. Viral syndrome  Sedimentation Rate   2. Tic douloureux  Sedimentation Rate     Problem List     None          Plan  Orders Placed This Encounter   Procedures    Sedimentation Rate     Standing Status:   Future     Standing Expiration Date:   6/19/2021     No orders of the defined types were placed in this encounter. No follow-ups on file.   Gen Bhakta MD

## 2020-06-19 NOTE — PROGRESS NOTES
I am not aware of any known cause for facial spasm nerve irritation is th most likely cause. Seizure medications some time help.

## 2020-06-22 DIAGNOSIS — B34.9 VIRAL SYNDROME: ICD-10-CM

## 2020-06-22 DIAGNOSIS — G50.0 TIC DOULOUREUX: ICD-10-CM

## 2020-06-22 DIAGNOSIS — E03.9 HYPOTHYROIDISM, UNSPECIFIED TYPE: ICD-10-CM

## 2020-06-22 LAB
SEDIMENTATION RATE, ERYTHROCYTE: 11 MM (ref 0–20)
TSH SERPL DL<=0.05 MIU/L-ACNC: 1.13 UIU/ML (ref 0.44–3.86)

## 2020-06-25 ENCOUNTER — TELEPHONE (OUTPATIENT)
Dept: FAMILY MEDICINE CLINIC | Age: 70
End: 2020-06-25

## 2020-07-27 ENCOUNTER — TELEPHONE (OUTPATIENT)
Dept: FAMILY MEDICINE CLINIC | Age: 70
End: 2020-07-27

## 2020-07-27 NOTE — TELEPHONE ENCOUNTER
Scheduling outreach call to review Health Maintenance and / or schedule appointment. AWV due 11-06-20  Colonoscopy due  [facility] ?   Lab work done  [employer] n/a  PHQ-9 done  Last appointment 06-19-20  Upcoming appointment no  Scanned and updated HM yes

## 2020-08-04 ENCOUNTER — OFFICE VISIT (OUTPATIENT)
Dept: FAMILY MEDICINE CLINIC | Age: 70
End: 2020-08-04
Payer: MEDICARE

## 2020-08-04 VITALS
HEIGHT: 76 IN | OXYGEN SATURATION: 98 % | TEMPERATURE: 97.6 F | SYSTOLIC BLOOD PRESSURE: 154 MMHG | RESPIRATION RATE: 18 BRPM | BODY MASS INDEX: 26.67 KG/M2 | WEIGHT: 219 LBS | HEART RATE: 57 BPM | DIASTOLIC BLOOD PRESSURE: 86 MMHG

## 2020-08-04 PROCEDURE — 99214 OFFICE O/P EST MOD 30 MIN: CPT | Performed by: NURSE PRACTITIONER

## 2020-08-04 NOTE — PROGRESS NOTES
Subjective:     Patient ID: Del Levy is a 65 y.o. male who presentstoday for:  Chief Complaint   Patient presents with    Jaw Pain     Pt. is here with c/o continued jaw pain. Pt. states he has a twitch that goes into his jaw and into his neck.  Neck Pain     Pt. is here with continued neck pain.  Mass     Pt. is here with c/o lumps on the back of his neck X 5 weeks. Pt. c/o itching and pain. Neck Pain    This is a chronic problem. The current episode started more than 1 year ago. The problem occurs daily. The problem has been gradually worsening. The pain is present in the right side. The quality of the pain is described as aching and cramping. The pain is moderate. The symptoms are aggravated by position. Associated symptoms include headaches. Pertinent negatives include no chest pain, fever, leg pain, numbness, pain with swallowing, paresis, photophobia, syncope, tingling, trouble swallowing, visual change, weakness or weight loss. He has tried acetaminophen, ice, muscle relaxants, NSAIDs and home exercises (botox) for the symptoms. The treatment provided mild relief. Past Medical History:   Diagnosis Date    CAD (coronary artery disease)     Congenital heart disease     Heart attack ([facility] [address] 10/30/2017    Hyperlipidemia     Hypertension     Hypothyroidism     Knee pain     MI ([facility] infarction) ([facility] 2008     Current Outpatient Medications on File Prior to Visit   Medication Sig Dispense Refill    levothyroxine (SYNTHROID) 100 MCG tablet TAKE 1 TABLET DAILY 90 tablet 4    tamsulosin (FLOMAX) 0.4 MG capsule Take 1 capsule by mouth daily 30 capsule 3    amLODIPine (NORVASC) 5 MG tablet       carvedilol (COREG) 3.125 MG tablet       nitroGLYCERIN (NITROSTAT) 0.4 MG SL tablet Place 1 tablet under the tongue every 5 minutes as needed for Chest pain up to max of 3 total doses. If no relief after 1 dose, call 911. 25 tablet 3    aspirin 81 MG tablet Take 81 mg by mouth daily.  vitamin B-12 (CYANOCOBALAMIN) 1000 MCG tablet Take 5,000 mcg by mouth daily        No current facility-administered medications on file prior to visit. Past Surgical History:   Procedure Laterality Date    CARDIAC CATHETERIZATION  16    DR Estefania Coello    CORONARY ANGIOPLASTY WITH STENT PLACEMENT  2008    VASECTOMY        No family history on file. Social History     Socioeconomic History    Marital status:      Spouse name: Not on file    Number of children: Not on file    Years of education: Not on file    Highest education level: Not on file   Occupational History    Not on file   Social Needs    Financial resource strain: Not on file    Food insecurity     Worry: Not on file     Inability: Not on file    Transportation needs     Medical: Not on file     Non-medical: Not on file   Tobacco Use    Smoking status: Former Smoker     Packs/day: 1.50     Years: 20.00     Pack years: 30.00     Types: Cigarettes     Last attempt to quit:      Years since quittin.6    Smokeless tobacco: Never Used   Substance and Sexual Activity    Alcohol use:  Yes     Alcohol/week: 2.0 standard drinks     Types: 1 Glasses of wine, 1 Cans of beer per week     Comment: rarely     Drug use: No    Sexual activity: Yes     Partners: Female   Lifestyle    Physical activity     Days per week: Not on file     Minutes per session: Not on file    Stress: Not on file   Relationships    Social connections     Talks on phone: Not on file     Gets together: Not on file     Attends [employer] service: Not on file     Active member of club or organization: Not on file     Attends meetings of clubs or organizations: Not on file     Relationship status: Not on file    Intimate partner violence     Fear of current or ex partner: Not on file     Emotionally abused: Not on file     Physically abused: Not on file     Forced sexual activity: Not on file   Other Topics Concern    Not on file   Social History injury. Thyroid: No thyromegaly. Cardiovascular:      Rate and Rhythm: Normal rate and regular rhythm. Heart sounds: Normal heart sounds. Pulmonary:      Effort: Pulmonary effort is normal.      Breath sounds: Normal breath sounds. Chest:      Chest wall: No tenderness. Abdominal:      General: Bowel sounds are normal. There is no distension. Palpations: Abdomen is soft. Tenderness: There is no abdominal tenderness. Musculoskeletal: Normal range of motion. General: No swelling. Skin:     General: Skin is warm and dry. Findings: No erythema. Neurological:      General: No focal deficit present. Mental Status: He is alert and oriented to person, place, and time. Mental status is at baseline. Cranial Nerves: No cranial nerve deficit. Motor: No weakness. Psychiatric:         Mood and Affect: Mood normal.         Behavior: Behavior normal.         Thought Content: Thought content normal.         Assessment & Plan:       Diagnosis Orders   1. Lump in neck  US HEAD NECK SOFT TISSUE THYROID   2.  Tic douloureux  JASKARAN Zacarias ([facility]    baclofen (LIORESAL) 10 MG tablet     Orders Placed This Encounter   Procedures    US HEAD NECK SOFT TISSUE THYROID     Standing Status:   Future     Standing Expiration Date:   2021     Order Specific Question:   Reason for exam:     Answer:   right sided neck lump    JASKARAN Zacarias ([facility]) Rell Berrios MD, Neurosurgery, [place]     Referral Priority:   Routine     Referral Type:   Eval and Treat     Referral Reason:   Specialty Services Required     Referred to Provider:   Wen Trevino MD     Requested Specialty:   Neurosurgery     Number of Visits Requested:   1     Orders Placed This Encounter   Medications    baclofen (LIORESAL) 10 MG tablet     Si tablet 3 times daily     Dispense:  90 tablet     Refill:  3     Medications Discontinued During This Encounter   Medication Reason   

## 2020-08-11 RX ORDER — BACLOFEN 10 MG/1
10 TABLET ORAL 3 TIMES DAILY
Qty: 90 TABLET | Refills: 3 | Status: ON HOLD
Start: 2020-08-11 | End: 2020-10-06

## 2020-08-11 ASSESSMENT — ENCOUNTER SYMPTOMS
WHEEZING: 0
COUGH: 0
SHORTNESS OF BREATH: 0
SORE THROAT: 0
COLOR CHANGE: 0
DIARRHEA: 0
ABDOMINAL PAIN: 0
VISUAL CHANGE: 0
TROUBLE SWALLOWING: 0
PHOTOPHOBIA: 0
VOMITING: 0
NAUSEA: 0

## 2020-08-20 ENCOUNTER — HOSPITAL ENCOUNTER (OUTPATIENT)
Dept: ULTRASOUND IMAGING | Age: 70
Discharge: HOME | End: 2020-08-22
Payer: MEDICARE

## 2020-08-20 PROCEDURE — 76536 US EXAM OF HEAD AND NECK: CPT

## 2020-09-12 ENCOUNTER — HOSPITAL ENCOUNTER (INPATIENT)
Age: 70
LOS: 1 days | Discharge: HOME | DRG: 247 | End: 2020-09-14
Attending: INTERNAL MEDICINE | Admitting: INTERNAL MEDICINE
Payer: MEDICARE

## 2020-09-12 ENCOUNTER — APPOINTMENT (OUTPATIENT)
Dept: GENERAL RADIOLOGY | Age: 70
DRG: 247 | End: 2020-09-12
Payer: MEDICARE

## 2020-09-12 PROBLEM — I10 HTN (HYPERTENSION): Status: ACTIVE | Noted: 2020-09-12

## 2020-09-12 PROBLEM — R07.9 CHEST PAIN: Status: ACTIVE | Noted: 2020-09-12

## 2020-09-12 PROBLEM — E03.9 HYPOTHYROID: Status: ACTIVE | Noted: 2020-09-12

## 2020-09-12 LAB
ALBUMIN SERPL-MCNC: 4.2 G/DL (ref 3.5–4.6)
ALP BLD-CCNC: 82 U/L (ref 35–104)
ALT SERPL-CCNC: 23 U/L (ref 0–41)
ANION GAP SERPL CALCULATED.3IONS-SCNC: 9 MEQ/L (ref 9–15)
APTT: 26 SEC (ref 24.4–36.8)
AST SERPL-CCNC: 30 U/L (ref 0–40)
BASOPHILS ABSOLUTE: 0 K/UL (ref 0–0.2)
BASOPHILS RELATIVE PERCENT: 0.6 %
BILIRUB SERPL-MCNC: 0.3 MG/DL (ref 0.2–0.7)
BUN BLDV-MCNC: 19 MG/DL (ref 8–23)
CALCIUM SERPL-MCNC: 8.5 MG/DL (ref 8.5–9.9)
CHLORIDE BLD-SCNC: 102 MEQ/L (ref 95–107)
CK MB: 6.2 NG/ML (ref 0–6.7)
CO2: 26 MEQ/L (ref 20–31)
CREAT SERPL-MCNC: 1.08 MG/DL (ref 0.7–1.2)
CREATINE KINASE-MB INDEX: 1.7 % (ref 0–3.5)
EOSINOPHILS ABSOLUTE: 0.3 K/UL (ref 0–0.7)
EOSINOPHILS RELATIVE PERCENT: 3.7 %
GFR AFRICAN AMERICAN: >60
GFR NON-AFRICAN AMERICAN: >60
GLOBULIN: 2.8 G/DL (ref 2.3–3.5)
GLUCOSE BLD-MCNC: 117 MG/DL (ref 70–99)
HCT VFR BLD CALC: 34.7 % (ref 42–52)
HEMOGLOBIN: 11.8 G/DL (ref 14–18)
INR BLD: 1
LYMPHOCYTES ABSOLUTE: 1.6 K/UL (ref 1–4.8)
LYMPHOCYTES RELATIVE PERCENT: 20.6 %
MAGNESIUM: 2.1 MG/DL (ref 1.7–2.4)
MCH RBC QN AUTO: 30.9 PG (ref 27–31.3)
MCHC RBC AUTO-ENTMCNC: 34 % (ref 33–37)
MCV RBC AUTO: 90.9 FL (ref 80–100)
MONOCYTES ABSOLUTE: 0.8 K/UL (ref 0.2–0.8)
MONOCYTES RELATIVE PERCENT: 10.1 %
NEUTROPHILS ABSOLUTE: 5.2 K/UL (ref 1.4–6.5)
NEUTROPHILS RELATIVE PERCENT: 65 %
PDW BLD-RTO: 13 % (ref 11.5–14.5)
PLATELET # BLD: 266 K/UL (ref 130–400)
POTASSIUM SERPL-SCNC: 5.1 MEQ/L (ref 3.4–4.9)
PROTHROMBIN TIME: 13.3 SEC (ref 12.3–14.9)
RBC # BLD: 3.81 M/UL (ref 4.7–6.1)
REASON FOR REJECTION: NORMAL
REJECTED TEST: NORMAL
SODIUM BLD-SCNC: 137 MEQ/L (ref 135–144)
TOTAL CK: 367 U/L (ref 0–190)
TOTAL PROTEIN: 7 G/DL (ref 6.3–8)
TROPONIN: <0.01 NG/ML (ref 0–0.01)
WBC # BLD: 7.9 K/UL (ref 4.8–10.8)

## 2020-09-12 PROCEDURE — 71045 X-RAY EXAM CHEST 1 VIEW: CPT

## 2020-09-12 PROCEDURE — 85025 COMPLETE CBC W/AUTO DIFF WBC: CPT

## 2020-09-12 PROCEDURE — 82550 ASSAY OF CK (CPK): CPT

## 2020-09-12 PROCEDURE — 6370000000 HC RX 637 (ALT 250 FOR IP): Performed by: PHYSICIAN ASSISTANT

## 2020-09-12 PROCEDURE — 36415 COLL VENOUS BLD VENIPUNCTURE: CPT

## 2020-09-12 PROCEDURE — 80053 COMPREHEN METABOLIC PANEL: CPT

## 2020-09-12 PROCEDURE — 83735 ASSAY OF MAGNESIUM: CPT

## 2020-09-12 PROCEDURE — 82553 CREATINE MB FRACTION: CPT

## 2020-09-12 PROCEDURE — 2580000003 HC RX 258: Performed by: NURSE PRACTITIONER

## 2020-09-12 PROCEDURE — 85730 THROMBOPLASTIN TIME PARTIAL: CPT

## 2020-09-12 PROCEDURE — G0378 HOSPITAL OBSERVATION PER HR: HCPCS

## 2020-09-12 PROCEDURE — 84484 ASSAY OF TROPONIN QUANT: CPT

## 2020-09-12 PROCEDURE — 93005 ELECTROCARDIOGRAM TRACING: CPT | Performed by: INTERNAL MEDICINE

## 2020-09-12 PROCEDURE — 85610 PROTHROMBIN TIME: CPT

## 2020-09-12 PROCEDURE — 99285 EMERGENCY DEPT VISIT HI MDM: CPT

## 2020-09-12 RX ORDER — ASPIRIN 81 MG/1
81 TABLET, CHEWABLE ORAL DAILY
Status: DISCONTINUED | OUTPATIENT
Start: 2020-09-13 | End: 2020-09-14 | Stop reason: HOSPADM

## 2020-09-12 RX ORDER — POLYETHYLENE GLYCOL 3350 17 G/17G
17 POWDER, FOR SOLUTION ORAL DAILY PRN
Status: DISCONTINUED | OUTPATIENT
Start: 2020-09-12 | End: 2020-09-14 | Stop reason: HOSPADM

## 2020-09-12 RX ORDER — PROMETHAZINE HYDROCHLORIDE 12.5 MG/1
12.5 TABLET ORAL EVERY 6 HOURS PRN
Status: DISCONTINUED | OUTPATIENT
Start: 2020-09-12 | End: 2020-09-14 | Stop reason: HOSPADM

## 2020-09-12 RX ORDER — ACETAMINOPHEN 325 MG/1
650 TABLET ORAL EVERY 6 HOURS PRN
Status: DISCONTINUED | OUTPATIENT
Start: 2020-09-12 | End: 2020-09-14 | Stop reason: HOSPADM

## 2020-09-12 RX ORDER — ASPIRIN 81 MG/1
324 TABLET, CHEWABLE ORAL ONCE
Status: COMPLETED | OUTPATIENT
Start: 2020-09-12 | End: 2020-09-12

## 2020-09-12 RX ORDER — ACETAMINOPHEN 650 MG/1
650 SUPPOSITORY RECTAL EVERY 6 HOURS PRN
Status: DISCONTINUED | OUTPATIENT
Start: 2020-09-12 | End: 2020-09-14 | Stop reason: HOSPADM

## 2020-09-12 RX ORDER — ONDANSETRON 2 MG/ML
4 INJECTION INTRAMUSCULAR; INTRAVENOUS EVERY 6 HOURS PRN
Status: DISCONTINUED | OUTPATIENT
Start: 2020-09-12 | End: 2020-09-14 | Stop reason: HOSPADM

## 2020-09-12 RX ORDER — SODIUM CHLORIDE 0.9 % (FLUSH) 0.9 %
10 SYRINGE (ML) INJECTION PRN
Status: DISCONTINUED | OUTPATIENT
Start: 2020-09-12 | End: 2020-09-14 | Stop reason: HOSPADM

## 2020-09-12 RX ORDER — NITROGLYCERIN 0.4 MG/1
0.4 TABLET SUBLINGUAL EVERY 5 MIN PRN
Status: DISCONTINUED | OUTPATIENT
Start: 2020-09-12 | End: 2020-09-13 | Stop reason: SDUPTHER

## 2020-09-12 RX ORDER — SODIUM CHLORIDE 0.9 % (FLUSH) 0.9 %
10 SYRINGE (ML) INJECTION EVERY 12 HOURS SCHEDULED
Status: DISCONTINUED | OUTPATIENT
Start: 2020-09-12 | End: 2020-09-14 | Stop reason: HOSPADM

## 2020-09-12 RX ADMIN — Medication 10 ML: at 22:14

## 2020-09-12 RX ADMIN — ASPIRIN 324 MG: 81 TABLET, CHEWABLE ORAL at 19:47

## 2020-09-12 ASSESSMENT — ENCOUNTER SYMPTOMS
RHINORRHEA: 0
ABDOMINAL DISTENTION: 0
VOICE CHANGE: 0
EYE DISCHARGE: 0
BACK PAIN: 1
BACK PAIN: 0
APNEA: 0
NAUSEA: 0
EYES NEGATIVE: 1
SORE THROAT: 0
ANAL BLEEDING: 0
WHEEZING: 0
ABDOMINAL PAIN: 0
PHOTOPHOBIA: 0
VOMITING: 0
COUGH: 0
SHORTNESS OF BREATH: 0

## 2020-09-12 ASSESSMENT — PAIN DESCRIPTION - PAIN TYPE: TYPE: ACUTE PAIN

## 2020-09-12 ASSESSMENT — PAIN DESCRIPTION - DESCRIPTORS: DESCRIPTORS: PRESSURE

## 2020-09-12 ASSESSMENT — PAIN DESCRIPTION - LOCATION: LOCATION: CHEST

## 2020-09-12 ASSESSMENT — PAIN DESCRIPTION - PROGRESSION: CLINICAL_PROGRESSION: GRADUALLY WORSENING

## 2020-09-12 ASSESSMENT — PAIN SCALES - GENERAL
PAINLEVEL_OUTOF10: 8
PAINLEVEL_OUTOF10: 0

## 2020-09-12 ASSESSMENT — PAIN SCALES - WONG BAKER: WONGBAKER_NUMERICALRESPONSE: 2

## 2020-09-12 ASSESSMENT — PAIN DESCRIPTION - FREQUENCY: FREQUENCY: INTERMITTENT

## 2020-09-12 ASSESSMENT — PAIN DESCRIPTION - ORIENTATION: ORIENTATION: MID

## 2020-09-12 ASSESSMENT — PAIN DESCRIPTION - ONSET: ONSET: ON-GOING

## 2020-09-12 ASSESSMENT — PAIN - FUNCTIONAL ASSESSMENT: PAIN_FUNCTIONAL_ASSESSMENT: PREVENTS OR INTERFERES SOME ACTIVE ACTIVITIES AND ADLS

## 2020-09-12 NOTE — ED TRIAGE NOTES
Pt reports that he was at home cutting the grass when he developed 10/10 CP took to nitro pain went away and back pt alert rr even non labored skin wnl speaking clear full sentences

## 2020-09-12 NOTE — ED PROVIDER NOTES
[facility] ICU  eMERGENCY dEPARTMENT eNCOUnter      Pt Name: Sandro Greco  MRN: [id number]  [place] 1950  Date of evaluation: 9/12/2020  Provider: Rika Ibarra Dr       Chief Complaint   Patient presents with    Chest Pain         HISTORY OF PRESENT ILLNESS   (Location/Symptom, Timing/Onset,Context/Setting, Quality, Duration, Modifying Factors, Severity)  Note limiting factors. Sandro Greco is a 65 y.o. male who presents to the emergency department patient's had intermittent chest pain in the past 2 weeks. Today patient had chest pain while mowing lawn he took nitro it improved when walking up steps it recurred nitro improved and again patient was getting out of car hospital and it recurred. He took his 81 mg aspirin today. Does not take any blood thinners he does have chest pain back pain diaphoresis states it feels like the last time he had heart attack. He notes the pain has eased up slightly since arrival in emergency room. Patient denies nausea vomiting denies rectal bleeding denies abdominal pain arm pain neck pain. Symptoms are moderate severity relieved with nitro movement worsens symptoms. HPI    NursingNotes were reviewed. REVIEW OF SYSTEMS    (2-9 systems for level 4, 10 or more for level 5)     Review of Systems   Constitutional: Positive for diaphoresis. Negative for activity change, appetite change, fever and unexpected weight change. HENT: Negative for ear discharge, nosebleeds and voice change. Eyes: Negative for photophobia and discharge. Respiratory: Negative for apnea, cough and shortness of breath. Cardiovascular: Positive for chest pain. Gastrointestinal: Negative for abdominal distention, anal bleeding, nausea and vomiting. Endocrine: Negative for cold intolerance, heat intolerance and polyphagia. Genitourinary: Negative for genital sores. Musculoskeletal: Positive for back pain. Negative for joint swelling and neck pain.    Skin: Negative for pallor. Allergic/Immunologic: Negative for immunocompromised state. Neurological: Negative for seizures and facial asymmetry. Hematological: Does not bruise/bleed easily. Psychiatric/Behavioral: Negative for behavioral problems, self-injury and sleep disturbance. All other systems reviewed and are negative. Except as noted above the remainder of the review of systems was reviewed and negative. PAST MEDICAL HISTORY     Past Medical History:   Diagnosis Date    CAD (coronary artery disease)     Congenital heart disease     Heart attack ([facility] [address] 10/30/2017    Hyperlipidemia     Hypertension     Hypothyroidism     Knee pain     MI (myocardial infarction) ([facility] [address] 2008         SURGICALHISTORY       Past Surgical History:   Procedure Laterality Date    CARDIAC CATHETERIZATION  05/13/16    DR Deejay Oakes    CORONARY ANGIOPLASTY WITH STENT PLACEMENT  2008    VASECTOMY           CURRENT MEDICATIONS       Discharge Medication List as of 9/14/2020  1:16 PM      CONTINUE these medications which have NOT CHANGED    Details   nitroGLYCERIN (NITROSTAT) 0.4 MG SL tablet Place 1 tablet under the tongue every 5 minutes as needed for Chest pain up to max of 3 total doses. If no relief after 1 dose, call 911., Disp-25 tablet, R-3Print      aspirin 81 MG tablet Take 81 mg by mouth daily. baclofen (LIORESAL) 10 MG tablet 1 tablet 3 times daily, Disp-90 tablet,R-3NO PRINT      levothyroxine (SYNTHROID) 100 MCG tablet TAKE 1 TABLET DAILY, Disp-90 tablet, R-4Normal      amLODIPine (NORVASC) 5 MG tablet daily Historical Med      carvedilol (COREG) 3.125 MG tablet 3.125 mg 2 times daily Historical Med      vitamin B-12 (CYANOCOBALAMIN) 1000 MCG tablet Take 1,000 mcg by mouth daily Historical Med             ALLERGIES     Lipitor [atorvastatin]; Celebrex [celecoxib]; Crestor [rosuvastatin]; Simvastatin; and Tamiflu [oseltamivir]    FAMILY HISTORY     History reviewed. No pertinent family history. General: He is not in acute distress. Appearance: He is well-developed. HENT:      Head: Normocephalic and atraumatic. Right Ear: External ear normal.      Left Ear: External ear normal.      Nose: Nose normal.      Mouth/Throat:      Mouth: Mucous membranes are moist.      Pharynx: No oropharyngeal exudate or posterior oropharyngeal erythema. Eyes:      General:         Right eye: No discharge. Left eye: No discharge. Extraocular Movements: Extraocular movements intact. Pupils: Pupils are equal, round, and reactive to light. Neck:      Musculoskeletal: Normal range of motion and neck supple. Cardiovascular:      Rate and Rhythm: Normal rate and regular rhythm. Pulses: Normal pulses. Heart sounds: Normal heart sounds. Pulmonary:      Effort: Pulmonary effort is normal. No respiratory distress. Breath sounds: Normal breath sounds. No stridor. No wheezing, rhonchi or rales. Chest:      Chest wall: No tenderness. Abdominal:      General: Bowel sounds are normal. There is no distension. Palpations: Abdomen is soft. Tenderness: There is no abdominal tenderness. Musculoskeletal: Normal range of motion. Skin:     General: Skin is warm. Findings: No erythema. Neurological:      Mental Status: He is alert and oriented to person, place, and time. Motor: No weakness.    Psychiatric:         Mood and Affect: Mood normal.         DIAGNOSTIC RESULTS     EKG: All EKG's are interpreted by the Emergency Department Physician who either signs or Co-signsthis chart in the absence of a cardiologist.    EKG sinus rhythm negative ST depression noted in V5 V6 rate 68    RADIOLOGY:   Non-plain filmimages such as CT, Ultrasound and MRI are read by the radiologist. Plain radiographic images are visualized and preliminarily interpreted by the emergency physician with the below findings:    See rad report    Interpretation per the Radiologist below, if available at the time ofthis note:    XR CHEST PORTABLE   Final Result   NO ACUTE ACTIVE CARDIOPULMONARY PROCESS            ED BEDSIDE ULTRASOUND:   Performed by ED Physician - none    LABS:  Labs Reviewed   COMPREHENSIVE METABOLIC PANEL - Abnormal; Notable for the following components:       Result Value    Potassium 5.1 (*)     Glucose 117 (*)     All other components within normal limits   CK - Abnormal; Notable for the following components: Total  (*)     All other components within normal limits   CBC WITH AUTO DIFFERENTIAL - Abnormal; Notable for the following components:    RBC 3.81 (*)     Hemoglobin 11.8 (*)     Hematocrit 34.7 (*)     All other components within normal limits    Narrative:     redraw   TROPONIN - Abnormal; Notable for the following components:    Troponin 0.031 (*)     All other components within normal limits    Narrative:     CALL  Ruiz  LC1W tel. [phone],  TROP results called to and read back by Jessi Deng RN, 09/13/2020 02:23,  by Keyon Reyes   TROPONIN - Abnormal; Notable for the following components:    Troponin 0.012 (*)     All other components within normal limits    Narrative:     CALL  Ruiz  LC1W tel. [phone],  Trop results called to and read back by [facility], 09/13/2020 08:34, by  Beau Benitez   CBC - Abnormal; Notable for the following components:    RBC 3.94 (*)     Hemoglobin 12.2 (*)     Hematocrit 35.4 (*)     All other components within normal limits   COMPREHENSIVE METABOLIC PANEL W/ REFLEX TO MG FOR LOW K - Abnormal; Notable for the following components:    Glucose 112 (*)     All other components within normal limits   COMPREHENSIVE METABOLIC PANEL W/ REFLEX TO MG FOR LOW K - Abnormal; Notable for the following components:     Total Protein 6.1 (*)     Total Bilirubin 0.8 (*)     All other components within normal limits   LIPID PANEL - Abnormal; Notable for the following components:    HDL 32 (*)     All other components within normal limits   BASIC METABOLIC PANEL - Bring photo ID, Ins. card, meds and d/c papers, Please wear a mask and come 5 min early,      DISCHARGE MEDICATIONS:  Discharge Medication List as of 9/14/2020  1:16 PM      START taking these medications    Details   ticagrelor (BRILINTA) 90 MG TABS tablet Take 1 tablet by mouth 2 times daily, Disp-60 tablet,R-5Print      rosuvastatin (CRESTOR) 5 MG tablet Take 1 tablet by mouth daily, Disp-30 tablet,R-0Print      coenzyme Q-10 100 MG capsule Take 1 capsule by mouth daily, Disp-30 capsule,R-3Print                (Please note that portions of this note were completed with a voice recognition program.  Efforts were made to edit the dictations but occasionally words are mis-transcribed.)    Lars Krishna PA-C (electronically signed)  Attending Emergency Physician        Lars Krishna PA-C  09/16/20 [id number]

## 2020-09-13 PROBLEM — I20.0 UNSTABLE ANGINA (HCC): Status: ACTIVE | Noted: 2020-09-13

## 2020-09-13 LAB
ALBUMIN SERPL-MCNC: 4 G/DL (ref 3.5–4.6)
ALP BLD-CCNC: 82 U/L (ref 35–104)
ALT SERPL-CCNC: 20 U/L (ref 0–41)
ANION GAP SERPL CALCULATED.3IONS-SCNC: 11 MEQ/L (ref 9–15)
AST SERPL-CCNC: 18 U/L (ref 0–40)
BILIRUB SERPL-MCNC: 0.5 MG/DL (ref 0.2–0.7)
BUN BLDV-MCNC: 16 MG/DL (ref 8–23)
CALCIUM SERPL-MCNC: 8.8 MG/DL (ref 8.5–9.9)
CHLORIDE BLD-SCNC: 106 MEQ/L (ref 95–107)
CO2: 22 MEQ/L (ref 20–31)
CREAT SERPL-MCNC: 0.88 MG/DL (ref 0.7–1.2)
GFR AFRICAN AMERICAN: >60
GFR NON-AFRICAN AMERICAN: >60
GLOBULIN: 2.7 G/DL (ref 2.3–3.5)
GLUCOSE BLD-MCNC: 112 MG/DL (ref 70–99)
HCT VFR BLD CALC: 35.4 % (ref 42–52)
HEMOGLOBIN: 12.2 G/DL (ref 14–18)
LV EF: 50 %
LVEF MODALITY: NORMAL
MCH RBC QN AUTO: 30.9 PG (ref 27–31.3)
MCHC RBC AUTO-ENTMCNC: 34.4 % (ref 33–37)
MCV RBC AUTO: 89.8 FL (ref 80–100)
PDW BLD-RTO: 13 % (ref 11.5–14.5)
PERFORMED ON: NORMAL
PLATELET # BLD: 270 K/UL (ref 130–400)
POC ACTIVATED CLOTTING TIME KAOLIN: 142 SEC (ref 82–152)
POC SAMPLE TYPE: NORMAL
POTASSIUM REFLEX MAGNESIUM: 4.1 MEQ/L (ref 3.4–4.9)
RBC # BLD: 3.94 M/UL (ref 4.7–6.1)
SODIUM BLD-SCNC: 139 MEQ/L (ref 135–144)
TOTAL PROTEIN: 6.7 G/DL (ref 6.3–8)
TROPONIN: 0.01 NG/ML (ref 0–0.01)
TROPONIN: 0.03 NG/ML (ref 0–0.01)
WBC # BLD: 6.4 K/UL (ref 4.8–10.8)

## 2020-09-13 PROCEDURE — 7100000001 HC PACU RECOVERY - ADDTL 15 MIN

## 2020-09-13 PROCEDURE — B2111ZZ FLUOROSCOPY OF MULTIPLE CORONARY ARTERIES USING LOW OSMOLAR CONTRAST: ICD-10-PCS | Performed by: INTERNAL MEDICINE

## 2020-09-13 PROCEDURE — 84484 ASSAY OF TROPONIN QUANT: CPT

## 2020-09-13 PROCEDURE — 2580000003 HC RX 258: Performed by: NURSE PRACTITIONER

## 2020-09-13 PROCEDURE — C9600 PERC DRUG-EL COR STENT SING: HCPCS | Performed by: INTERNAL MEDICINE

## 2020-09-13 PROCEDURE — 2709999900 HC NON-CHARGEABLE SUPPLY

## 2020-09-13 PROCEDURE — B2151ZZ FLUOROSCOPY OF LEFT HEART USING LOW OSMOLAR CONTRAST: ICD-10-PCS | Performed by: INTERNAL MEDICINE

## 2020-09-13 PROCEDURE — 2000000000 HC ICU R&B

## 2020-09-13 PROCEDURE — 6370000000 HC RX 637 (ALT 250 FOR IP): Performed by: NURSE PRACTITIONER

## 2020-09-13 PROCEDURE — 027034Z DILATION OF CORONARY ARTERY, ONE ARTERY WITH DRUG-ELUTING INTRALUMINAL DEVICE, PERCUTANEOUS APPROACH: ICD-10-PCS | Performed by: INTERNAL MEDICINE

## 2020-09-13 PROCEDURE — 85027 COMPLETE CBC AUTOMATED: CPT

## 2020-09-13 PROCEDURE — 6360000002 HC RX W HCPCS: Performed by: INTERNAL MEDICINE

## 2020-09-13 PROCEDURE — C1725 CATH, TRANSLUMIN NON-LASER: HCPCS

## 2020-09-13 PROCEDURE — C1874 STENT, COATED/COV W/DEL SYS: HCPCS

## 2020-09-13 PROCEDURE — 6360000002 HC RX W HCPCS

## 2020-09-13 PROCEDURE — 6360000002 HC RX W HCPCS: Performed by: NURSE PRACTITIONER

## 2020-09-13 PROCEDURE — 85347 COAGULATION TIME ACTIVATED: CPT

## 2020-09-13 PROCEDURE — 2500000003 HC RX 250 WO HCPCS: Performed by: INTERNAL MEDICINE

## 2020-09-13 PROCEDURE — 6370000000 HC RX 637 (ALT 250 FOR IP): Performed by: INTERNAL MEDICINE

## 2020-09-13 PROCEDURE — 93458 L HRT ARTERY/VENTRICLE ANGIO: CPT | Performed by: INTERNAL MEDICINE

## 2020-09-13 PROCEDURE — 93005 ELECTROCARDIOGRAM TRACING: CPT | Performed by: INTERNAL MEDICINE

## 2020-09-13 PROCEDURE — 6370000000 HC RX 637 (ALT 250 FOR IP): Performed by: PHYSICIAN ASSISTANT

## 2020-09-13 PROCEDURE — 92921 PR PRQ TRLUML CORONARY ANGIOPLASTY ADDL BRANCH: CPT | Performed by: INTERNAL MEDICINE

## 2020-09-13 PROCEDURE — C1887 CATHETER, GUIDING: HCPCS

## 2020-09-13 PROCEDURE — 80053 COMPREHEN METABOLIC PANEL: CPT

## 2020-09-13 PROCEDURE — 2580000003 HC RX 258: Performed by: INTERNAL MEDICINE

## 2020-09-13 PROCEDURE — 96372 THER/PROPH/DIAG INJ SC/IM: CPT

## 2020-09-13 PROCEDURE — 6360000004 HC RX CONTRAST MEDICATION: Performed by: INTERNAL MEDICINE

## 2020-09-13 PROCEDURE — 4A023N7 MEASUREMENT OF CARDIAC SAMPLING AND PRESSURE, LEFT HEART, PERCUTANEOUS APPROACH: ICD-10-PCS | Performed by: INTERNAL MEDICINE

## 2020-09-13 PROCEDURE — 93005 ELECTROCARDIOGRAM TRACING: CPT | Performed by: NURSE PRACTITIONER

## 2020-09-13 PROCEDURE — 2580000003 HC RX 258

## 2020-09-13 PROCEDURE — 36415 COLL VENOUS BLD VENIPUNCTURE: CPT

## 2020-09-13 PROCEDURE — 6370000000 HC RX 637 (ALT 250 FOR IP)

## 2020-09-13 PROCEDURE — C1894 INTRO/SHEATH, NON-LASER: HCPCS

## 2020-09-13 PROCEDURE — 7100000000 HC PACU RECOVERY - FIRST 15 MIN

## 2020-09-13 PROCEDURE — 2500000003 HC RX 250 WO HCPCS

## 2020-09-13 PROCEDURE — 92941 PRQ TRLML REVSC TOT OCCL AMI: CPT | Performed by: INTERNAL MEDICINE

## 2020-09-13 PROCEDURE — 92921 HC PRQ CARDIAC ANGIO ADDL ART: CPT | Performed by: INTERNAL MEDICINE

## 2020-09-13 PROCEDURE — C1769 GUIDE WIRE: HCPCS

## 2020-09-13 RX ORDER — SODIUM CHLORIDE 9 MG/ML
INJECTION, SOLUTION INTRAVENOUS CONTINUOUS
Status: DISPENSED | OUTPATIENT
Start: 2020-09-13 | End: 2020-09-14

## 2020-09-13 RX ORDER — RANOLAZINE 500 MG/1
500 TABLET, EXTENDED RELEASE ORAL 2 TIMES DAILY
Status: DISCONTINUED | OUTPATIENT
Start: 2020-09-13 | End: 2020-09-14 | Stop reason: HOSPADM

## 2020-09-13 RX ORDER — HYDRALAZINE HYDROCHLORIDE 20 MG/ML
10 INJECTION INTRAMUSCULAR; INTRAVENOUS EVERY 4 HOURS PRN
Status: DISCONTINUED | OUTPATIENT
Start: 2020-09-13 | End: 2020-09-14 | Stop reason: HOSPADM

## 2020-09-13 RX ORDER — NITROGLYCERIN 0.4 MG/1
0.4 TABLET SUBLINGUAL EVERY 5 MIN PRN
Status: DISCONTINUED | OUTPATIENT
Start: 2020-09-13 | End: 2020-09-14 | Stop reason: HOSPADM

## 2020-09-13 RX ORDER — MORPHINE SULFATE 2 MG/ML
2 INJECTION, SOLUTION INTRAMUSCULAR; INTRAVENOUS EVERY 4 HOURS PRN
Status: DISCONTINUED | OUTPATIENT
Start: 2020-09-13 | End: 2020-09-14 | Stop reason: HOSPADM

## 2020-09-13 RX ORDER — LANOLIN ALCOHOL/MO/W.PET/CERES
400 CREAM (GRAM) TOPICAL DAILY
Status: DISCONTINUED | OUTPATIENT
Start: 2020-09-13 | End: 2020-09-14 | Stop reason: HOSPADM

## 2020-09-13 RX ORDER — CARVEDILOL 3.12 MG/1
3.12 TABLET ORAL 2 TIMES DAILY
Status: DISCONTINUED | OUTPATIENT
Start: 2020-09-13 | End: 2020-09-14 | Stop reason: HOSPADM

## 2020-09-13 RX ORDER — HEPARIN SODIUM 5000 [USP'U]/ML
5000 INJECTION, SOLUTION INTRAVENOUS; SUBCUTANEOUS ONCE
Status: COMPLETED | OUTPATIENT
Start: 2020-09-13 | End: 2020-09-13

## 2020-09-13 RX ORDER — SODIUM CHLORIDE 9 MG/ML
INJECTION, SOLUTION INTRAVENOUS
Status: COMPLETED
Start: 2020-09-13 | End: 2020-09-13

## 2020-09-13 RX ORDER — NITROGLYCERIN 20 MG/100ML
10 INJECTION INTRAVENOUS CONTINUOUS
Status: DISCONTINUED | OUTPATIENT
Start: 2020-09-13 | End: 2020-09-14 | Stop reason: HOSPADM

## 2020-09-13 RX ADMIN — SODIUM CHLORIDE: 9 INJECTION, SOLUTION INTRAVENOUS at 11:40

## 2020-09-13 RX ADMIN — HYDRALAZINE HYDROCHLORIDE 10 MG: 20 INJECTION INTRAMUSCULAR; INTRAVENOUS at 16:19

## 2020-09-13 RX ADMIN — NITROGLYCERIN 0.4 MG: 0.4 TABLET, ORALLY DISINTEGRATING SUBLINGUAL at 10:36

## 2020-09-13 RX ADMIN — MORPHINE SULFATE 2 MG: 2 INJECTION, SOLUTION INTRAMUSCULAR; INTRAVENOUS at 18:09

## 2020-09-13 RX ADMIN — MORPHINE SULFATE 2 MG: 2 INJECTION, SOLUTION INTRAMUSCULAR; INTRAVENOUS at 13:39

## 2020-09-13 RX ADMIN — Medication 400 MG: at 13:38

## 2020-09-13 RX ADMIN — SODIUM CHLORIDE: 9 INJECTION, SOLUTION INTRAVENOUS at 13:39

## 2020-09-13 RX ADMIN — NITROGLYCERIN 10 MCG/MIN: 20 INJECTION INTRAVENOUS at 11:15

## 2020-09-13 RX ADMIN — RANOLAZINE 500 MG: 500 TABLET, FILM COATED, EXTENDED RELEASE ORAL at 13:38

## 2020-09-13 RX ADMIN — NITROGLYCERIN 0.4 MG: 0.4 TABLET, ORALLY DISINTEGRATING SUBLINGUAL at 08:50

## 2020-09-13 RX ADMIN — HEPARIN SODIUM 5000 UNITS: 5000 INJECTION INTRAVENOUS; SUBCUTANEOUS at 11:17

## 2020-09-13 RX ADMIN — RANOLAZINE 500 MG: 500 TABLET, FILM COATED, EXTENDED RELEASE ORAL at 20:13

## 2020-09-13 RX ADMIN — CARVEDILOL 3.12 MG: 3.12 TABLET, FILM COATED ORAL at 13:39

## 2020-09-13 RX ADMIN — Medication 10 ML: at 20:13

## 2020-09-13 RX ADMIN — CARVEDILOL 3.12 MG: 3.12 TABLET, FILM COATED ORAL at 20:13

## 2020-09-13 RX ADMIN — ASPIRIN 81 MG: 81 TABLET, CHEWABLE ORAL at 08:16

## 2020-09-13 RX ADMIN — ONDANSETRON 4 MG: 2 INJECTION INTRAMUSCULAR; INTRAVENOUS at 18:57

## 2020-09-13 RX ADMIN — NITROGLYCERIN 1 INCH: 20 OINTMENT TOPICAL at 08:54

## 2020-09-13 RX ADMIN — ENOXAPARIN SODIUM 40 MG: 40 INJECTION SUBCUTANEOUS at 08:15

## 2020-09-13 RX ADMIN — IOVERSOL 195 ML: 678 INJECTION INTRA-ARTERIAL; INTRAVENOUS at 12:45

## 2020-09-13 ASSESSMENT — PAIN SCALES - WONG BAKER: WONGBAKER_NUMERICALRESPONSE: 2

## 2020-09-13 ASSESSMENT — PAIN SCALES - GENERAL
PAINLEVEL_OUTOF10: 1
PAINLEVEL_OUTOF10: 2
PAINLEVEL_OUTOF10: 0
PAINLEVEL_OUTOF10: 4
PAINLEVEL_OUTOF10: 8
PAINLEVEL_OUTOF10: 0
PAINLEVEL_OUTOF10: 1
PAINLEVEL_OUTOF10: 0
PAINLEVEL_OUTOF10: 0
PAINLEVEL_OUTOF10: 5

## 2020-09-13 ASSESSMENT — PAIN DESCRIPTION - PROGRESSION: CLINICAL_PROGRESSION: GRADUALLY WORSENING

## 2020-09-13 NOTE — H&P
[place] [address] MEDICINE    HISTORY AND PHYSICAL EXAM    PATIENT NAME:  Jimmy David    MRN:  [id number]  SERVICE DATE:  9/12/2020   SERVICE TIME:  9:29 PM    Primary Care Physician: Bhargavi Perez MD         SUBJECTIVE  CHIEF COMPLAINT: Chest pain    HPI:  This is a 65 y.o. male who presents with chest pain while mowing his lawn. Patient rated the pain 8 out of 10 with pressure bilaterally with numbness in his upper extremities bilaterally. Patient states that the pain took his breath away and he became diaphoretic. Patient took 1 nitro and this relieved the the symptoms. However the symptoms returned and he took 1 more nitro and decided to come to the ER. The second nitro relieved the pain as well. Upon arrival to the ER and getting out of the car he felt that his pain was returning then subsided shortly after. Denies shortness of breath, nausea, vomiting, abdominal pain, cough, congestion, or fever. Past medical history includes an MI in 2017. It was an inferior STEMI with drug-eluting stent placed in theRCA. It should be noted that patient is intolerant of statins. Other history includes hypertension, hyperlipidemia, and hypothyroidism. PAST MEDICAL HISTORY:    Past Medical History:   Diagnosis Date    CAD (coronary artery disease)     Congenital heart disease     Heart attack ([facility] [address] 10/30/2017    Hyperlipidemia     Hypertension     Hypothyroidism     Knee pain     MI (myocardial infarction) ([facility] [address] 2008     PAST SURGICAL HISTORY:    Past Surgical History:   Procedure Laterality Date    CARDIAC CATHETERIZATION  05/13/16    DR Navarro Sessions    CORONARY ANGIOPLASTY WITH STENT PLACEMENT  2008    VASECTOMY       FAMILY HISTORY:  History reviewed. No pertinent family history.   SOCIAL HISTORY:    Social History     Socioeconomic History    Marital status:      Spouse name: Not on file    Number of children: Not on file    Years of education: Not on file    Highest education level: Not on file   Occupational History    Not on file   Social Needs    Financial resource strain: Not on file    Food insecurity     Worry: Not on file     Inability: Not on file    Transportation needs     Medical: Not on file     Non-medical: Not on file   Tobacco Use    Smoking status: Former Smoker     Packs/day: 1.50     Years: 20.00     Pack years: 30.00     Types: Cigarettes     Last attempt to quit:      Years since quittin.7    Smokeless tobacco: Never Used   Substance and Sexual Activity    Alcohol use: Yes     Alcohol/week: 2.0 standard drinks     Types: 1 Glasses of wine, 1 Cans of beer per week     Comment: rarely     Drug use: No    Sexual activity: Yes     Partners: Female   Lifestyle    Physical activity     Days per week: Not on file     Minutes per session: Not on file    Stress: Not on file   Relationships    Social connections     Talks on phone: Not on file     Gets together: Not on file     Attends Spiritism service: Not on file     Active member of club or organization: Not on file     Attends meetings of clubs or organizations: Not on file     Relationship status: Not on file    Intimate partner violence     Fear of current or ex partner: Not on file     Emotionally abused: Not on file     Physically abused: Not on file     Forced sexual activity: Not on file   Other Topics Concern    Not on file   Social History Narrative    Not on file     MEDICATIONS:   Prior to Admission medications    Medication Sig Start Date End Date Taking?  Authorizing Provider   baclofen (LIORESAL) 10 MG tablet 1 tablet 3 times daily 20   HERBER Perez CNP   levothyroxine (SYNTHROID) 100 MCG tablet TAKE 1 TABLET DAILY 10/13/19   HERBER Perez CNP   tamsulosin [facility]) 0.4 MG capsule Take 1 capsule by mouth daily 19  Dayne Beckford MD   amLODIPine (NORVASC) 5 MG tablet  19   Historical Provider, MD   carvedilol (COREG) 3.125 MG tablet  19   Historical Provider, MD   nitroGLYCERIN (NITROSTAT) 0.4 MG SL tablet Place 1 tablet under the tongue every 5 minutes as needed for Chest pain up to max of 3 total doses. If no relief after 1 dose, call 911. 11/1/17   Alcon Faustin MD   aspirin 81 MG tablet Take 81 mg by mouth daily. Historical Provider, MD   vitamin B-12 (CYANOCOBALAMIN) 1000 MCG tablet Take 5,000 mcg by mouth daily     Historical Provider, MD       ALLERGIES: Lipitor [atorvastatin]; Celebrex [celecoxib]; Crestor [rosuvastatin]; Simvastatin; and Tamiflu [oseltamivir]    REVIEW OF SYSTEM:   Review of Systems   Constitutional: Negative for chills, fatigue and fever. HENT: Negative for congestion, ear pain, rhinorrhea and sore throat. Eyes: Negative. Respiratory: Negative for cough, shortness of breath and wheezing. Cardiovascular: Positive for chest pain (Not currently). Negative for leg swelling. Gastrointestinal: Negative for abdominal pain, nausea and vomiting. Endocrine: Negative. Genitourinary: Negative for dysuria and flank pain. Musculoskeletal: Negative for back pain. Skin: Negative. Neurological: Negative for headaches. Psychiatric/Behavioral: Negative for agitation and behavioral problems. OBJECTIVE  PHYSICAL EXAM: BP (!) 177/74   Pulse 74   Temp 97.9 °F (36.6 °C) (Oral)   Resp 18   Ht 6' (1.829 m)   Wt 220 lb (99.8 kg)   SpO2 100%   BMI 29.84 kg/m²     Physical Exam  Vitals signs and nursing note reviewed. Constitutional:       Appearance: He is well-developed. HENT:      Right Ear: External ear normal.      Left Ear: External ear normal.      Nose: Nose normal.   Eyes:      Pupils: Pupils are equal, round, and reactive to light. Neck:      Musculoskeletal: Normal range of motion. Cardiovascular:      Rate and Rhythm: Normal rate and regular rhythm. Pulses: Normal pulses. Heart sounds: Normal heart sounds.    Pulmonary:      Effort: Pulmonary effort is normal. No respiratory distress. Breath sounds: Normal breath sounds. No wheezing. Abdominal:      General: Bowel sounds are normal.      Palpations: Abdomen is soft. There is no mass. Tenderness: There is no abdominal tenderness. Musculoskeletal: Normal range of motion. Right lower leg: No edema. Left lower leg: No edema. Lymphadenopathy:      Cervical: No cervical adenopathy. Skin:     General: Skin is warm and dry. Capillary Refill: Capillary refill takes less than 2 seconds. Neurological:      Mental Status: He is alert and oriented to person, place, and time. Deep Tendon Reflexes: Reflexes normal.   Psychiatric:         Thought Content: Thought content normal.           DATA:     Diagnostic tests reviewed for today's visit:    Most recent labs and imaging results reviewed.      LABS:    Recent Results (from the past 24 hour(s))   Comprehensive Metabolic Panel    Collection Time: 09/12/20  7:42 PM   Result Value Ref Range    Sodium 137 135 - 144 mEq/L    Potassium 5.1 (H) 3.4 - 4.9 mEq/L    Chloride 102 95 - 107 mEq/L    CO2 26 20 - 31 mEq/L    Anion Gap 9 9 - 15 mEq/L    Glucose 117 (H) 70 - 99 mg/dL    BUN 19 8 - 23 mg/dL    CREATININE 1.08 0.70 - 1.20 mg/dL    GFR Non-African American >60.0 >60    GFR  >60.0 >60    Calcium 8.5 8.5 - 9.9 mg/dL    Total Protein 7.0 6.3 - 8.0 g/dL    Alb 4.2 3.5 - 4.6 g/dL    Total Bilirubin 0.3 0.2 - 0.7 mg/dL    Alkaline Phosphatase 82 35 - 104 U/L    ALT 23 0 - 41 U/L    AST 30 0 - 40 U/L    Globulin 2.8 2.3 - 3.5 g/dL   Magnesium    Collection Time: 09/12/20  7:42 PM   Result Value Ref Range    Magnesium 2.1 1.7 - 2.4 mg/dL   Troponin    Collection Time: 09/12/20  7:42 PM   Result Value Ref Range    Troponin <0.010 0.000 - 0.010 ng/mL   CK    Collection Time: 09/12/20  7:42 PM   Result Value Ref Range    Total  (H) 0 - 190 U/L   CKMB & RELATIVE PERCENT    Collection Time: 09/12/20  7:42 PM   Result Value Ref Range    CK-MB 6.2 0.0 - 6.7

## 2020-09-13 NOTE — PROGRESS NOTES
Patient put call light on. His nurse was off the floor and no one was available immediately to respond so patient took his own nitro from his pocket. When this nurse arrived his pain had gone from an 8/10 to a 2/10. Pain radiated from chest to his back. /70, HR 67 O2 100% on 2L. Declined a second nitro, states he is feeling much better. Spoke to patient and advise him to not take any of his home medication while he was in the hospital, he was understanding.

## 2020-09-13 NOTE — PROGRESS NOTES
1300-patient arrived from cath lab was a code purple from [id number] Patient has a sheath in right femoral artery. bp elevated- bp oral meds given. Patient complains of 5/10 pain in chest- morphine given per  order. - notified dr singleton act is 80 but patient is still having 2/10 CP he stated to pull sheath. 65- notified dr singleton that the patient is having elevated 's/80's  1630- hydralazine ordered and given to patient   1700- pulled patients sheath and held pressure for 15 min patient tolerated well and had no hematoma, redness or bleeding  1800- patients site still has no hematoma, redness or bleeding.

## 2020-09-13 NOTE — PROGRESS NOTES
Hospitalist Daily Progress Note  Name: Negra Bess  Age: 65 y.o. Gender: male  CodeStatus: Full Code  Allergies: Lipitor [Atorvastatin]  Celebrex [Celecoxib]  Crestor [Rosuvastatin]  Simvastatin  Tamiflu [Oseltamivir]    Chief Complaint:Chest Pain      Primary Care Provider: Nemo Brooke MD    InpatientTreatment Team: Treatment Team: Attending Provider: Zeferino Anders MD; Consulting Physician: David Chan MD; Utilization Reviewer: Margarita Smith RN; Registered Nurse: Mike Kang RN    Admission Date: 9/12/2020      Subjective: Chest pain this am, substernal to back, no sob. Physical Exam  Vitals signs and nursing note reviewed. Constitutional:       Appearance: Normal appearance. Cardiovascular:      Rate and Rhythm: Normal rate and regular rhythm. Pulmonary:      Effort: Pulmonary effort is normal.      Breath sounds: Normal breath sounds. Abdominal:      General: Bowel sounds are normal.      Palpations: Abdomen is soft. Musculoskeletal: Normal range of motion. Skin:     General: Skin is warm and dry. Neurological:      Mental Status: He is alert and oriented to person, place, and time. Mental status is at baseline.          Medications:  Reviewed    Infusion Medications:    nitroGLYCERIN 10 mcg/min (09/13/20 1115)    sodium chloride       Scheduled Medications:    magnesium oxide  400 mg Oral Daily    [START ON 9/14/2020] ticagrelor  90 mg Oral BID    ranolazine  500 mg Oral BID    carvedilol  3.125 mg Oral BID    sodium chloride flush  10 mL Intravenous 2 times per day    aspirin  81 mg Oral Daily    enoxaparin  40 mg Subcutaneous Daily     PRN Meds: iopamidol, morphine, nitroGLYCERIN, sodium chloride flush, acetaminophen **OR** acetaminophen, polyethylene glycol, promethazine **OR** ondansetron    Labs:   Recent Labs     09/12/20 2006 09/13/20  0743   WBC 7.9 6.4   HGB 11.8* 12.2*   HCT 34.7* 35.4*    270     Recent Labs     09/12/20 1942 09/13/20  0743  139   K 5.1* 4.1    106   CO2 26 22   BUN 19 16   CREATININE 1.08 0.88   CALCIUM 8.5 8.8     Recent Labs     09/12/20  1942 09/13/20  0743   AST 30 18   ALT 23 20   BILITOT 0.3 0.5   ALKPHOS 82 82     Recent Labs     09/12/20  1943   INR 1.0     Recent Labs     09/12/20  1942 09/13/20  0149 09/13/20  0743   CKTOTAL 367*  --   --    TROPONINI <0.010 0.031* 0.012*       Urinalysis:   Lab Results   Component Value Date    NITRU Negative 10/29/2017    BLOODU +- 05/16/2019    BLOODU Negative 10/29/2017    SPECGRAV 1.020 05/16/2019    SPECGRAV 1.008 10/29/2017    GLUCOSEU - 05/16/2019    GLUCOSEU Negative 10/29/2017       Radiology:   Most recent    Chest CT      WITH CONTRAST:No results found for this or any previous visit. WITHOUT CONTRAST: No results found for this or any previous visit. CXR      2-view:   Results for orders placed in visit on 06/03/19   XR CHEST STANDARD (2 VW)    Narrative DIAGNOSIS:J20.9 Acute bronchitis, unspecified organism ICD10    COMPARISON: May 3, 2019; May 17, 2019    TECHNIQUE: PA and lateral chest    FINDINGS: The lungs are hyperinflated. There is a opacity seen laterally in the right upper lobe adjacent to the inferior aspect of the scapula. . The cardiac silhouette is not enlarged. Degenerative changes are seen in the dorsal spine. Impression  IMPRESSION: Vague opacity seen in the lateral aspect of the right upper lobe could reflect infiltrate. Recommend follow-up. Portable:   Results for orders placed during the hospital encounter of 09/12/20   XR CHEST PORTABLE    Narrative EXAMINATION: CHEST PORTABLE VIEW     CLINICAL HISTORY: Midsternal chest pain    COMPARISONS: January 4, 2019     FINDINGS:    2 views of the chest is submitted. The cardiac silhouette is enlarged. Pulmonary vascular unremarkable. Right sided trachea. No focal infiltrates. No Pneumothoraces. Impression NO ACUTE ACTIVE CARDIOPULMONARY PROCESS       Echo No results found for this or any previous visit.           Assessment/Plan:    Active Hospital Problems    Diagnosis Date Noted    Unstable angina ([facility] [address] [I20.0] 09/13/2020    Chest pain [R07.9] 09/12/2020    Hypothyroid [E03.9] 09/12/2020    HTN (hypertension) [I10] 09/12/2020    CAD (coronary artery disease) [I25.10]      1 - NSTEMI s/p PCI LAD    DAPT, nitro gtt, icu transfer post procedure    2 - Hyperkalemia    3 - HTN    4 - DVT proph    Electronically signed by Scot Schultz MD on 9/13/2020 at 1:37 PM

## 2020-09-13 NOTE — CONSULTS
See full dictation on some but September 13    Assessment:  Unstable angina  Subtle ST changes in the lateral leads  Mildly elevated troponins  Symptoms similar to what he had in the past when he underwent angioplasty and a stent to the right coronary artery in 2017  Hyperlipidemia-unable to be treated  Hypertension  Plan:  IV heparin  Nitroglycerin drip  Discussed with interventional team-patient was taken to the heart catheterization lab in an urgent basis for coronary angiography and possible intervention. The risks of procedure including rare death, stroke, myocardial infarction, bleeding, contrast media nephropathy, and arterial damage discussed the patient the patient's wife.   Check lipids  Check hemoglobin A1c  Transferred intensive care till interventional team could be brought in   Dr. Felipe Dominguez

## 2020-09-13 NOTE — CONSULTS
[address]                      [facility], [address] [facility]                                  CONSULTATION    PATIENT NAME: Edi Angulo                    :        1950  MED REC NO:   [id number]                            ROOM:       [facility]  ACCOUNT NO:   [de-identified]                           ADMIT DATE: 2020  PROVIDER:     David Chan MD    CONSULT DATE:  2020    REASON FOR CONSULTATION:  We were consulted for exertional chest  discomfort on the patient. HISTORY OF PRESENT ILLNESS:  This is a very pleasant 59-year-old  gentleman, who was noted over the last couple of weeks and more acutely,  the patient having exertional type of chest pain. He describes this as  burning sensation going into his back. There is some little bit of  shortness of breath and diaphoresis as well. The patient took a  sublingual nitroglycerin after mowing a lawn and then improved; however,  it recurred during the evening, and presented to the emergency room late  last night or early this morning. The patient, in general, was brought  in for further evaluation. His initial troponins were negative;  however, the second did creep up to 0.031. The patient had intermittent  chest discomfort during the night, and all were relieved with  nitroglycerin. The patient does have a history of significant coronary disease 2008 in  [place], the patient underwent angioplasty and stent to right coronary  artery. The patient had a subsequent heart catheterization by Dr. Lisa Ogden  in 2016, but the patient presented to the emergency room for further  evaluation in 2017 and on an acute basis had severe stenosis of the  right coronary artery. This was done by Dr. Ruiz Phillips, who was on-call. The patient in general, presently even while I am interviewing has  intermittent chest discomfort, it is relieved with nitroglycerin.    However, we decided given his unstable symptoms, the fact that he has  elevated troponins, as well as some very subtle ST changes in the  lateral lead that we would consider heart catheterization. His monitor,  which is not very reliable, did show some T-wave changes. Other past medical history is notable for the following. The patient  has above-mentioned coronary disease, stenting on two different  occasions. He has had a myocardial infarction remotely. He has some  knee pain, hypothyroidism, hypertension, hyperlipidemia, there is some  statement about congenital heart disease although it is unclear, our  records do not show any obvious evidence of congenital heart disease. Reviewing Dr. Abraham Backbone note, there is no documented congenital heart  disease. He also has some statin intolerance. There is no lipid panel on the  chart. He has been tried on Crestor, simvastatin, and Zetia. Other past medical history is notable for the following. PAST SURGICAL HISTORY:  Vasectomy, the above-mentioned angioplasty and  stent x2. FAMILY HISTORY:  Notable for a history of cardiac disease. SOCIAL HISTORY:  He used to smoke, but quit a number of years ago in  1982. He occasionally uses some alcohol. Denies any drug abuse. He is   and lives with his wife. MEDICATIONS:  His medications according to [facility] upon admission are baby  aspirin, amlodipine, B12, carvedilol 3.125 mg p.o. b.i.d., vitamins,  levothyroxine, nitroglycerin p.r.n. and vitamin C. According to his  chart here, his medicines at home outside of the [facility] are  essentially the same. He also takes Flomax 0.4 mg a day, Synthroid 100  mcg a day _____ aspirin, Norvasc and carvedilol. He also takes some  B12. Discussion about having started on Praluent or [address] [place] [address] had also  been made in the past per primary cardiologist, Dr. Olamide Varela. ALLERGIES:  The patient's allergies are notable for the above-mentioned  Billey Sessions and apparently some TAMIFLU.     REVIEW OF SYSTEMS:  Other 12-point review of systems otherwise negative  except outlined in the history of present illness. PHYSICAL EXAMINATION:  HEENT:  Atraumatic, normocephalic. No xanthelasma is noted. He does  have a scar on his forehead. No icterus. NECK:  There is bilateral carotid sounds, possible bruits, a little  louder on right than left. No visible thyromegaly. He has no obvious  jugular venous distention. CHEST:  AP of chest is normal.  Lungs are clear to auscultation without  rhonchi, rales or wheeze. CARDIOVASCULAR:  He has got regular rate and rhythm on exam.  PMI is not  laterally nondisplaced. No obvious murmur, S3 or S4 is noted. No rub  is noted. ABDOMEN:  Mildly overweight without any peritoneal signs. EXTREMITIES:  Upper extremities both symmetrical.  He has got bilateral  femoral pulsations with faint bruits, he has got good distal pulses. No  edema. No calf tenderness elicited. LABORATORY DATA:  Other laboratory values are notable for the following. He has a mildly elevated glucose today. BUN and creatinine are normal.   His CBC shows some mild anemia, hemoglobin 12.2. His protime and INR is  normal.  His magnesium is 2.1. His potassium was trivially elevated  upon presentation. Glucose is trivially elevated as well at 117. Thyroid value tested in 06/2020 is normal.    ASSESSMENT:  1. The patient with unstable anginal symptoms. 2.  Subtle ST changes in the lateral lead, but no ST elevation. 3.  History of coronary disease. 4.  Borderline elevated troponins. 5.  Classic history of unstable angina. PLAN:  In general, I have discussed with our interventional team about  acute intervention. We will give him heparin, start him on  nitroglycerin, his heart rate is already 60, so no beta-blocker will be  given. They are calling the cath lab team to do a heart catheterization  and possible intervention.         Tamy Kwon MD    D: 09/13/2020 11:14:17       T: 09/13/2020 11:19:33 [email or web address]  Job#: [id number]     Doc#: [id number]    CC:

## 2020-09-13 NOTE — ACP (ADVANCE CARE PLANNING)
Would the patient desire the use of ventilator (breathing machine)?: yes    \"If your health worsens and it becomes clear that your chance of recovery is unlikely, what would your preference be about the use of a ventilator (breathing machine) if it were available to you? \"     Would the patient desire the use of ventilator (breathing machine)?: No      Resuscitation  \"CPR works best to restart the heart when there is a sudden event, like a heart attack, in someone who is otherwise healthy. Unfortunately, CPR does not typically restart the heart for people who have serious health conditions or who are very sick. \"    \"In the event your heart stopped as a result of an underlying serious health condition, would you want attempts to be made to restart your heart (answer \"yes\" for attempt to resuscitate) or would you prefer a natural death (answer \"no\" for do not attempt to resuscitate)? \" yes    Pt response to whether he would want cpr if his condition were to worsen he states \"no\"    NOTE: If the patient has a valid advance directive AND now provides care preference(s) that are inconsistent with that prior directive, advise the patient to consider either: creating a new advance directive that complies with state-specific requirements; or, if that is not possible, orally revoking that prior directive in accordance with state-specific requirements, which must be documented in the EHR. [x] Yes   [] No   Educated Patient / Rg Jamil regarding differences between Advance Directives and portable DNR orders. Pt is given a copy of the living will and instructed about it. Pt's wife is present and did hear pt's preferences.     Length of ACP Conversation in minutes:  15    Conversation Outcomes:  [x] ACP discussion completed  [] Existing advance directive reviewed with patient; no changes to patient's previously recorded wishes  [] New Advance Directive completed  [] Portable Do Not Rescitate prepared for Provider review and signature  [] POLST/POST/MOLST/MOST prepared for Provider review and signature      Follow-up plan:    [] Schedule follow-up conversation to continue planning  [] Referred individual to Provider for additional questions/concerns   [] Advised patient/agent/surrogate to review completed ACP document and update if needed with changes in condition, patient preferences or care setting    [x] This note routed to one or more involved healthcare providers

## 2020-09-13 NOTE — FLOWSHEET NOTE
9884 pt given all meds. Home meds reviewed with patient and completed. Message sent to Dr Vela to order. Pt refused nitropaste. 0850 Pt was up to BR for BM. When he returned to bed he started experiencing chest pain 9/10. Gave 1 sublingual nitro and obtained VS. Pt agreed to nitropaste at this time. 1036 pt complaining of chest pain 10/10 medicated with 1 sublingual nitro Dr Alana Epley in to see pt. Discussing taking pt to cath lab today. 200 Dr Alana Epley discussed with Dr Nadia Durham, code purple called.  Taking pt to cath lab today    1140 pt to cath lab

## 2020-09-14 VITALS
RESPIRATION RATE: 21 BRPM | HEIGHT: 76 IN | BODY MASS INDEX: 26.09 KG/M2 | DIASTOLIC BLOOD PRESSURE: 60 MMHG | SYSTOLIC BLOOD PRESSURE: 128 MMHG | OXYGEN SATURATION: 97 % | HEART RATE: 61 BPM | TEMPERATURE: 98.5 F | WEIGHT: 214.29 LBS

## 2020-09-14 PROBLEM — Z95.5 STENTED CORONARY ARTERY: Status: ACTIVE | Noted: 2020-09-14

## 2020-09-14 LAB
ALBUMIN SERPL-MCNC: 3.8 G/DL (ref 3.5–4.6)
ALP BLD-CCNC: 77 U/L (ref 35–104)
ALT SERPL-CCNC: 19 U/L (ref 0–41)
ANION GAP SERPL CALCULATED.3IONS-SCNC: 6 MEQ/L (ref 9–15)
AST SERPL-CCNC: 36 U/L (ref 0–40)
BILIRUB SERPL-MCNC: 0.8 MG/DL (ref 0.2–0.7)
BUN BLDV-MCNC: 11 MG/DL (ref 8–23)
CALCIUM SERPL-MCNC: 8.5 MG/DL (ref 8.5–9.9)
CHLORIDE BLD-SCNC: 103 MEQ/L (ref 95–107)
CHOLESTEROL, TOTAL: 156 MG/DL (ref 0–199)
CO2: 26 MEQ/L (ref 20–31)
CREAT SERPL-MCNC: 0.82 MG/DL (ref 0.7–1.2)
EKG ATRIAL RATE: 57 BPM
EKG ATRIAL RATE: 59 BPM
EKG ATRIAL RATE: 60 BPM
EKG ATRIAL RATE: 68 BPM
EKG ATRIAL RATE: 70 BPM
EKG P AXIS: -2 DEGREES
EKG P AXIS: 12 DEGREES
EKG P AXIS: 14 DEGREES
EKG P AXIS: 18 DEGREES
EKG P AXIS: 36 DEGREES
EKG P-R INTERVAL: 196 MS
EKG P-R INTERVAL: 204 MS
EKG P-R INTERVAL: 204 MS
EKG P-R INTERVAL: 208 MS
EKG P-R INTERVAL: 214 MS
EKG Q-T INTERVAL: 388 MS
EKG Q-T INTERVAL: 406 MS
EKG Q-T INTERVAL: 422 MS
EKG Q-T INTERVAL: 430 MS
EKG Q-T INTERVAL: 464 MS
EKG QRS DURATION: 86 MS
EKG QRS DURATION: 88 MS
EKG QRS DURATION: 88 MS
EKG QTC CALCULATION (BAZETT): 410 MS
EKG QTC CALCULATION (BAZETT): 412 MS
EKG QTC CALCULATION (BAZETT): 430 MS
EKG QTC CALCULATION (BAZETT): 438 MS
EKG QTC CALCULATION (BAZETT): 459 MS
EKG R AXIS: 24 DEGREES
EKG R AXIS: 26 DEGREES
EKG R AXIS: 27 DEGREES
EKG R AXIS: 40 DEGREES
EKG R AXIS: 47 DEGREES
EKG T AXIS: -27 DEGREES
EKG T AXIS: 102 DEGREES
EKG T AXIS: 44 DEGREES
EKG T AXIS: 48 DEGREES
EKG T AXIS: 70 DEGREES
EKG VENTRICULAR RATE: 57 BPM
EKG VENTRICULAR RATE: 59 BPM
EKG VENTRICULAR RATE: 60 BPM
EKG VENTRICULAR RATE: 68 BPM
EKG VENTRICULAR RATE: 70 BPM
GFR AFRICAN AMERICAN: >60
GFR NON-AFRICAN AMERICAN: >60
GLOBULIN: 2.3 G/DL (ref 2.3–3.5)
GLUCOSE BLD-MCNC: 107 MG/DL (ref 70–99)
HBA1C MFR BLD: 5.6 % (ref 4.8–5.9)
HCT VFR BLD CALC: 34.9 % (ref 42–52)
HDLC SERPL-MCNC: 32 MG/DL (ref 40–59)
HEMOGLOBIN: 11.9 G/DL (ref 14–18)
LDL CHOLESTEROL CALCULATED: 100 MG/DL (ref 0–129)
MAGNESIUM: 2.1 MG/DL (ref 1.7–2.4)
MCH RBC QN AUTO: 30.6 PG (ref 27–31.3)
MCHC RBC AUTO-ENTMCNC: 34 % (ref 33–37)
MCV RBC AUTO: 89.9 FL (ref 80–100)
PDW BLD-RTO: 13.3 % (ref 11.5–14.5)
PERFORMED ON: ABNORMAL
PERFORMED ON: ABNORMAL
PERFORMED ON: NORMAL
PLATELET # BLD: 264 K/UL (ref 130–400)
POC ACTIVATED CLOTTING TIME KAOLIN: 147 SEC (ref 82–152)
POC ACTIVATED CLOTTING TIME KAOLIN: 208 SEC (ref 82–152)
POC ACTIVATED CLOTTING TIME KAOLIN: 444 SEC (ref 82–152)
POC SAMPLE TYPE: ABNORMAL
POC SAMPLE TYPE: ABNORMAL
POC SAMPLE TYPE: NORMAL
POTASSIUM REFLEX MAGNESIUM: 3.9 MEQ/L (ref 3.4–4.9)
POTASSIUM SERPL-SCNC: 3.9 MEQ/L (ref 3.4–4.9)
RBC # BLD: 3.89 M/UL (ref 4.7–6.1)
SODIUM BLD-SCNC: 135 MEQ/L (ref 135–144)
TOTAL PROTEIN: 6.1 G/DL (ref 6.3–8)
TRIGL SERPL-MCNC: 118 MG/DL (ref 0–150)
TSH REFLEX: 3.31 UIU/ML (ref 0.44–3.86)
WBC # BLD: 8.2 K/UL (ref 4.8–10.8)

## 2020-09-14 PROCEDURE — 83735 ASSAY OF MAGNESIUM: CPT

## 2020-09-14 PROCEDURE — 80053 COMPREHEN METABOLIC PANEL: CPT

## 2020-09-14 PROCEDURE — 6370000000 HC RX 637 (ALT 250 FOR IP): Performed by: NURSE PRACTITIONER

## 2020-09-14 PROCEDURE — 6370000000 HC RX 637 (ALT 250 FOR IP): Performed by: INTERNAL MEDICINE

## 2020-09-14 PROCEDURE — 93005 ELECTROCARDIOGRAM TRACING: CPT | Performed by: INTERNAL MEDICINE

## 2020-09-14 PROCEDURE — 36415 COLL VENOUS BLD VENIPUNCTURE: CPT

## 2020-09-14 PROCEDURE — 6360000002 HC RX W HCPCS: Performed by: NURSE PRACTITIONER

## 2020-09-14 PROCEDURE — 85027 COMPLETE CBC AUTOMATED: CPT

## 2020-09-14 PROCEDURE — 84443 ASSAY THYROID STIM HORMONE: CPT

## 2020-09-14 PROCEDURE — 83036 HEMOGLOBIN GLYCOSYLATED A1C: CPT

## 2020-09-14 PROCEDURE — 2580000003 HC RX 258: Performed by: NURSE PRACTITIONER

## 2020-09-14 PROCEDURE — 80061 LIPID PANEL: CPT

## 2020-09-14 RX ORDER — ROSUVASTATIN CALCIUM 5 MG/1
5 TABLET, COATED ORAL DAILY
Qty: 30 TABLET | Refills: 0 | Status: SHIPPED | OUTPATIENT
Start: 2020-09-14 | End: 2021-06-09

## 2020-09-14 RX ORDER — CHOLECALCIFEROL (VITAMIN D3) 125 MCG
100 CAPSULE ORAL DAILY
Qty: 30 CAPSULE | Refills: 3 | Status: SHIPPED | OUTPATIENT
Start: 2020-09-14 | End: 2021-06-09

## 2020-09-14 RX ADMIN — TICAGRELOR 90 MG: 90 TABLET ORAL at 12:22

## 2020-09-14 RX ADMIN — ONDANSETRON 4 MG: 2 INJECTION INTRAMUSCULAR; INTRAVENOUS at 07:34

## 2020-09-14 RX ADMIN — CARVEDILOL 3.12 MG: 3.12 TABLET, FILM COATED ORAL at 09:04

## 2020-09-14 RX ADMIN — TICAGRELOR 90 MG: 90 TABLET ORAL at 00:02

## 2020-09-14 RX ADMIN — Medication 400 MG: at 09:04

## 2020-09-14 RX ADMIN — ASPIRIN 81 MG: 81 TABLET, CHEWABLE ORAL at 09:04

## 2020-09-14 RX ADMIN — Medication 10 ML: at 09:00

## 2020-09-14 RX ADMIN — ENOXAPARIN SODIUM 40 MG: 40 INJECTION SUBCUTANEOUS at 09:04

## 2020-09-14 RX ADMIN — RANOLAZINE 500 MG: 500 TABLET, FILM COATED, EXTENDED RELEASE ORAL at 09:04

## 2020-09-14 ASSESSMENT — PAIN SCALES - GENERAL
PAINLEVEL_OUTOF10: 0

## 2020-09-14 NOTE — PROGRESS NOTES
1915 handoff preformed at bedside sheath site intact with no bleeding or bruising   2000 assessments preformed as charted patient says that he is still having a little dull pain that he has had since procedure   2100 pt asked about Amlodipine that he takes at home wondered why he isn't getting it here left message with [address]   0000 assessments preformed as charted patient had no complaints. 0400 assessments preformed as charted patient had no complaints.   0600 EKG preformed see chart   Handoff given to RN at bedside  Electronically signed by Zach García RN on 9/14/2020 at 7:32 AM

## 2020-09-14 NOTE — FLOWSHEET NOTE
7a-130p     Pt s/p PCI. Right groin soft. 2+ plus. Pt denies chest pain. Dr. William Zuniga in to see patient and discharged patient.  Follow-up appointment made Pt discharged to home with scripts

## 2020-09-14 NOTE — CARE COORDINATION
MET WITH PATIENT, NOTIFIED OF CHANGE TO INPATIENT. IMM REVIEWED, PT SIGNED AND VERBALIZED UNDERSTANDING. PT DENIES ANY HOME NEEDS, CARDIAC REHAB ORDERED.

## 2020-09-14 NOTE — PROGRESS NOTES
[facility] [place] Note      Patient: Lilli Drew  Unit/Bed: IC08/IC08-01  YOB: 1950  MRN: [id number]  Admit Date:  9/12/2020  HOSPITAL day #       Subjective Complaint:   Denies any chest pain with exertion or at rest, palpitations, syncope, or edema. .     Physical Examination:     BP (!) 153/70   Pulse 67   Temp 97.8 °F (36.6 °C) (Oral)   Resp 19   Ht 6' 4\" (1.93 m)   Wt 214 lb 4.6 oz (97.2 kg)   SpO2 96%   BMI 26.08 kg/m²         Intake/Output Summary (Last 24 hours) at 9/14/2020 1008  Last data filed at 9/14/2020 0909  Gross per 24 hour   Intake 1310 ml   Output 3150 ml   Net -1840 ml     Weights  Wt Readings from Last 3 Encounters:   09/14/20 214 lb 4.6 oz (97.2 kg)   08/04/20 219 lb (99.3 kg)   11/06/19 218 lb (98.9 kg)       General:  Awake, alert, oriented X 3. No apparent distress. Heart:  Regular rate Regular rhythm, S1> S2 no murmurs, gallops, or rubs. Lungs: CTA bilaterally, bilat symmetrical expansion, no wheeze, rales, or rhonchi. Abdomen: Bowel sounds present, soft, nontender,  no peritoneal signs  Extremities:  No clubbing No edema Distal pulses are palpable  Skin: Warm and dry  Mood and affect: Appropriate for the circumstance. Right groin has no hematoma or ecchymosis. Telemetry:      normal sinus          LABS:   CBC:   Recent Labs     09/12/20 2006 09/13/20 0743 09/14/20 0512   WBC 7.9 6.4 8.2   HGB 11.8* 12.2* 11.9*    270 264      BMP:    Recent Labs     09/12/20 1942 09/13/20 0743 09/14/20 0512    139 135   K 5.1* 4.1 3.9  3.9    106 103   CO2 26 22 26   BUN 19 16 11   CREATININE 1.08 0.88 0.82   GLUCOSE 117* 112* 107*              Troponin: No results for input(s): TROPONINT in the last 72 hours. BNP: No results for input(s): PROBNP in the last 72 hours.    INR:   Recent Labs     09/12/20  1943   INR 1.0      Mg:   Recent Labs     09/14/20  0512   MG 2.1       Cardiac Testing:  Please see cardiac catheterization report by  Gertrude. Patient had 99% stenosis of the mid left anterior descending artery involving the origin of a moderately large diagonal branch. RCA had about 50 to 60% mid stenosis unchanged from prior angiography. Left ventricular ejection fraction was preserved. I have personally reviewed the films. Patient underwent PCI and stenting of the LAD and dilatation of the diagonal ostium followed by kissing balloon inflations. Final results were excellent. Assessment:    Acute coronary syndrome  Status post PCI and stenting of the left anterior descending artery  Preserved LV systolic function  Statin intolerance  Prior PCI and stenting of the RCA for acute coronary syndrome around 2017. Plan:  Patient agrees to try statin again. I had gotten him Praluent in the past, but he did not feel well on the Praluent either. The only other option is to try Repatha. We will start with low-dose rosuvastatin, and add coenzyme Q 10 100 mg p.o. daily to it. Discussed the importance of dual antiplatelet therapy, patient will be discharged on aspirin and Brilinta.        Electronically signed by Farhad Hussein MD on 9/14/2020 at 10:08 AM

## 2020-09-14 NOTE — PLAN OF CARE
Problem: Pain:  Goal: Pain level will decrease  9/14/2020 0749 by Lien Hernandez RN  Outcome: Ongoing  9/13/2020 2319 by Chad Obrien RN  Outcome: Ongoing  Goal: Control of acute pain  9/14/2020 0749 by Lien Hernandez RN  Outcome: Ongoing  9/13/2020 2319 by Chad Obrien RN  Outcome: Ongoing  Goal: Control of chronic pain  9/14/2020 0749 by Lien Hernandez RN  Outcome: Ongoing  9/13/2020 2319 by Chad Obrien RN  Outcome: Ongoing     Problem: Discharge Planning:  Goal: Discharged to appropriate level of care  Outcome: Ongoing     Problem: Fluid Volume - Imbalance:  Goal: Will show no signs and symptoms of excessive bleeding  Outcome: Ongoing  Goal: Absence of imbalanced fluid volume signs and symptoms  Outcome: Ongoing     Problem: Anxiety:  Goal: Level of anxiety will decrease  Outcome: Ongoing     Problem: Cardiac Output - Decreased:  Goal: Cardiac output within specified parameters  Outcome: Ongoing     Problem: Serum Glucose Level - Abnormal:  Goal: Ability to maintain appropriate glucose levels will improve  Outcome: Ongoing     Problem: Tissue Perfusion - Cardiopulmonary, Altered:  Goal: Circulatory function within specified parameters  Outcome: Ongoing  Goal: Absence of angina  Outcome: Ongoing  Goal: Hemodynamic stability will improve  Outcome: Ongoing     Problem: Tissue Perfusion - Peripheral, Altered:  Goal: Absence of hematoma at arterial access site  Outcome: Ongoing  Goal: Circulatory function of lower extremities is within specified parameters  Outcome: Ongoing     Problem: Tissue Perfusion - Renal, Altered:  Goal: Electrolytes within specified parameters  Outcome: Ongoing  Goal: Urine creatinine clearance will be within specified parameters  Outcome: Ongoing  Goal: Serum creatinine will be within specified parameters  Outcome: Ongoing  Goal: Ability to achieve a balanced intake and output will improve  Outcome: Ongoing     Problem: Tobacco Use:  Goal: Will participate in inpatient tobacco-use cessation counseling  Outcome: Ongoing

## 2020-09-14 NOTE — CONSULTS
Consult received from Dr. Camelia Coker w/ thanks. Program introduced & brochure given. Will follow.

## 2020-09-15 PROCEDURE — 93010 ELECTROCARDIOGRAM REPORT: CPT | Performed by: INTERNAL MEDICINE

## 2020-09-15 NOTE — DISCHARGE SUMMARY
Hospital Medicine Discharge Summary    Thomas Crane  :  1950  MRN:  [id number]    Admit date:  2020  Discharge date:  2020    Admitting Physician: Janet Ingram MD  Primary Care Physician:  Jam Flood MD    Thomas Crane is a 65 y.o. male that was admitted and treated at [facility] for the following medical issues:     Principal Problem:    Chest pain  Active Problems:    Stented coronary artery    CAD (coronary artery disease)    Hypothyroid    HTN (hypertension)    Unstable angina (HCC)  Resolved Problems:    * No resolved hospital problems. *      Discharge Diagnoses:    Principal Problem:    Chest pain  Active Problems:    Stented coronary artery    CAD (coronary artery disease)    Hypothyroid    HTN (hypertension)    Unstable angina (HCC)  Resolved Problems:    * No resolved hospital problems. *    Chief Complaint   Patient presents with    Chest Pain       Hospital Course:   Thomas Crane is a 65 y.o. male who was admitted to the hospital with non-ST elevation MI.  PCI intervention. Followed by cardiology. Subsequently discharged with outpatient follow-up. Pt was discharge in a stable condition. /60   Pulse 61   Temp 98.5 °F (36.9 °C) (Oral)   Resp 21   Ht 6' 4\" (1.93 m)   Wt 214 lb 4.6 oz (97.2 kg)   SpO2 97%   BMI 26.08 kg/m²     Patient was seen by the following consultants while admitted to [facility]:   Consults:  IP CONSULT TO CARDIOLOGY  IP CONSULT TO CARDIAC REHAB    Significant Diagnostic Studies:    Refer to chart if  Xr Chest Portable    Result Date: 2020  EXAMINATION: CHEST PORTABLE VIEW  CLINICAL HISTORY: Midsternal chest pain COMPARISONS: 2019  FINDINGS: 2 views of the chest is submitted. The cardiac silhouette is enlarged. Pulmonary vascular unremarkable. Right sided trachea. No focal infiltrates. No Pneumothoraces. NO ACUTE ACTIVE CARDIOPULMONARY PROCESS      Discharge Medications:       Khadijah Lang \"IAM\"   Home Medication Instructions LQI:[id number]    Printed on:09/14/20 5810   Medication Information                      amLODIPine (NORVASC) 5 MG tablet  daily              aspirin 81 MG tablet  Take 81 mg by mouth daily. baclofen (LIORESAL) 10 MG tablet  1 tablet 3 times daily             carvedilol (COREG) 3.125 MG tablet  3.125 mg 2 times daily              coenzyme Q-10 100 MG capsule  Take 1 capsule by mouth daily             levothyroxine (SYNTHROID) 100 MCG tablet  TAKE 1 TABLET DAILY             nitroGLYCERIN (NITROSTAT) 0.4 MG SL tablet  Place 1 tablet under the tongue every 5 minutes as needed for Chest pain up to max of 3 total doses. If no relief after 1 dose, call 911. rosuvastatin (CRESTOR) 5 MG tablet  Take 1 tablet by mouth daily             ticagrelor (BRILINTA) 90 MG TABS tablet  Take 1 tablet by mouth 2 times daily             vitamin B-12 (CYANOCOBALAMIN) 1000 MCG tablet  Take 1,000 mcg by mouth daily                  Disposition:   If discharged to Home, [facility] needs that were indicated and/or required as been addressed and set up by Social Work. Condition at discharge: Pt was medically stable at the time of discharge. Activity: activity as tolerated    Total time taken for discharging this patient: 40 minutes. Greater than 70% of time was spent focused exclusively on this patient. Time was taken to review chart, discuss plans with consultants, reconciling medications, discussing plan answering questions with patient.      Signed:  Richard Cortes

## 2020-09-29 ENCOUNTER — OFFICE VISIT (OUTPATIENT)
Dept: FAMILY MEDICINE CLINIC | Age: 70
End: 2020-09-29
Payer: MEDICARE

## 2020-09-29 PROBLEM — M35.3 PMR (POLYMYALGIA RHEUMATICA) (HCC): Status: ACTIVE | Noted: 2020-09-29

## 2020-09-29 PROBLEM — J84.10 LUNG GRANULOMA (HCC): Status: ACTIVE | Noted: 2020-09-29

## 2020-09-29 PROCEDURE — 99214 OFFICE O/P EST MOD 30 MIN: CPT | Performed by: NURSE PRACTITIONER

## 2020-09-29 PROCEDURE — 1111F DSCHRG MED/CURRENT MED MERGE: CPT | Performed by: NURSE PRACTITIONER

## 2020-09-29 RX ORDER — TRIAMCINOLONE ACETONIDE 0.25 MG/G
CREAM TOPICAL
Qty: 1 TUBE | Refills: 1 | Status: SHIPPED | OUTPATIENT
Start: 2020-09-29 | End: 2021-06-09

## 2020-09-29 RX ORDER — PANTOPRAZOLE SODIUM 40 MG/1
40 TABLET, DELAYED RELEASE ORAL DAILY
COMMUNITY
Start: 2020-09-22 | End: 2021-06-09

## 2020-09-29 RX ORDER — CLOPIDOGREL BISULFATE 75 MG/1
75 TABLET ORAL DAILY
COMMUNITY
Start: 2020-09-22

## 2020-09-29 SDOH — ECONOMIC STABILITY: TRANSPORTATION INSECURITY
IN THE PAST 12 MONTHS, HAS THE LACK OF TRANSPORTATION KEPT YOU FROM MEDICAL APPOINTMENTS OR FROM GETTING MEDICATIONS?: NO

## 2020-09-29 SDOH — ECONOMIC STABILITY: TRANSPORTATION INSECURITY
IN THE PAST 12 MONTHS, HAS LACK OF TRANSPORTATION KEPT YOU FROM MEETINGS, WORK, OR FROM GETTING THINGS NEEDED FOR DAILY LIVING?: NO

## 2020-09-29 SDOH — ECONOMIC STABILITY: INCOME INSECURITY: HOW HARD IS IT FOR YOU TO PAY FOR THE VERY BASICS LIKE FOOD, HOUSING, MEDICAL CARE, AND HEATING?: NOT HARD AT ALL

## 2020-09-29 SDOH — ECONOMIC STABILITY: FOOD INSECURITY: WITHIN THE PAST 12 MONTHS, THE FOOD YOU BOUGHT JUST DIDN'T LAST AND YOU DIDN'T HAVE MONEY TO GET MORE.: NEVER TRUE

## 2020-09-29 SDOH — ECONOMIC STABILITY: FOOD INSECURITY: WITHIN THE PAST 12 MONTHS, YOU WORRIED THAT YOUR FOOD WOULD RUN OUT BEFORE YOU GOT MONEY TO BUY MORE.: NEVER TRUE

## 2020-09-29 NOTE — PROGRESS NOTES
Post-Discharge Transitional Care Management Services or Hospital Follow Up      Pascual Woods   YOB: 1950    Date of Office Visit:  9/29/2020  Date of Hospital Admission: 9/12/20  Date of Hospital Discharge: 9/14/20  Readmission Risk Score(high >=14%. Medium >=10%):Readmission Risk Score: 9      Care management risk score Rising risk (score 2-5) and Complex Care (Scores >=6): 1     Non face to face  following discharge, date last encounter closed (first attempt may have been earlier): *No documented post hospital discharge outreach found in the last 14 days *No documented post hospital discharge outreach found in the last 14 days    Call initiated 2 business days of discharge: *No response recorded in the last 14 days     Patient Active Problem List   Diagnosis    Congenital heart disease    Knee pain    CAD (coronary artery disease)    ST elevation myocardial infarction involving left anterior descending (LAD) [facility] artery [facility]    Chest pain    Hypothyroid    HTN (hypertension)    Unstable angina [facility] [facility]    Stented coronary artery    PMR (polymyalgia rheumatica) ([facility] [address] [facility]    Lung granuloma ([facility] [address] [facility]       No Known Allergies    Medications listed as ordered at the time of discharge from hospital   Isidro Kaye \"IAM\"   Home Medication Instructions RUBIO:    Printed on:10/05/20 2655   Medication Information                      amLODIPine (NORVASC) 5 MG tablet  daily              aspirin 81 MG tablet  Take 81 mg by mouth daily. baclofen (LIORESAL) 10 MG tablet  1 tablet 3 times daily             carvedilol (COREG) 3.125 MG tablet  3.125 mg 2 times daily              clopidogrel (PLAVIX) 75 MG tablet  take 8 tablets by mouth tomorrow (600MG) then take 1 tablet by mo. ..  (REFER TO PRESCRIPTION NOTES).              coenzyme Q-10 100 MG capsule  Take 1 capsule by mouth daily             levothyroxine (SYNTHROID) 100 MCG tablet  TAKE 1 TABLET DAILY             nitroGLYCERIN (NITROSTAT) 0.4 MG SL tablet  Place 1 tablet under the tongue every 5 minutes as needed for Chest pain up to max of 3 total doses. If no relief after 1 dose, call 911. pantoprazole (PROTONIX) 40 MG tablet  take 1 tablet by mouth once daily             rosuvastatin (CRESTOR) 5 MG tablet  Take 1 tablet by mouth daily             triamcinolone (KENALOG) 0.025 % cream  Apply topically 2 times daily. vitamin B-12 (CYANOCOBALAMIN) 1000 MCG tablet  Take 1,000 mcg by mouth daily                    Medications marked \"taking\" at this time  Outpatient Medications Marked as Taking for the 9/29/20 encounter (Office Visit) with HERBER Knapp CNP   Medication Sig Dispense Refill    clopidogrel (PLAVIX) 75 MG tablet take 8 tablets by mouth tomorrow (600MG) then take 1 tablet by mo. ..  (REFER TO PRESCRIPTION NOTES).  pantoprazole (PROTONIX) 40 MG tablet take 1 tablet by mouth once daily      triamcinolone (KENALOG) 0.025 % cream Apply topically 2 times daily. 1 Tube 1    rosuvastatin (CRESTOR) 5 MG tablet Take 1 tablet by mouth daily 30 tablet 0    coenzyme Q-10 100 MG capsule Take 1 capsule by mouth daily 30 capsule 3    baclofen (LIORESAL) 10 MG tablet 1 tablet 3 times daily 90 tablet 3    levothyroxine (SYNTHROID) 100 MCG tablet TAKE 1 TABLET DAILY 90 tablet 4    amLODIPine (NORVASC) 5 MG tablet daily       carvedilol (COREG) 3.125 MG tablet 3.125 mg 2 times daily       nitroGLYCERIN (NITROSTAT) 0.4 MG SL tablet Place 1 tablet under the tongue every 5 minutes as needed for Chest pain up to max of 3 total doses. If no relief after 1 dose, call 911. 25 tablet 3    aspirin 81 MG tablet Take 81 mg by mouth daily.  vitamin B-12 (CYANOCOBALAMIN) 1000 MCG tablet Take 1,000 mcg by mouth daily           Medications patient taking as of now reconciled against medications ordered at time of hospital discharge: Yes    Chief Complaint   Patient presents with    Rash     Pt. c/o a rash on his scalp. Pt. c/o redness and itching.  Follow-Up from Hospital     Pt. is here for an ER f/u from [id number] [facility] on 09/17/2020 for chest pain. Pt. had a stent placed and still has c/o SOB and chest tightness.  Results     Pt. states he had an ECHO last week and a Stress Test yesterday. HPI    Inpatient course: Discharge summary reviewed- see chart. Interval history/Current status: stable    Review of Systems   Constitutional: Positive for fatigue. Negative for activity change, appetite change, chills, diaphoresis, fever and unexpected weight change. HENT: Negative. Eyes: Negative. Respiratory: Positive for shortness of breath. Negative for cough, chest tightness and wheezing. Cardiovascular: Negative. Gastrointestinal: Negative. Genitourinary: Negative. Musculoskeletal: Negative for arthralgias, gait problem, joint swelling and myalgias. Skin: Positive for rash. Neurological: Negative for dizziness, syncope, weakness, light-headedness and headaches. Psychiatric/Behavioral: Negative. Vitals:    09/29/20 1545   BP: 138/68   Site: Left Upper Arm   Position: Sitting   Cuff Size: Medium Adult   Pulse: 62   Resp: 18   Temp: 97.2 °F (36.2 °C)   TempSrc: Temporal   SpO2: 99%   Weight: 215 lb (97.5 kg)   Height: 6' 4\" (1.93 m)     Body mass index is 26.17 kg/m². Wt Readings from Last 3 Encounters:   09/29/20 215 lb (97.5 kg)   09/23/20 215 lb (97.5 kg)   09/14/20 214 lb 4.6 oz (97.2 kg)     BP Readings from Last 3 Encounters:   09/29/20 138/68   09/14/20 128/60   08/04/20 (!) 154/86       Physical Exam  Constitutional:       General: He is not in acute distress. Appearance: Normal appearance. He is not toxic-appearing. HENT:      Head: Normocephalic and atraumatic. Right Ear: External ear normal.      Nose: Nose normal.      Mouth/Throat:      Mouth: Mucous membranes are moist.      Pharynx: Oropharynx is clear. Eyes:      Extraocular Movements: Extraocular movements intact. Conjunctiva/sclera: Conjunctivae normal.      Pupils: Pupils are equal, round, and reactive to light. Neck:      Musculoskeletal: Normal range of motion and neck supple. No muscular tenderness. Cardiovascular:      Rate and Rhythm: Normal rate and regular rhythm. Heart sounds: Normal heart sounds. Pulmonary:      Effort: Pulmonary effort is normal. No respiratory distress. Breath sounds: Normal breath sounds. No stridor. No wheezing, rhonchi or rales. Chest:      Chest wall: No tenderness. Abdominal:      General: Bowel sounds are normal. There is no distension. Tenderness: There is no abdominal tenderness. There is no guarding or rebound. Lymphadenopathy:      Cervical: No cervical adenopathy. Skin:     General: Skin is warm and dry. Findings: Rash (macular to scalp) present. Neurological:      General: No focal deficit present. Mental Status: He is alert and oriented to person, place, and time. Mental status is at baseline. Cranial Nerves: No cranial nerve deficit. Motor: No weakness. Psychiatric:         Mood and Affect: Mood normal.         Behavior: Behavior normal.             Assessment/Plan:  1. Chest pain, unspecified type    - OK DISCHARGE MEDS RECONCILED W/ CURRENT OUTPATIENT MED LIST    2. SOB (shortness of breath)    - XR CHEST STANDARD (2 VW); Future  - OK DISCHARGE MEDS RECONCILED W/ CURRENT OUTPATIENT MED LIST    3. PMR (polymyalgia rheumatica) ([facility]  -stable    4. Lung granuloma ([facility])  -stable  5. Dermatitis    - triamcinolone (KENALOG) 0.025 % cream; Apply topically 2 times daily.   Dispense: 1 Tube; Refill: 1

## 2020-10-05 ENCOUNTER — HOSPITAL ENCOUNTER (OUTPATIENT)
Age: 70
Setting detail: SPECIMEN
Discharge: HOME | End: 2020-10-05
Payer: MEDICARE

## 2020-10-05 ENCOUNTER — NURSE ONLY (OUTPATIENT)
Dept: PRIMARY CARE CLINIC | Age: 70
End: 2020-10-05

## 2020-10-05 VITALS
RESPIRATION RATE: 18 BRPM | OXYGEN SATURATION: 99 % | HEART RATE: 62 BPM | SYSTOLIC BLOOD PRESSURE: 138 MMHG | HEIGHT: 76 IN | BODY MASS INDEX: 26.18 KG/M2 | DIASTOLIC BLOOD PRESSURE: 68 MMHG | WEIGHT: 215 LBS | TEMPERATURE: 97.2 F

## 2020-10-05 LAB
ANION GAP SERPL CALCULATED.3IONS-SCNC: 11 MEQ/L (ref 9–15)
BUN BLDV-MCNC: 14 MG/DL (ref 8–23)
CALCIUM SERPL-MCNC: 9.4 MG/DL (ref 8.5–9.9)
CHLORIDE BLD-SCNC: 99 MEQ/L (ref 95–107)
CO2: 25 MEQ/L (ref 20–31)
CREAT SERPL-MCNC: 0.86 MG/DL (ref 0.7–1.2)
GFR AFRICAN AMERICAN: >60
GFR NON-AFRICAN AMERICAN: >60
GLUCOSE BLD-MCNC: 101 MG/DL (ref 70–99)
HCT VFR BLD CALC: 37.2 % (ref 42–52)
HEMOGLOBIN: 12.7 G/DL (ref 14–18)
MCH RBC QN AUTO: 31 PG (ref 27–31.3)
MCHC RBC AUTO-ENTMCNC: 34.1 % (ref 33–37)
MCV RBC AUTO: 90.8 FL (ref 80–100)
PDW BLD-RTO: 13.1 % (ref 11.5–14.5)
PLATELET # BLD: 275 K/UL (ref 130–400)
POTASSIUM SERPL-SCNC: 3.9 MEQ/L (ref 3.4–4.9)
RBC # BLD: 4.1 M/UL (ref 4.7–6.1)
SARS-COV-2, NAAT: NOT DETECTED
SODIUM BLD-SCNC: 135 MEQ/L (ref 135–144)
WBC # BLD: 6.4 K/UL (ref 4.8–10.8)

## 2020-10-05 PROCEDURE — U0002 COVID-19 LAB TEST NON-CDC: HCPCS

## 2020-10-05 ASSESSMENT — ENCOUNTER SYMPTOMS
GASTROINTESTINAL NEGATIVE: 1
CHEST TIGHTNESS: 0
SHORTNESS OF BREATH: 1
EYES NEGATIVE: 1
WHEEZING: 0
COUGH: 0

## 2020-10-06 ENCOUNTER — HOSPITAL ENCOUNTER (OUTPATIENT)
Dept: CARDIAC CATH/INVASIVE PROCEDURES | Age: 70
Setting detail: OBSERVATION
Discharge: HOME | End: 2020-10-07
Attending: INTERNAL MEDICINE | Admitting: INTERNAL MEDICINE
Payer: MEDICARE

## 2020-10-06 PROCEDURE — C9600 PERC DRUG-EL COR STENT SING: HCPCS | Performed by: INTERNAL MEDICINE

## 2020-10-06 PROCEDURE — 2580000003 HC RX 258: Performed by: INTERNAL MEDICINE

## 2020-10-06 PROCEDURE — 2500000003 HC RX 250 WO HCPCS

## 2020-10-06 PROCEDURE — C1725 CATH, TRANSLUMIN NON-LASER: HCPCS

## 2020-10-06 PROCEDURE — 93571 IV DOP VEL&/PRESS C FLO 1ST: CPT | Performed by: INTERNAL MEDICINE

## 2020-10-06 PROCEDURE — 6360000004 HC RX CONTRAST MEDICATION: Performed by: INTERNAL MEDICINE

## 2020-10-06 PROCEDURE — 93458 L HRT ARTERY/VENTRICLE ANGIO: CPT | Performed by: INTERNAL MEDICINE

## 2020-10-06 PROCEDURE — 6360000002 HC RX W HCPCS

## 2020-10-06 PROCEDURE — C1874 STENT, COATED/COV W/DEL SYS: HCPCS

## 2020-10-06 PROCEDURE — C1769 GUIDE WIRE: HCPCS

## 2020-10-06 PROCEDURE — 2580000003 HC RX 258

## 2020-10-06 PROCEDURE — C1894 INTRO/SHEATH, NON-LASER: HCPCS

## 2020-10-06 PROCEDURE — C1760 CLOSURE DEV, VASC: HCPCS

## 2020-10-06 PROCEDURE — 2709999900 HC NON-CHARGEABLE SUPPLY

## 2020-10-06 PROCEDURE — 6370000000 HC RX 637 (ALT 250 FOR IP): Performed by: INTERNAL MEDICINE

## 2020-10-06 PROCEDURE — G0378 HOSPITAL OBSERVATION PER HR: HCPCS

## 2020-10-06 PROCEDURE — C1887 CATHETER, GUIDING: HCPCS

## 2020-10-06 RX ORDER — PANTOPRAZOLE SODIUM 40 MG/1
40 TABLET, DELAYED RELEASE ORAL DAILY
Status: DISCONTINUED | OUTPATIENT
Start: 2020-10-06 | End: 2020-10-07 | Stop reason: HOSPADM

## 2020-10-06 RX ORDER — AMLODIPINE BESYLATE 5 MG/1
5 TABLET ORAL DAILY
Status: DISCONTINUED | OUTPATIENT
Start: 2020-10-06 | End: 2020-10-07 | Stop reason: HOSPADM

## 2020-10-06 RX ORDER — SODIUM CHLORIDE 0.9 % (FLUSH) 0.9 %
10 SYRINGE (ML) INJECTION PRN
Status: DISCONTINUED | OUTPATIENT
Start: 2020-10-06 | End: 2020-10-07 | Stop reason: HOSPADM

## 2020-10-06 RX ORDER — SODIUM CHLORIDE 0.9 % (FLUSH) 0.9 %
10 SYRINGE (ML) INJECTION EVERY 12 HOURS SCHEDULED
Status: DISCONTINUED | OUTPATIENT
Start: 2020-10-06 | End: 2020-10-07 | Stop reason: HOSPADM

## 2020-10-06 RX ORDER — ACETAMINOPHEN 325 MG/1
650 TABLET ORAL EVERY 4 HOURS PRN
Status: DISCONTINUED | OUTPATIENT
Start: 2020-10-06 | End: 2020-10-07 | Stop reason: HOSPADM

## 2020-10-06 RX ORDER — ROSUVASTATIN CALCIUM 5 MG/1
5 TABLET, COATED ORAL DAILY
Status: DISCONTINUED | OUTPATIENT
Start: 2020-10-06 | End: 2020-10-07 | Stop reason: HOSPADM

## 2020-10-06 RX ORDER — CLOPIDOGREL BISULFATE 75 MG/1
75 TABLET ORAL DAILY
Status: DISCONTINUED | OUTPATIENT
Start: 2020-10-06 | End: 2020-10-07 | Stop reason: HOSPADM

## 2020-10-06 RX ORDER — CHOLECALCIFEROL (VITAMIN D3) 125 MCG
1000 CAPSULE ORAL DAILY
Status: DISCONTINUED | OUTPATIENT
Start: 2020-10-06 | End: 2020-10-07 | Stop reason: HOSPADM

## 2020-10-06 RX ORDER — LEVOTHYROXINE SODIUM 0.1 MG/1
100 TABLET ORAL DAILY
Status: DISCONTINUED | OUTPATIENT
Start: 2020-10-06 | End: 2020-10-07 | Stop reason: HOSPADM

## 2020-10-06 RX ORDER — SODIUM CHLORIDE 9 MG/ML
INJECTION, SOLUTION INTRAVENOUS CONTINUOUS
Status: ACTIVE | OUTPATIENT
Start: 2020-10-06 | End: 2020-10-06

## 2020-10-06 RX ORDER — UBIDECARENONE 100 MG
100 CAPSULE ORAL DAILY
Status: DISCONTINUED | OUTPATIENT
Start: 2020-10-06 | End: 2020-10-07 | Stop reason: HOSPADM

## 2020-10-06 RX ORDER — ASPIRIN 81 MG/1
81 TABLET, CHEWABLE ORAL ONCE
Status: DISCONTINUED | OUTPATIENT
Start: 2020-10-06 | End: 2020-10-07 | Stop reason: HOSPADM

## 2020-10-06 RX ORDER — ASPIRIN 81 MG/1
81 TABLET, CHEWABLE ORAL DAILY
Status: DISCONTINUED | OUTPATIENT
Start: 2020-10-06 | End: 2020-10-07 | Stop reason: HOSPADM

## 2020-10-06 RX ORDER — CARVEDILOL 3.12 MG/1
3.12 TABLET ORAL 2 TIMES DAILY
Status: DISCONTINUED | OUTPATIENT
Start: 2020-10-06 | End: 2020-10-07 | Stop reason: HOSPADM

## 2020-10-06 RX ORDER — NITROGLYCERIN 0.4 MG/1
0.4 TABLET SUBLINGUAL EVERY 5 MIN PRN
Status: DISCONTINUED | OUTPATIENT
Start: 2020-10-06 | End: 2020-10-07 | Stop reason: HOSPADM

## 2020-10-06 RX ORDER — SODIUM CHLORIDE 9 MG/ML
INJECTION, SOLUTION INTRAVENOUS CONTINUOUS
Status: DISCONTINUED | OUTPATIENT
Start: 2020-10-06 | End: 2020-10-07 | Stop reason: HOSPADM

## 2020-10-06 RX ADMIN — IOVERSOL 280 ML: 678 INJECTION INTRA-ARTERIAL; INTRAVENOUS at 12:22

## 2020-10-06 RX ADMIN — Medication 10 ML: at 21:05

## 2020-10-06 RX ADMIN — SODIUM CHLORIDE: 9 INJECTION, SOLUTION INTRAVENOUS at 16:28

## 2020-10-06 RX ADMIN — ROSUVASTATIN CALCIUM 5 MG: 5 TABLET, FILM COATED ORAL at 21:04

## 2020-10-06 RX ADMIN — CARVEDILOL 3.12 MG: 3.12 TABLET, FILM COATED ORAL at 21:04

## 2020-10-06 NOTE — PROGRESS NOTES
Pt returned from procedure, vitals are stable, A/O x 4, no bleeding or shortness of breath, no bleeding or hematoma at puncture site, will continue to monitor.

## 2020-10-06 NOTE — BRIEF OP NOTE
Patient underwent coronary angiography PCI and stenting of the mid RCA, IFR of the ostial proximal circumflex and IFR of the left anterior descending artery via the right common femoral artery. Angio-Seal hemostatic device was used. Left ventricular ejection fraction is about 50% LVEDP 5 mmHg no systolic gradient across the aortic valve  Right dominant system, with the 70% stenosis in the mid RCA unchanged from prior angiography however given patient's symptoms and high level of anxiety, we have decided to proceed with PCI and stenting of this system. 3.5 x 18 mm Xience Kylah drug-eluting stent was used. IFR of the proximal circumflex was negative, medical therapy to be continued  Recently deployed stent in the left anterior descending artery is widely patent IFR of the left anterior descending artery is 0.98. Diagonal branch is widely patent with about 30% mid stenosis    Angio-Seal hemostatic device was used. Given contrast load, patient will be observed on telemetry, will be hydrated, laboratory data will be checked in the morning, anticipate discharge tomorrow. High level of anxiety, will defer to primary service.   Full note to follow

## 2020-10-06 NOTE — CONSULTS
Consult received from Dr. Michelle Espinoza. On it noted that the pt. does not want rehab. Pt. Also verbally refused in 9/2020. Consult closed.

## 2020-10-07 VITALS
HEIGHT: 75 IN | BODY MASS INDEX: 27.1 KG/M2 | DIASTOLIC BLOOD PRESSURE: 62 MMHG | RESPIRATION RATE: 18 BRPM | TEMPERATURE: 98.1 F | HEART RATE: 58 BPM | SYSTOLIC BLOOD PRESSURE: 134 MMHG | WEIGHT: 218 LBS | OXYGEN SATURATION: 98 %

## 2020-10-07 LAB
ANION GAP SERPL CALCULATED.3IONS-SCNC: 11 MEQ/L (ref 9–15)
BUN BLDV-MCNC: 12 MG/DL (ref 8–23)
CALCIUM SERPL-MCNC: 8.9 MG/DL (ref 8.5–9.9)
CHLORIDE BLD-SCNC: 106 MEQ/L (ref 95–107)
CO2: 24 MEQ/L (ref 20–31)
CREAT SERPL-MCNC: 0.83 MG/DL (ref 0.7–1.2)
GFR AFRICAN AMERICAN: >60
GFR NON-AFRICAN AMERICAN: >60
GLUCOSE BLD-MCNC: 93 MG/DL (ref 70–99)
HCT VFR BLD CALC: 34.3 % (ref 42–52)
HEMOGLOBIN: 11.5 G/DL (ref 14–18)
MCH RBC QN AUTO: 30.3 PG (ref 27–31.3)
MCHC RBC AUTO-ENTMCNC: 33.6 % (ref 33–37)
MCV RBC AUTO: 90 FL (ref 80–100)
PDW BLD-RTO: 12.9 % (ref 11.5–14.5)
PLATELET # BLD: 220 K/UL (ref 130–400)
POTASSIUM SERPL-SCNC: 4.3 MEQ/L (ref 3.4–4.9)
RBC # BLD: 3.8 M/UL (ref 4.7–6.1)
SODIUM BLD-SCNC: 141 MEQ/L (ref 135–144)
WBC # BLD: 7.4 K/UL (ref 4.8–10.8)

## 2020-10-07 PROCEDURE — 2580000003 HC RX 258: Performed by: INTERNAL MEDICINE

## 2020-10-07 PROCEDURE — 80048 BASIC METABOLIC PNL TOTAL CA: CPT

## 2020-10-07 PROCEDURE — 85027 COMPLETE CBC AUTOMATED: CPT

## 2020-10-07 PROCEDURE — 36415 COLL VENOUS BLD VENIPUNCTURE: CPT

## 2020-10-07 PROCEDURE — G0378 HOSPITAL OBSERVATION PER HR: HCPCS

## 2020-10-07 PROCEDURE — 6370000000 HC RX 637 (ALT 250 FOR IP): Performed by: INTERNAL MEDICINE

## 2020-10-07 RX ADMIN — CARVEDILOL 3.12 MG: 3.12 TABLET, FILM COATED ORAL at 08:55

## 2020-10-07 RX ADMIN — ROSUVASTATIN CALCIUM 5 MG: 5 TABLET, FILM COATED ORAL at 08:55

## 2020-10-07 RX ADMIN — ASPIRIN 81 MG: 81 TABLET, CHEWABLE ORAL at 08:55

## 2020-10-07 RX ADMIN — PANTOPRAZOLE SODIUM 40 MG: 40 TABLET, DELAYED RELEASE ORAL at 08:55

## 2020-10-07 RX ADMIN — Medication 10 ML: at 08:56

## 2020-10-07 RX ADMIN — CLOPIDOGREL BISULFATE 75 MG: 75 TABLET ORAL at 08:55

## 2020-10-07 RX ADMIN — LEVOTHYROXINE SODIUM 100 MCG: 0.1 TABLET ORAL at 06:03

## 2020-10-07 RX ADMIN — CYANOCOBALAMIN TAB 500 MCG 1000 MCG: 500 TAB at 08:54

## 2020-10-07 RX ADMIN — Medication 100 MG: at 08:55

## 2020-10-07 RX ADMIN — Medication 10 ML: at 09:02

## 2020-10-07 RX ADMIN — AMLODIPINE BESYLATE 5 MG: 5 TABLET ORAL at 08:55

## 2020-10-07 NOTE — PROGRESS NOTES
Pt resting in bed in semi-carl's, A+Ox4. Pt's IVs removed: 1. Left A/C, 2. Left wrist.  IV's removed intact, sites dressed with 2X2 dressing and tape. Cardiac monitor box and leads removed. Cardiac monitor box returned to monitoring room. Margarita Noyola RN notified.

## 2020-10-07 NOTE — PROGRESS NOTES
no rebound tenderness or guarding,   Genitourinary:  deferred  Musculoskeletal:  No apparent injury. Extremities:  Normal extremities, no cyanosis, edema, DP/PT 2+ equal bilaterally. Neurological:  Alert and oriented x3. Moves extremities spontaneous with purpose  Psychological:  Appropriate mood and behavior  Skin:  Warm and dry,      Allergies: No Known Allergies    Medications:  Reviewed  Home Medications    Infusion Medications:    sodium chloride Stopped (10/06/20 1633)     Scheduled Medications:    sodium chloride flush  10 mL Intravenous 2 times per day    aspirin  81 mg Oral Once    amLODIPine  5 mg Oral Daily    aspirin  81 mg Oral Daily    carvedilol  3.125 mg Oral BID    clopidogrel  75 mg Oral Daily    coenzyme Q10  100 mg Oral Daily    levothyroxine  100 mcg Oral Daily    pantoprazole  40 mg Oral Daily    rosuvastatin  5 mg Oral Daily    fluocinonide   Topical BID    vitamin B-12  1,000 mcg Oral Daily    sodium chloride flush  10 mL Intravenous 2 times per day     PRN Meds: sodium chloride flush, nitroGLYCERIN, sodium chloride flush, acetaminophen    Vitals  Vitals:    10/07/20 0759   BP:    Pulse: 58   Resp:    Temp:    SpO2:        I&O  +250    Telemetry:  Sinus rhythm 60/sinus Darryl with HR 49-50. Labs:   Recent Labs     10/05/20  1506 10/07/20  0600   WBC 6.4 7.4   HGB 12.7* 11.5*   HCT 37.2* 34.3*    220     Recent Labs     10/05/20  1506 10/07/20  0600    141   K 3.9 4.3   CL 99 106   CO2 25 24   BUN 14 12   CREATININE 0.86 0.83   CALCIUM 9.4 8.9     No results for input(s): AST, ALT, BILIDIR, BILITOT, ALKPHOS in the last 72 hours. No results for input(s): INR in the last 72 hours. No results for input(s): M[place]te Dec in the last 72 hours.     Urinalysis:   Lab Results   Component Value Date    NITRU Negative 10/29/2017    BLOODU +- 05/16/2019    BLOODU Negative 10/29/2017    SPECGRAV 1.020 05/16/2019    SPECGRAV 1.008 10/29/2017    GLUCOSEU - 05/16/2019 GLUCOSEU Negative 10/29/2017       Radiology:     ECHO:    Assessment:  1. S/P L heart cath, PCI/stent: 10/6/2020 mid RCA, IFR of the ostial proximal circumflex and IFR of the left anterior descending artery via the right common femoral artery  2. CAD  3. HTN  4. Bradycardia:  History of bradycardia with HR 50\"s. 5. HLD    Plan:  1. May discharge home today after ambulation in halls. 2. Follow up with Dr. Isela Ferreira in office as scheduled. Active Hospital Problems    Diagnosis Date Noted    Stented coronary artery [Z95.5] 09/14/2020     Priority: High       Additional work up or/and treatment plan may be added today or then after based on clinical progression. I am managing a portion of pt care. Some medical issues are handled by other specialists. Additional work up and treatment should be done in out pt setting by pt PCP and other out pt providers. In addition to examining and evaluating pt, I spent additional time explaining care, normaland abnormal findings, and treatment plan. All of pt questions were answered. Counseling, diet and education were provided. Case will be discussed with nursing staff when appropriate. Family will be updated if and whenappropriate.       Electronically signed by HERBER Virgen CNP on 10/7/2020 at 8:29 AM

## 2020-10-08 ENCOUNTER — TELEPHONE (OUTPATIENT)
Dept: FAMILY MEDICINE CLINIC | Age: 70
End: 2020-10-08

## 2020-10-08 ENCOUNTER — CARE COORDINATION (OUTPATIENT)
Dept: CASE MANAGEMENT | Age: 70
End: 2020-10-08

## 2020-10-08 PROCEDURE — 1111F DSCHRG MED/CURRENT MED MERGE: CPT | Performed by: FAMILY MEDICINE

## 2020-10-08 NOTE — TELEPHONE ENCOUNTER
[facility] Initial Follow Up Call    Outreach made within 2 business days of discharge: Yes    Patient: Lowell Lyman Patient : 1950   MRN: [id number]  Reason for Admission: There are no discharge diagnoses documented for the most recent discharge. Discharge Date: 10/7/20       Spoke with: Patient    Discharge department/facility: [address] [facility] Patient Contact:  Was patient able to fill all prescriptions: Yes  Was patient instructed to bring all medications to the follow-up visit: Yes  Is patient taking all medications as directed in the discharge summary? Yes  Does patient understand their discharge instructions: Yes  Does patient have questions or concerns that need addressed prior to 7-14 day follow up office visit: no    Scheduled appointment with PCP within 7-14 days - CONFIRMED DETAILS OF 10/13/2020 APPT WITH PATIENT WHO WAS VERY COMPLIMENTARY WITH OUTREACH CALLS AND STAFF CARE. Follow Up  Future Appointments   Date Time Provider Osmani Christina   10/13/2020  9:30 AM Elizabeth Goldstein MD [address] [place] [address] Initial Follow Up Call    Outreach made within 2 business days of discharge: Yes    Patient: Lowell Lyman Patient : 1950   MRN: [id number]  Reason for Admission: There are no discharge diagnoses documented for the most recent discharge.   Discharge Date: 10/7/20       Spoke with: [facility]    Discharge department/facility: [address] [facility] Patient Contact:  Scheduled appointment with PCP within 7-14 days - PT REMINDED OF APPT ON MESSAGE    Follow Up  Future Appointments   Date Time Provider Osmani Christina   10/13/2020  9:30 AM Elizabeth Goldstein MD [address] [place] [address]

## 2020-10-08 NOTE — CARE COORDINATION
10/6-10/7/2020 [facility] s/p PCI/Stent  NR  10/5/2020 CV-19 lab neg  Dr Leandra Moreira 10/16 3:45, PCP 10/13 9:30  Med rec/1111F order completed. Challenges to be reviewed by the provider   Additional needs identified to be addressed with provider No  none    Discussed COVID-19 related testing which was available at this time. Test results were negative. Patient informed of results, if available? Yes         Method of communication with provider : none    Advance Care Planning:   Does patient have an Advance Directive:  not on file. Was this a readmission? No  Patient stated reason for admission: Heart Cath  Patients top risk factors for readmission: medical condition    Care Transition Nurse (CTN) contacted the patient by telephone to perform post hospital discharge assessment. Verified name and  with patient as identifiers. Provided introduction to self, and explanation of the CTN role. CTN reviewed discharge instructions, medical action plan and red flags with patient who verbalized understanding. Patient given an opportunity to ask questions and does not have any further questions or concerns at this time. Were discharge instructions available to patient? Yes. Reviewed appropriate site of care based on symptoms and resources available to patient including: When to call 911 and Reviewed symptoms of CV-19 to report. . The patient agrees to contact the PCP office for questions related to their healthcare. Medication reconciliation was performed with patient, who verbalizes understanding of administration of home medications. Advised obtaining a 90-day supply of all daily and as-needed medications. Covid Risk Education    Patient has following risk factors of: Lung disease. Education provided regarding infection prevention, and signs and symptoms of COVID-19 and when to seek medical attention with patient who verbalized understanding.  Discussed exposure protocols and quarantine From CDC: Are you at higher risk for severe illness?   and given an opportunity for questions and concerns. The patient agrees to contact the COVID-19 hotline [phone] or PCP office for questions related to COVID-19. For more information on steps you can take to protect yourself, see CDC's How to Protect Yourself     Patient/family/caregiver given information for [employer] and agrees to enroll no  Patient's preferred e-mail: declines  Patient's preferred phone number: declines    Discussed follow-up appointments. If no appointment was previously scheduled, appointment scheduling offered: Yes. Is follow up appointment scheduled within 7 days of discharge? Yes  Non-[facility] follow up appointment(s):     Plan for follow-up call in 7-10 days based on severity of symptoms and risk factors. Plan for next call: symptom management-Post cath. Pt reports no swelling/excessive bruising/draiange from insertion site. Denies chest pain/pressure, palpitation, or dizzy events. Denies any home, DME, or med needs. Pt is up walking for lt exercise and plans to begin riding his bike again in three days. CTN provided contact information for future needs.

## 2020-10-12 NOTE — OP NOTE
INDICATIONS:  The patient is a 65 y.o. male with recent presentation with acute coronary syndrome, underwent PCI and drug-eluting stent to the subtotally occluded proximal left anterior descending artery by Dr. Rachael Eugene. Patient has continued to have symptoms of chest discomfort diaphoresis shortness of breath, generalized ill feeling, we did an outpatient echocardiogram that showed preserved LV systolic function and normal anterior wall motion, patient had a treadmill stress test, he complained of 4 out of 10 chest pressure with activity. He is extremely anxious. Repeat coronary angiography is being performed because of patient's symptoms and high level of anxiety. PROCEDURE:  Left heart catheterization, coronary angiography, left ventriculography ,LV/Ao pull back and selective right common femoral angiography was performed. PCI and stenting of the mid right coronary artery was performed    Benefits, alternatives and risks of the procedure were explained to the patient to include but not limited to MI, Stroke, Death, Allergic reaction to dye, bleeding, risk of kidney injury, and possible need for emergent coronary artery bypass grafting and informed consent was obtained. The patient was brought to the cath lab and was prepped and draped in the normal sterile technique. IV conscious sedation was maintained. 1% lidocaine was used for local anesthesia. DESCRIPTION OF PROCEDURE: Under local anesthesia, a 5-Nigerien short sheath was placed in the right common femoral artery by single anterior percutaneous stick. Selective right common femoral angiography showed that the access was in the right common femoral artery above its bifurcation. A 5-Nigerien JL4 diagnostic catheter was advanced over a 0.035 guidewire but was too short, and was removed. 5 Western Meg JL 4.5 diagnostic catheter was then advanced over a 0.035 guidewire and the left main coronary artery was selectively engaged.    Angiography of the left system was performed in multiple orthogonal views. The 5-Samoan JL4.5  diagnostic catheter and the guidewire were removed. A 5-Samoan 3DRC diagnostic catheter was advanced over a 0.035 guidewire and the right coronary artery was selectively engaged. Angiography of the right coronary artery was performed in multiple orthogonal views. The 5-Samoan [facility] diagnostic catheter and guidewire were removed. A 5-Samoan angled pigtail catheter was advanced over a 0.035 guidewire across the aortic valve into the left ventricle. Left ventricular end-diastolic pressure was measured. Left ventriculography was performed in about 30° FAITH view. Slow manual pullback was performed across the aortic valve. At the conclusion of the procedure, it was decided to proceed with PCI and stenting of the mid right coronary artery, followed by IFR of the ostial proximal left circumflex artery. The existing 5 Samoan right femoral artery sheath was changed to a 6 Western Meg short sheath. Heparin was administered and ACT was documented. A 6 Western Meg AR-1 guide catheter with sideholes was advanced over a 0.035 guidewire and the Right coronary artery was selectively engaged. A 0.014 180cm Run through wire was  advanced and the lesion in the mid right coronary artery was crossed without difficulty. A 3.0 x 12 mm Freire Trek balloon was advanced over the wire and the lesion was dilated to 12 hilary. The balloon catheter was removed. A 3.5 x 23 mm Xience  Kylah drug-eluting stent was advanced with great difficulty into the segment of disease in the mid right coronary artery and deployed at 18 hilary. There was difficulty advancing the stent past the proximal left anterior descending artery. A 6 Samoan guide liner catheter was advanced, but the guide liner kept being pushed out and could not provide support. The stent and the guide liner were all retrieved intact.   A second 0.014 run-through wire was advanced alongside the first wire for additional support. The 3.5 x 23 mm Xience Kylah drug-eluting stent was then advanced into the mid right coronary artery and deployed at 15 hilary. The stent balloon was removed. A 4.0 x 8 mm noncompliant balloon was advanced, and the stent was postdilated to 13 to 15 hilary. The balloon was removed. Final angiographic results showed 0% residual stenosis with MG 3 flow and no dissection. There were no immediate complications. IFR of the ostial proximal left circumflex artery:  6 Azeri XB 4 guide catheter was advanced over a 0.035 guidewire and the left main coronary artery was selectively engaged. Flow wire by [facility] [employer] was calibrated, and advanced into the left anterior descending artery. Coronary pressure as monitored by the flow wire was normalized to the coronary pressure as monitored by the guide catheter. The flow wire was then advanced into the left circumflex artery. IFR was 0.98-1.0. The wire and the guide catheter were all removed. There were no immediate complications    The right femoral artery sheath was removed and hemostasis was achieved using Angio-Seal hemostatic device. HEMODYNAMIC / ANGIOGRAPHIC DATA:    1. Left ventricular end diastolic pressure was 5 mmHg. No systolic gradient across the aortic valve. 2. Left ventriculography revealed an EF around 50%. 3. The left main coronary artery  has 0% stenosis. 4. The left anterior descending artery has widely patent recently deployed stent in the proximal segment, with 0% residual stenosis. Mid left anterior descending artery distal to the stent has a stepdown, and about 20% diffuse disease. Distal left anterior descending artery curves around the left ventricular apex and has less than 20% stenosis septal perforators are small first and second diagonal branches are small third and major diagonal branch originates from the mid LAD from the stented segment and is wide open with 20% proximal stenosis. Mireya George   5. The left circumflex is nondominant. Proximal segment is very tortuous, difficult to lay out. There is a retrograde bend in the proximal vessel. Proximal left circumflex artery close to the ostium has about 70% stenosis but is smooth and unchanged from prior angiography. 6. IFR of the ostial proximal left circumflex arteries 0.98-1.0. Medical therapy is recommended  7. The right coronary artery is dominant, with patent radiopaque stent in the proximal segment. There is 20 to 30% diffuse disease in the proximal segment 70% stenosis in the mid RCA distal to the previously deployed stent and 20% diffuse disease in the distal RCA and its branches. 8. Successful PCI/stenting of the mid RCA, with reduction of stenosis from 70% with mild haziness to 0%. There was MG 3 flow before and after the procedure. 3.5 x 23 mm Xience Kylah drug-eluting stent was used, and postdilated with a 4.0 mm noncompliant balloon  9. RCA intervention was performed because of multitude of symptoms and concerns on patient's part, it was discussed with patient that this intervention may or may not relieve all his chest discomfort. 10. Selective right common femoral angiography showed that the access was in the right common femoral artery above its bifurcation. There was moderate mild calcification of the common femoral artery. Angio-Seal hemostatic device was used. RECOMMENDATIONS:  Post-procedure care will focus on prevention of any ischemic events and congestive complications. Aggressive risk factor modification and dual antiplatelet therapy are recommended. Patient needs to follow-up with primary care regarding his high level of anxiety.   Cardiac rehabilitation would be beneficial.    Selwyn Ring MD

## 2020-10-16 LAB
FOLATE: 9.7 NG/ML (ref 7.3–26.1)
IRON SATURATION: 22 % (ref 11–46)
IRON: 60 UG/DL (ref 59–158)
TOTAL IRON BINDING CAPACITY: 270 UG/DL (ref 178–450)
VITAMIN B-12: 595 PG/ML (ref 232–1245)

## 2020-10-22 ENCOUNTER — OFFICE VISIT (OUTPATIENT)
Dept: FAMILY MEDICINE CLINIC | Age: 70
End: 2020-10-22
Payer: MEDICARE

## 2020-10-22 VITALS
BODY MASS INDEX: 27.1 KG/M2 | SYSTOLIC BLOOD PRESSURE: 126 MMHG | HEART RATE: 55 BPM | HEIGHT: 75 IN | WEIGHT: 218 LBS | TEMPERATURE: 97.5 F | OXYGEN SATURATION: 98 % | RESPIRATION RATE: 18 BRPM | DIASTOLIC BLOOD PRESSURE: 68 MMHG

## 2020-10-22 PROCEDURE — 1111F DSCHRG MED/CURRENT MED MERGE: CPT | Performed by: FAMILY MEDICINE

## 2020-10-22 PROCEDURE — 99214 OFFICE O/P EST MOD 30 MIN: CPT | Performed by: FAMILY MEDICINE

## 2020-10-22 RX ORDER — ONDANSETRON 4 MG/1
4 TABLET, FILM COATED ORAL EVERY 8 HOURS PRN
Qty: 30 TABLET | Refills: 3 | Status: SHIPPED | OUTPATIENT
Start: 2020-10-22 | End: 2021-06-09

## 2020-10-22 ASSESSMENT — ENCOUNTER SYMPTOMS
EYES NEGATIVE: 1
GASTROINTESTINAL NEGATIVE: 1
SHORTNESS OF BREATH: 0
RESPIRATORY NEGATIVE: 1
ALLERGIC/IMMUNOLOGIC NEGATIVE: 1

## 2020-10-22 NOTE — PROGRESS NOTES
Post-Discharge Transitional Care Management Services or Hospital Follow Up      Alix Santana   YOB: 1950    Date of Office Visit:  10/22/2020  Date of Hospital Admission: 10/6/20  Date of Hospital Discharge: 10/7/20  Readmission Risk Score(high >=14%. Medium >=10%):Readmission Risk Score: 9      Care management risk score Rising risk (score 2-5) and Complex Care (Scores >=6): 1     Non face to face  following discharge, date last encounter closed (first attempt may have been earlier): 10/9/2020  8:57 AM 10/9/2020  8:57 AM    Call initiated 2 business days of discharge: Yes     Patient Active Problem List   Diagnosis    Congenital heart disease    Knee pain    CAD (coronary artery disease)    ST elevation myocardial infarction involving left anterior descending (LAD) coronary artery [facility]    Chest pain    Hypothyroid    HTN (hypertension)    Unstable angina ([facility]    Stented coronary artery    PMR (polymyalgia rheumatica) ([facility]    Lung granuloma ([facility]       No Known Allergies    Medications listed as ordered at the time of discharge from hospital   Diana Kaye \"IAM\"   Home Medication Instructions RUBIO:    Printed on:10/22/20 0884   Medication Information                      amLODIPine (NORVASC) 5 MG tablet  daily              aspirin 81 MG tablet  Take 81 mg by mouth daily. carvedilol (COREG) 3.125 MG tablet  Take 3.125 mg by mouth 2 times daily              clopidogrel (PLAVIX) 75 MG tablet  Take 75 mg by mouth daily              coenzyme Q-10 100 MG capsule  Take 1 capsule by mouth daily             levothyroxine (SYNTHROID) 100 MCG tablet  TAKE 1 TABLET DAILY             nitroGLYCERIN (NITROSTAT) 0.4 MG SL tablet  Place 1 tablet under the tongue every 5 minutes as needed for Chest pain up to max of 3 total doses. If no relief after 1 dose, call 911.              ondansetron (ZOFRAN) 4 MG tablet  Take 1 tablet by mouth every 8 hours as needed for Nausea or Vomiting pantoprazole (PROTONIX) 40 MG tablet  Take 40 mg by mouth daily              rosuvastatin (CRESTOR) 5 MG tablet  Take 1 tablet by mouth daily             triamcinolone (KENALOG) 0.025 % cream  Apply topically 2 times daily. vitamin B-12 (CYANOCOBALAMIN) 1000 MCG tablet  Take 1,000 mcg by mouth daily                    Medications marked \"taking\" at this time  Outpatient Medications Marked as Taking for the 10/22/20 encounter (Office Visit) with Jolynn Bennett MD   Medication Sig Dispense Refill    ondansetron (ZOFRAN) 4 MG tablet Take 1 tablet by mouth every 8 hours as needed for Nausea or Vomiting 30 tablet 3    clopidogrel (PLAVIX) 75 MG tablet Take 75 mg by mouth daily       pantoprazole (PROTONIX) 40 MG tablet Take 40 mg by mouth daily       triamcinolone (KENALOG) 0.025 % cream Apply topically 2 times daily. (Patient taking differently: Apply 0.025 % topically Apply topically 2 times daily. ) 1 Tube 1    rosuvastatin (CRESTOR) 5 MG tablet Take 1 tablet by mouth daily 30 tablet 0    coenzyme Q-10 100 MG capsule Take 1 capsule by mouth daily 30 capsule 3    levothyroxine (SYNTHROID) 100 MCG tablet TAKE 1 TABLET DAILY (Patient taking differently: Take 100 mcg by mouth Daily ) 90 tablet 4    amLODIPine (NORVASC) 5 MG tablet daily       carvedilol (COREG) 3.125 MG tablet Take 3.125 mg by mouth 2 times daily       nitroGLYCERIN (NITROSTAT) 0.4 MG SL tablet Place 1 tablet under the tongue every 5 minutes as needed for Chest pain up to max of 3 total doses. If no relief after 1 dose, call 911. 25 tablet 3    aspirin 81 MG tablet Take 81 mg by mouth daily.       vitamin B-12 (CYANOCOBALAMIN) 1000 MCG tablet Take 1,000 mcg by mouth daily           Medications patient taking as of now reconciled against medications ordered at time of hospital discharge: Yes    Chief Complaint   Patient presents with   [address] from Hospital     Pt. is here for an ER f/u from [facility] from 09/12/2020-09/14/2020 Heart sounds: Normal heart sounds. No murmur. No friction rub. No gallop. Pulmonary:      Effort: Pulmonary effort is normal. No respiratory distress. Breath sounds: Normal breath sounds. No wheezing. Abdominal:      General: Bowel sounds are normal. There is no distension. Palpations: Abdomen is soft. There is no mass. Tenderness: There is no abdominal tenderness. There is no guarding or rebound. Hernia: No hernia is present. Genitourinary:     Penis: Normal.    Musculoskeletal: Normal range of motion. General: No tenderness. Lymphadenopathy:      Cervical: No cervical adenopathy. Skin:     General: Skin is warm and dry. Neurological:      Mental Status: He is alert and oriented to person, place, and time. Cranial Nerves: No cranial nerve deficit. Coordination: Coordination normal.             Assessment/Plan:   Diagnosis Orders   1. Chest pain, unspecified type  [place] DISCHARGE MEDS RECONCILED W/ CURRENT OUTPATIENT MED LIST   2. SOB (shortness of breath)  [place] DISCHARGE MEDS RECONCILED W/ CURRENT OUTPATIENT MED LIST   3. Tic douloureux  [place] DISCHARGE MEDS RECONCILED W/ CURRENT OUTPATIENT MED LIST   4.  Hypothyroidism, unspecified type  [place] DISCHARGE MEDS RECONCILED W/ CURRENT OUTPATIENT MED LIST         Medical Decision Making: moderate complexity

## 2020-10-26 DIAGNOSIS — M35.3 PMR (POLYMYALGIA RHEUMATICA) (HCC): ICD-10-CM

## 2020-10-26 LAB — SEDIMENTATION RATE, ERYTHROCYTE: 11 MM (ref 0–20)

## 2020-12-14 RX ORDER — LEVOTHYROXINE SODIUM 0.1 MG/1
TABLET ORAL
Qty: 90 TABLET | Refills: 3 | Status: SHIPPED | OUTPATIENT
Start: 2020-12-14 | End: 2022-01-17

## 2021-03-15 LAB
ALBUMIN SERPL-MCNC: 4.4 G/DL
ALP BLD-CCNC: 78 U/L
ALT SERPL-CCNC: 20 U/L
ANION GAP SERPL CALCULATED.3IONS-SCNC: NORMAL MMOL/L
AST SERPL-CCNC: 20 U/L
AVERAGE GLUCOSE: NORMAL
BASOPHILS ABSOLUTE: 32 /ΜL
BASOPHILS RELATIVE PERCENT: 0.6 %
BILIRUB SERPL-MCNC: 0.5 MG/DL (ref 0.1–1.4)
BILIRUBIN, URINE: NEGATIVE
BLOOD, URINE: NEGATIVE
BUN BLDV-MCNC: 17 MG/DL
C-REACTIVE PROTEIN: 0.4
CALCIUM SERPL-MCNC: 9.4 MG/DL
CHLORIDE BLD-SCNC: 104 MMOL/L
CLARITY: CLEAR
CO2: 26 MMOL/L
COLOR: YELLOW
CREAT SERPL-MCNC: 1.03 MG/DL
EOSINOPHILS ABSOLUTE: 189 /ΜL
EOSINOPHILS RELATIVE PERCENT: 3.5 %
GFR CALCULATED: 72
GLUCOSE BLD-MCNC: 107 MG/DL
GLUCOSE URINE: NORMAL
HBA1C MFR BLD: 5.7 %
HCT VFR BLD CALC: 35.5 % (ref 41–53)
HEMOGLOBIN: 12.3 G/DL (ref 13.5–17.5)
KETONES, URINE: NEGATIVE
LEUKOCYTE ESTERASE, URINE: NEGATIVE
LYMPHOCYTES ABSOLUTE: 1382 /ΜL
LYMPHOCYTES RELATIVE PERCENT: 25.6 %
MCH RBC QN AUTO: 30.7 PG
MCHC RBC AUTO-ENTMCNC: 34.6 G/DL
MCV RBC AUTO: 88.5 FL
MONOCYTES ABSOLUTE: 664 /ΜL
MONOCYTES RELATIVE PERCENT: 12.3 %
NEUTROPHILS ABSOLUTE: 3121 /ΜL
NEUTROPHILS RELATIVE PERCENT: 58 %
NITRITE, URINE: NEGATIVE
PH UA: 5 (ref 4.5–8)
PLATELET # BLD: 267 K/ΜL
PMV BLD AUTO: 9.6 FL
POTASSIUM SERPL-SCNC: 4.5 MMOL/L
PROTEIN UA: NEGATIVE
RBC # BLD: 4.01 10^6/ΜL
RHEUMATOID FACTOR: <14
SODIUM BLD-SCNC: 139 MMOL/L
SPECIFIC GRAVITY, URINE: 1.02
T3 FREE: 3
T4 FREE: 1.2
TOTAL PROTEIN: 7.2
TSH SERPL DL<=0.05 MIU/L-ACNC: 2.28 UIU/ML
URIC ACID: 5.7
UROBILINOGEN, URINE: NORMAL
VITAMIN B-12: 1058
WBC # BLD: 5.4 10^3/ML

## 2021-05-19 LAB
CHOLESTEROL, TOTAL: 153 MG/DL (ref 0–199)
HCT VFR BLD CALC: 35.4 % (ref 42–52)
HDLC SERPL-MCNC: 32 MG/DL (ref 40–59)
HEMOGLOBIN: 12 G/DL (ref 14–18)
LDL CHOLESTEROL CALCULATED: 101 MG/DL (ref 0–129)
MCH RBC QN AUTO: 30.9 PG (ref 27–31.3)
MCHC RBC AUTO-ENTMCNC: 33.9 % (ref 33–37)
MCV RBC AUTO: 91.1 FL (ref 80–100)
PDW BLD-RTO: 13.1 % (ref 11.5–14.5)
PLATELET # BLD: 259 K/UL (ref 130–400)
RBC # BLD: 3.89 M/UL (ref 4.7–6.1)
TRIGL SERPL-MCNC: 99 MG/DL (ref 0–150)
WBC # BLD: 5.5 K/UL (ref 4.8–10.8)

## 2021-06-07 ENCOUNTER — OFFICE VISIT (OUTPATIENT)
Dept: PULMONOLOGY | Age: 71
End: 2021-06-07
Payer: MEDICARE

## 2021-06-07 VITALS
BODY MASS INDEX: 27.01 KG/M2 | WEIGHT: 221.8 LBS | RESPIRATION RATE: 16 BRPM | SYSTOLIC BLOOD PRESSURE: 136 MMHG | DIASTOLIC BLOOD PRESSURE: 72 MMHG | OXYGEN SATURATION: 99 % | HEIGHT: 76 IN | TEMPERATURE: 96.7 F | HEART RATE: 54 BPM

## 2021-06-07 DIAGNOSIS — J84.10 GRANULOMATOUS LUNG DISEASE (HCC): ICD-10-CM

## 2021-06-07 DIAGNOSIS — I51.89 DIASTOLIC DYSFUNCTION: ICD-10-CM

## 2021-06-07 DIAGNOSIS — R06.09 DOE (DYSPNEA ON EXERTION): Primary | ICD-10-CM

## 2021-06-07 DIAGNOSIS — J43.2 CENTRILOBULAR EMPHYSEMA (HCC): ICD-10-CM

## 2021-06-07 DIAGNOSIS — I27.20 PULMONARY HYPERTENSION (HCC): ICD-10-CM

## 2021-06-07 PROCEDURE — 99214 OFFICE O/P EST MOD 30 MIN: CPT | Performed by: INTERNAL MEDICINE

## 2021-06-07 RX ORDER — BEMPEDOIC ACID 180 MG/1
TABLET, FILM COATED ORAL
COMMUNITY
Start: 2021-04-15

## 2021-06-07 RX ORDER — OMEPRAZOLE 20 MG/1
CAPSULE, DELAYED RELEASE ORAL
COMMUNITY
Start: 2021-04-13 | End: 2021-06-09

## 2021-06-07 NOTE — PROGRESS NOTES
Subjective:     Reno Jimenez is a 65 y.o. male who complains today of:     Chief Complaint   Patient presents with    Follow-up     Lung Granuloma       HPI  Patient presents for dyspnea on exertion, patient came back due to symptoms of dyspnea on exertion mainly when he walks with incline otherwise walking on flat surface he does not get short of breath, however he does report also symptoms of weakness in his lower extremities with walking and exertion, he was tested for peripheral vascular disease by cardiology and that was negative, he denies restless leg symptoms, his weight is stable, no coughing, no chest pain, no shortness of breath at rest, no wheezing, no waking up at night coughing or wheezing, he has no history of sleep apnea, he had a CAT scan done recently at [facility] December 2020 it showed emphysema with chronic granulomatous disease he is concerned about the emphysema finding in his CT scan I did try to look at the CD myself however its not working. Allergies:   Other and Propofol  Past Medical History:   Diagnosis Date    CAD (coronary artery disease)     Congenital heart disease     Coronary artery disease     Heart attack ([facility] 10/30/2017    Hyperlipidemia     Hypertension     Hypothyroidism     Knee pain     MI (myocardial infarction) ([facility] [address] 2008     Past Surgical History:   Procedure Laterality Date    CARDIAC CATHETERIZATION  05/13/16    DR Bette Coulter    CORONARY ANGIOPLASTY WITH STENT PLACEMENT  2008    VASECTOMY       Family History   Problem Relation Age of Onset    Heart Disease Father     Coronary Art Dis Father      Social History     Socioeconomic History    Marital status:      Spouse name: Not on file    Number of children: Not on file    Years of education: Not on file    Highest education level: Not on file   Occupational History    Not on file   Tobacco Use    Smoking status: Former Smoker     Packs/day: 1.50     Years: 20.00     Pack years: 30.00     Types: Cigarettes     Quit date: 18     Years since quittin.4    Smokeless tobacco: Never Used   Vaping Use    Vaping Use: Never used   Substance and Sexual Activity    Alcohol use: Yes     Alcohol/week: 2.0 standard drinks     Types: 1 Glasses of wine, 1 Cans of beer per week     Comment: rarely     Drug use: No    Sexual activity: Yes     Partners: Female   Other Topics Concern    Not on file   Social History Narrative    Not on file     Social Determinants of Health     Financial Resource Strain: Low Risk     Difficulty of Paying Living Expenses: Not hard at all   Food Insecurity: No Food Insecurity    Worried About Running Out of Food in the Last Year: Never true    [address] in the Last Year: Never true   Transportation Needs: No Transportation Needs    Lack of Transportation (Medical): No    Lack of Transportation (Non-Medical): No   Physical Activity:     Days of Exercise per Week:     Minutes of Exercise per Session:    Stress:     Feeling of Stress :    Social Connections:     Frequency of Communication with Friends and Family:     Frequency of Social Gatherings with Friends and Family:     Attends Christian Services:     Active Member of Clubs or Organizations:     Attends Club or Organization Meetings:     Marital Status:    Intimate Partner Violence:     Fear of Current or Ex-Partner:     Emotionally Abused:     Physically Abused:     Sexually Abused:          Review of Systems      ROS: 10 organs review of system is done including general, psychological, ENT, hematological, endocrine, respiratory, cardiovascular, gastrointestinal,musculoskeletal, neurological,  allergy and Immunology is done and is otherwise negative.     Current Outpatient Medications   Medication Sig Dispense Refill    Multiple Vitamin (MULTIVITAMIN ADULT PO) Take by mouth      NEXLETOL 180 MG TABS       omeprazole (PRILOSEC) 20 MG delayed release capsule       and regular rhythm. Heart sounds: Normal heart sounds. No murmur heard. No friction rub. No gallop. Pulmonary:      Effort: Pulmonary effort is normal. No respiratory distress. Breath sounds: Normal breath sounds. No wheezing or rales. Chest:      Chest wall: No tenderness. Abdominal:      General: Bowel sounds are normal.      Palpations: Abdomen is soft. Musculoskeletal:         General: No tenderness or deformity. Cervical back: Normal range of motion and neck supple. Right lower leg: No edema. Left lower leg: No edema. Skin:     General: Skin is warm and dry. Neurological:      Mental Status: He is alert and oriented to person, place, and time. Psychiatric:         Judgment: Judgment normal.         Imaging studies reviewed by me outside CD of the CT chest reviewed by me, shows emphysema, otherwise stable coronary disease  Lab results reviewed in chart  PFT 2019 shows normal spirometry and lung volumes, slightly decreased DLCO  ECHO: 2017 shows EF 11%, diastolic dysfunction, and RVSP 37    Assessment and Plan       Diagnosis Orders   1. HAMPTON (dyspnea on exertion)  Full PFT Study With Bronchodilator   2. Centrilobular emphysema ([address]     3. Diastolic dysfunction     4. Pulmonary hypertension ([address]     5.  Granulomatous lung disease (HCC)       · Dyspnea exertion, most probably related to underlying diastolic dysfunction and mild pulmonary hypertension, centrilobular emphysema is contributing factor however I do not believe it is a major issue in light of normal lung volumes and spirometry in the past, will obtain PFT  · Centrilobular emphysema, mild and stable  · Diastolic dysfunction, continue cardioprotective medications and avoid volume overload  · Pulmonary hypertension, likely group 2  · Granulomatous  lung disease, stable      Orders Placed This Encounter   Procedures    Full PFT Study With Bronchodilator     Standing Status:   Future     Standing Expiration Date: 12/7/2022     No orders of the defined types were placed in this encounter. Discussed with patient the importance of exercise and weight control and  overall health and well-being. Reviewed with the patient: current clinical status, medications, activities and diet. Side effects, adverse effects of the medication prescribed today, as well as treatment plan and result expectations have been discussed with the patient who expresses understanding and desires to proceed. Return in about 4 weeks (around 7/5/2021).       Kip Shah MD

## 2021-06-09 ENCOUNTER — OFFICE VISIT (OUTPATIENT)
Dept: FAMILY MEDICINE CLINIC | Age: 71
End: 2021-06-09
Payer: MEDICARE

## 2021-06-09 VITALS
HEART RATE: 59 BPM | WEIGHT: 219 LBS | OXYGEN SATURATION: 97 % | DIASTOLIC BLOOD PRESSURE: 66 MMHG | BODY MASS INDEX: 26.67 KG/M2 | TEMPERATURE: 98.1 F | HEIGHT: 76 IN | SYSTOLIC BLOOD PRESSURE: 130 MMHG

## 2021-06-09 DIAGNOSIS — M35.3 PMR (POLYMYALGIA RHEUMATICA) (HCC): ICD-10-CM

## 2021-06-09 DIAGNOSIS — E55.9 VITAMIN D DEFICIENCY: ICD-10-CM

## 2021-06-09 DIAGNOSIS — M79.10 MUSCLE PAIN: Primary | ICD-10-CM

## 2021-06-09 LAB — VITAMIN D 25-HYDROXY: 47.6 NG/ML (ref 30–100)

## 2021-06-09 PROCEDURE — 99213 OFFICE O/P EST LOW 20 MIN: CPT | Performed by: FAMILY MEDICINE

## 2021-06-09 RX ORDER — NAPHAZOLINE HYDROCHLORIDE AND PHENIRAMINE MALEATE .25; 3 MG/ML; MG/ML
1 SOLUTION/ DROPS OPHTHALMIC 4 TIMES DAILY
Qty: 1 BOTTLE | Refills: 3 | Status: SHIPPED | OUTPATIENT
Start: 2021-06-09

## 2021-06-09 ASSESSMENT — PATIENT HEALTH QUESTIONNAIRE - PHQ9
1. LITTLE INTEREST OR PLEASURE IN DOING THINGS: 0
SUM OF ALL RESPONSES TO PHQ QUESTIONS 1-9: 0
SUM OF ALL RESPONSES TO PHQ QUESTIONS 1-9: 0
2. FEELING DOWN, DEPRESSED OR HOPELESS: 0
SUM OF ALL RESPONSES TO PHQ QUESTIONS 1-9: 0
SUM OF ALL RESPONSES TO PHQ9 QUESTIONS 1 & 2: 0

## 2021-06-09 ASSESSMENT — ENCOUNTER SYMPTOMS
ALLERGIC/IMMUNOLOGIC NEGATIVE: 1
SHORTNESS OF BREATH: 0
EYES NEGATIVE: 1
GASTROINTESTINAL NEGATIVE: 1
RESPIRATORY NEGATIVE: 1

## 2021-06-09 NOTE — PROGRESS NOTES
Alcohol use: Yes     Alcohol/week: 2.0 standard drinks     Types: 1 Glasses of wine, 1 Cans of beer per week     Comment: rarely     Drug use: No    Sexual activity: Yes     Partners: Female   Other Topics Concern    None   Social History Narrative    None     Social Determinants of Health     Financial Resource Strain: Low Risk     Difficulty of Paying Living Expenses: Not hard at all   Food Insecurity: No Food Insecurity    Worried About Running Out of Food in the Last Year: Never true    Elaine of Food in the Last Year: Never true   Transportation Needs: No Transportation Needs    Lack of Transportation (Medical): No    Lack of Transportation (Non-Medical): No   Physical Activity:     Days of Exercise per Week:     Minutes of Exercise per Session:    Stress:     Feeling of Stress :    Social Connections:     Frequency of Communication with Friends and Family:     Frequency of Social Gatherings with Friends and Family:     Attends Oriental orthodox Services:     Active Member of Clubs or Organizations:     Attends Club or Organization Meetings:     Marital Status:    Intimate Partner Violence:     Fear of Current or Ex-Partner:     Emotionally Abused:     Physically Abused:     Sexually Abused:      Current Outpatient Medications   Medication Sig Dispense Refill    naphazoline-pheniramine (NAPHCON-A) 0.025-0.3 % ophthalmic solution Place 1 drop into both eyes 4 times daily 1 Bottle 3    Multiple Vitamin (MULTIVITAMIN ADULT PO) Take by mouth      levothyroxine (SYNTHROID) 100 MCG tablet TAKE 1 TABLET DAILY 90 tablet 3    clopidogrel (PLAVIX) 75 MG tablet Take 75 mg by mouth daily       amLODIPine (NORVASC) 5 MG tablet daily       carvedilol (COREG) 3.125 MG tablet Take 3.125 mg by mouth 2 times daily       nitroGLYCERIN (NITROSTAT) 0.4 MG SL tablet Place 1 tablet under the tongue every 5 minutes as needed for Chest pain up to max of 3 total doses.  If no relief after 1 dose, call 911. 25 tablet 3    aspirin 81 MG tablet Take 81 mg by mouth daily.  vitamin B-12 (CYANOCOBALAMIN) 1000 MCG tablet Take 1,000 mcg by mouth daily       NEXLETOL 180 MG TABS  (Patient not taking: Reported on 6/9/2021)       No current facility-administered medications for this visit. Current Outpatient Medications on File Prior to Visit   Medication Sig Dispense Refill    Multiple Vitamin (MULTIVITAMIN ADULT PO) Take by mouth      levothyroxine (SYNTHROID) 100 MCG tablet TAKE 1 TABLET DAILY 90 tablet 3    clopidogrel (PLAVIX) 75 MG tablet Take 75 mg by mouth daily       amLODIPine (NORVASC) 5 MG tablet daily       carvedilol (COREG) 3.125 MG tablet Take 3.125 mg by mouth 2 times daily       nitroGLYCERIN (NITROSTAT) 0.4 MG SL tablet Place 1 tablet under the tongue every 5 minutes as needed for Chest pain up to max of 3 total doses. If no relief after 1 dose, call 911. 25 tablet 3    aspirin 81 MG tablet Take 81 mg by mouth daily.  vitamin B-12 (CYANOCOBALAMIN) 1000 MCG tablet Take 1,000 mcg by mouth daily       NEXLETOL 180 MG TABS  (Patient not taking: Reported on 6/9/2021)       No current facility-administered medications on file prior to visit.      Allergies   Allergen Reactions    Other Other (See Comments)     Leg cramps    Propofol Rash     Health Maintenance   Topic Date Due    Annual Wellness Visit (AWV)  Never done    TSH testing  09/14/2021    Colon cancer screen colonoscopy  02/18/2025    Lipid screen  05/19/2026    DTaP/Tdap/Td vaccine (2 - Td or Tdap) 08/15/2028    Flu vaccine  Completed    Shingles Vaccine  Completed    Pneumococcal 65+ years Vaccine  Completed    COVID-19 Vaccine  Completed    AAA screen  Completed    Hepatitis C screen  Completed    Hepatitis A vaccine  Aged Out    Hepatitis B vaccine  Aged Out    Hib vaccine  Aged Out    Meningococcal (ACWY) vaccine  Aged Out       Review of Systems     Review of Systems   Constitutional: Negative for activity change, appetite change, chills, fever and unexpected weight change. HENT: Negative. Eyes: Negative. Respiratory: Negative. Negative for shortness of breath. Cardiovascular: Negative. Negative for chest pain and palpitations. Gastrointestinal: Negative. Endocrine: Negative. Genitourinary: Negative. Musculoskeletal: Negative. Skin: Negative. Allergic/Immunologic: Negative. Neurological: Negative. Hematological: Negative. Psychiatric/Behavioral: Negative. Physical Exam  Vitals:    06/09/21 1152   BP: 130/66   Site: Left Upper Arm   Position: Sitting   Cuff Size: Large Adult   Pulse: 59   Temp: 98.1 °F (36.7 °C)   TempSrc: Oral   SpO2: 97%   Weight: 219 lb (99.3 kg)   Height: 6' 4\" (1.93 m)       Physical Exam  Constitutional:       Appearance: He is well-developed. HENT:      Right Ear: External ear normal.      Left Ear: External ear normal.   Eyes:      Conjunctiva/sclera: Conjunctivae normal.      Pupils: Pupils are equal, round, and reactive to light. Neck:      Thyroid: No thyromegaly. Cardiovascular:      Rate and Rhythm: Normal rate and regular rhythm. Heart sounds: Normal heart sounds. No murmur heard. No friction rub. No gallop. Pulmonary:      Effort: Pulmonary effort is normal. No respiratory distress. Breath sounds: Normal breath sounds. No wheezing. Abdominal:      General: Bowel sounds are normal. There is no distension. Palpations: Abdomen is soft. There is no mass. Tenderness: There is no abdominal tenderness. There is no guarding or rebound. Hernia: No hernia is present. Genitourinary:     Penis: Normal.    Musculoskeletal:         General: No tenderness. Normal range of motion. Cervical back: Normal range of motion and neck supple. Lymphadenopathy:      Cervical: No cervical adenopathy. Skin:     General: Skin is warm and dry. Neurological:      Mental Status: He is alert and oriented to person, place, and time. Cranial Nerves: No cranial nerve deficit. Coordination: Coordination normal.         Assessment   Diagnosis Orders   1. Muscle pain     2. PMR (polymyalgia rheumatica) ([facility])     3. Vitamin D deficiency  Vitamin D 25 Hydroxy     Problem List     PMR ([facility]ymyalgia rheumatica) [facility]          Plan  Orders Placed This Encounter   Procedures    Vitamin D 25 Hydroxy     Standing Status:   Future     Number of Occurrences:   1     Standing Expiration Date:   6/9/2022     Orders Placed This Encounter   Medications    naphazoline-pheniramine (NAPHCON-A) 0.025-0.3 % ophthalmic solution     Sig: Place 1 drop into both eyes 4 times daily     Dispense:  1 Bottle     Refill:  3     No follow-ups on file.   Shasha Dominguez MD

## 2021-06-16 ENCOUNTER — HOSPITAL ENCOUNTER (OUTPATIENT)
Dept: PULMONOLOGY | Age: 71
Discharge: HOME | End: 2021-06-16
Payer: MEDICARE

## 2021-06-16 DIAGNOSIS — R06.09 DOE (DYSPNEA ON EXERTION): ICD-10-CM

## 2021-06-16 PROCEDURE — 94729 DIFFUSING CAPACITY: CPT

## 2021-06-16 PROCEDURE — 94726 PLETHYSMOGRAPHY LUNG VOLUMES: CPT | Performed by: INTERNAL MEDICINE

## 2021-06-16 PROCEDURE — 94729 DIFFUSING CAPACITY: CPT | Performed by: INTERNAL MEDICINE

## 2021-06-16 PROCEDURE — 94010 BREATHING CAPACITY TEST: CPT | Performed by: INTERNAL MEDICINE

## 2021-06-16 PROCEDURE — 94010 BREATHING CAPACITY TEST: CPT

## 2021-06-16 PROCEDURE — 94726 PLETHYSMOGRAPHY LUNG VOLUMES: CPT

## 2021-06-17 ENCOUNTER — TELEPHONE (OUTPATIENT)
Dept: PULMONOLOGY | Age: 71
End: 2021-06-17

## 2021-07-01 ENCOUNTER — TELEPHONE (OUTPATIENT)
Dept: PRIMARY CARE CLINIC | Age: 71
End: 2021-07-01

## 2021-07-01 NOTE — TELEPHONE ENCOUNTER
Spoke to patient concerning AWV, he declined for today but would like a call back Friday to complete.

## 2021-07-14 ENCOUNTER — OFFICE VISIT (OUTPATIENT)
Dept: PULMONOLOGY | Age: 71
End: 2021-07-14
Payer: MEDICARE

## 2021-07-14 VITALS
WEIGHT: 219 LBS | SYSTOLIC BLOOD PRESSURE: 120 MMHG | OXYGEN SATURATION: 98 % | TEMPERATURE: 96.1 F | HEART RATE: 53 BPM | DIASTOLIC BLOOD PRESSURE: 62 MMHG | RESPIRATION RATE: 16 BRPM | HEIGHT: 76 IN | BODY MASS INDEX: 26.67 KG/M2

## 2021-07-14 DIAGNOSIS — J84.10 GRANULOMATOUS LUNG DISEASE (HCC): ICD-10-CM

## 2021-07-14 DIAGNOSIS — I27.20 PULMONARY HYPERTENSION (HCC): ICD-10-CM

## 2021-07-14 DIAGNOSIS — I51.89 DIASTOLIC DYSFUNCTION: ICD-10-CM

## 2021-07-14 DIAGNOSIS — J43.2 CENTRILOBULAR EMPHYSEMA (HCC): ICD-10-CM

## 2021-07-14 DIAGNOSIS — R53.1 WEAKNESS: Primary | ICD-10-CM

## 2021-07-14 PROCEDURE — 99213 OFFICE O/P EST LOW 20 MIN: CPT | Performed by: INTERNAL MEDICINE

## 2021-07-14 NOTE — PROGRESS NOTES
Subjective:     Jony Saleem is a 65 y.o. male who complains today of:     Chief Complaint   Patient presents with    Follow-up     4 Week F/U for Dyspnea on exertion    Results     PFT       HPI  Patient presents for shortness of breath    Patient presents for evaluation of shortness of breath, he feels better actually his dyspnea is mainly exertional and he reports that it happens in association with weakness in his legs, he reports getting tired shortly after starting biking, he does report also weakness in proximal muscles when he does exercises that involve upper extremities, otherwise no coughing, no chest pain, no shortness of breath, no fever no chills, no lower extremity edema, sleeps well, no nasal congestion or postnasal drip, and no heartburn. Weight is stable. Allergies:   Other and Propofol  Past Medical History:   Diagnosis Date    CAD (coronary artery disease)     Congenital heart disease     Coronary artery disease     Heart attack ([facility] 10/30/2017    Hyperlipidemia     Hypertension     Hypothyroidism     Knee pain     MI (myocardial infarction) ([facility]      Past Surgical History:   Procedure Laterality Date    CARDIAC CATHETERIZATION  16    DR Rui Neal    CORONARY ANGIOPLASTY WITH STENT PLACEMENT  2008    VASECTOMY       Family History   Problem Relation Age of Onset    Heart Disease Father     Coronary Art Dis Father      Social History     Socioeconomic History    Marital status:      Spouse name: Not on file    Number of children: Not on file    Years of education: Not on file    Highest education level: Not on file   Occupational History    Not on file   Tobacco Use    Smoking status: Former Smoker     Packs/day: 1.50     Years: 20.00     Pack years: 30.00     Types: Cigarettes     Quit date:      Years since quittin.5    Smokeless tobacco: Never Used   Vaping Use    Vaping Use: Never used   Substance and Sexual Activity    Alcohol use: Yes     Alcohol/week: 2.0 standard drinks     Types: 1 Glasses of wine, 1 Cans of beer per week     Comment: rarely     Drug use: No    Sexual activity: Yes     Partners: Female   Other Topics Concern    Not on file   Social History Narrative    Not on file     Social Determinants of Health     Financial Resource Strain: Low Risk     Difficulty of Paying Living Expenses: Not hard at all   Food Insecurity: No Food Insecurity    Worried About Running Out of Food in the Last Year: Never true    [address] in the Last Year: Never true   Transportation Needs: No Transportation Needs    Lack of Transportation (Medical): No    Lack of Transportation (Non-Medical): No   Physical Activity:     Days of Exercise per Week:     Minutes of Exercise per Session:    Stress:     Feeling of Stress :    Social Connections:     Frequency of Communication with Friends and Family:     Frequency of Social Gatherings with Friends and Family:     Attends Sikhism Services:     Active Member of Clubs or Organizations:     Attends Club or Organization Meetings:     Marital Status:    Intimate Partner Violence:     Fear of Current or Ex-Partner:     Emotionally Abused:     Physically Abused:     Sexually Abused:          Review of Systems      ROS: 10 organs review of system is done including general, psychological, ENT, hematological, endocrine, respiratory, cardiovascular, gastrointestinal,musculoskeletal, neurological,  allergy and Immunology is done and is otherwise negative.     Current Outpatient Medications   Medication Sig Dispense Refill    naphazoline-pheniramine (NAPHCON-A) 0.025-0.3 % ophthalmic solution Place 1 drop into both eyes 4 times daily 1 Bottle 3    Multiple Vitamin (MULTIVITAMIN ADULT PO) Take by mouth      NEXLETOL 180 MG TABS       levothyroxine (SYNTHROID) 100 MCG tablet TAKE 1 TABLET DAILY 90 tablet 3    clopidogrel (PLAVIX) 75 MG tablet Take 75 mg by mouth daily       amLODIPine (NORVASC) 5 MG tablet daily       carvedilol (COREG) 3.125 MG tablet Take 3.125 mg by mouth 2 times daily       nitroGLYCERIN (NITROSTAT) 0.4 MG SL tablet Place 1 tablet under the tongue every 5 minutes as needed for Chest pain up to max of 3 total doses. If no relief after 1 dose, call 911. 25 tablet 3    aspirin 81 MG tablet Take 81 mg by mouth daily.  vitamin B-12 (CYANOCOBALAMIN) 1000 MCG tablet Take 1,000 mcg by mouth daily        No current facility-administered medications for this visit. Objective:     Vitals:    07/14/21 1311   BP: 120/62   Site: Left Upper Arm   Position: Sitting   Cuff Size: Medium Adult   Pulse: 53   Resp: 16   Temp: 96.1 °F (35.6 °C)   TempSrc: Tympanic   SpO2: 98%   Weight: 219 lb (99.3 kg)   Height: 6' 4\" (1.93 m)         Physical Exam  Constitutional:       Appearance: He is well-developed. HENT:      Head: Normocephalic and atraumatic. Eyes:      General:         Left eye: No discharge. Conjunctiva/sclera: Conjunctivae normal.      Pupils: Pupils are equal, round, and reactive to light. Neck:      Vascular: No JVD. Cardiovascular:      Rate and Rhythm: Normal rate and regular rhythm. Heart sounds: Normal heart sounds. No murmur heard. No friction rub. No gallop. Pulmonary:      Effort: Pulmonary effort is normal. No respiratory distress. Breath sounds: Normal breath sounds. No wheezing or rales. Chest:      Chest wall: No tenderness. Abdominal:      General: Bowel sounds are normal.      Palpations: Abdomen is soft. Musculoskeletal:         General: No tenderness or deformity. Cervical back: Normal range of motion and neck supple. Right lower leg: No edema. Left lower leg: No edema. Skin:     General: Skin is warm and dry. Neurological:      Mental Status: He is alert and oriented to person, place, and time.    Psychiatric:         Judgment: Judgment normal.         Imaging studies reviewed by me chest x-ray is unremarkable  Lab results reviewed in chart  PFT PFT is normal  ECHO: 2017 shows EF 70% with diastolic dysfunction, RVSP 37    Assessment and Plan       Diagnosis Orders   1. Weakness  AFL - Temo Baig MD, Neurology, [place]   2. Centrilobular emphysema ([facility]     3. Diastolic dysfunction     4. Pulmonary hypertension ([facility]     5. Granulomatous lung disease ([facility])       · Proximal muscle weakness, worse with activity, concerning for myasthenia gravis, will refer patient to neurology for further evaluation,   · Centrilobular emphysema, mild, PFT is normal, no COPD on exam, DLCO is normal not likely to be a factor in patient's symptoms of dyspnea on exertion  · Diastolic dysfunction, continue cardioprotective medications and avoid volume overload  · Pulmonary hypertension, group 2, and treatment mainly treat underlying disease  · Granulomatous lung disease, currently not active issue. Patient will follow up with me as needed. Orders Placed This Encounter   Procedures   Hilary Alanis MD, Neurology, [place]     Referral Priority:   Routine     Referral Type:   Eval and Treat     Referral Reason:   Specialty Services Required     Referred to Provider:   Chau Dhaliwal MD     Requested Specialty:   Neurology     Number of Visits Requested:   1     No orders of the defined types were placed in this encounter. Discussed with patient the importance of exercise and weight control and  overall health and well-being. Reviewed with the patient: current clinical status, medications, activities and diet. Side effects, adverse effects of the medication prescribed today, as well as treatment plan and result expectations have been discussed with the patient who expresses understanding and desires to proceed. Return if symptoms worsen or fail to improve.       Astrid Rodarte MD

## 2021-08-27 ENCOUNTER — HOSPITAL ENCOUNTER (OUTPATIENT)
Dept: NEUROLOGY | Age: 71
Discharge: HOME | End: 2021-08-27
Payer: MEDICARE

## 2021-08-27 DIAGNOSIS — G58.7 MONONEURITIS MULTIPLEX: ICD-10-CM

## 2021-08-27 PROCEDURE — 95911 NRV CNDJ TEST 9-10 STUDIES: CPT

## 2021-08-27 PROCEDURE — 95886 MUSC TEST DONE W/N TEST COMP: CPT

## 2021-08-27 NOTE — PROCEDURES
[address]                      [address], [address] [facility]                             ELECTROMYOGRAM REPORT    PATIENT NAME: Emmy Taveras                    :        1950  MED REC NO:   [id number]                            ROOM:  ACCOUNT NO:   [de-identified]                           ADMIT DATE: 2021  PROVIDER:     Timi Pitts MD    DATE OF EM2021    REASON FOR STUDY:  The patient was having weakness in legs, especially  going up stairs. His symptoms have been getting worse for the last two years. He had a  history of back pain in the past.    Motor nerve conduction velocities are mildly slowed in the right common  peroneal nerve and normal in all the nerves tested. F-wave latencies are delayed in all the nerves tested. Distal motor latencies are normal in all the nerves tested. Distal sensory latencies are mildly delayed in the right sural nerve,  normal in the left sural nerve, and could not be obtained in the  superficial peroneal nerves. Amplitude of motor and sensory responses are decreased in all the nerves  tested. On the concentric needle electrode examination, denervation changes are  present in the L4-5 root distribution bilaterally, being much worse on  the right side. CLINICAL INTERPRETATION:  EMG studies are showing changes of right  L4, L5 radiculopathy with mild involvement on the left side. The patient could be tried on conservative management with physical  therapy, analgesics, etc.      Due to continued symptoms, imaging of her umbar canal may be done to look for compromise of the spinal canal  and/or foramina. The delayed F-wave latencies, mild slowing of the motor nerve conduction  velocity in the right common peroneal nerve, some delay in the sensory  latencies in the right sural nerve are suggestive of peripheral  neuropathic process.     The patient could be screened for causes of peripheral

## 2021-10-11 ENCOUNTER — TELEPHONE (OUTPATIENT)
Dept: FAMILY MEDICINE CLINIC | Age: 71
End: 2021-10-11

## 2021-10-14 ENCOUNTER — HOSPITAL ENCOUNTER (OUTPATIENT)
Dept: MRI IMAGING | Age: 71
Discharge: HOME | End: 2021-10-16
Payer: MEDICARE

## 2021-10-14 DIAGNOSIS — R29.898 WEAKNESS OF LOWER EXTREMITY, UNSPECIFIED LATERALITY: ICD-10-CM

## 2021-10-14 PROCEDURE — 72148 MRI LUMBAR SPINE W/O DYE: CPT

## 2022-01-18 RX ORDER — LEVOTHYROXINE SODIUM 0.1 MG/1
TABLET ORAL
Qty: 90 TABLET | Refills: 0 | Status: SHIPPED | OUTPATIENT
Start: 2022-01-18 | End: 2022-04-11

## 2022-02-15 NOTE — PROGRESS NOTES
Pt c/o 10 out of 10 cp radiating through to his back. O2 @ 2l nc placed and ntg 0.4 administered sl. After 5 minutes,  pain was totally relieved.  ecg obtained as well with copy to chart. Nsaids Pregnancy And Lactation Text: These medications are considered safe up to 30 weeks gestation. It is excreted in breast milk.

## 2022-04-11 RX ORDER — LEVOTHYROXINE SODIUM 0.1 MG/1
TABLET ORAL
Qty: 90 TABLET | Refills: 1 | Status: SHIPPED | OUTPATIENT
Start: 2022-04-11 | End: 2022-09-28

## 2022-04-13 ENCOUNTER — TELEMEDICINE (OUTPATIENT)
Dept: PRIMARY CARE CLINIC | Age: 72
End: 2022-04-13
Payer: MEDICARE

## 2022-04-13 DIAGNOSIS — Z00.00 MEDICARE ANNUAL WELLNESS VISIT, SUBSEQUENT: Primary | ICD-10-CM

## 2022-04-13 PROCEDURE — G0439 PPPS, SUBSEQ VISIT: HCPCS | Performed by: NURSE PRACTITIONER

## 2022-04-13 RX ORDER — EVOLOCUMAB 140 MG/ML
140 INJECTION, SOLUTION SUBCUTANEOUS
COMMUNITY
Start: 2022-02-09

## 2022-04-13 SDOH — ECONOMIC STABILITY: FOOD INSECURITY: WITHIN THE PAST 12 MONTHS, YOU WORRIED THAT YOUR FOOD WOULD RUN OUT BEFORE YOU GOT MONEY TO BUY MORE.: NEVER TRUE

## 2022-04-13 SDOH — ECONOMIC STABILITY: FOOD INSECURITY: WITHIN THE PAST 12 MONTHS, THE FOOD YOU BOUGHT JUST DIDN'T LAST AND YOU DIDN'T HAVE MONEY TO GET MORE.: NEVER TRUE

## 2022-04-13 ASSESSMENT — PATIENT HEALTH QUESTIONNAIRE - PHQ9
SUM OF ALL RESPONSES TO PHQ QUESTIONS 1-9: 0
SUM OF ALL RESPONSES TO PHQ9 QUESTIONS 1 & 2: 0
1. LITTLE INTEREST OR PLEASURE IN DOING THINGS: 0
2. FEELING DOWN, DEPRESSED OR HOPELESS: 0
SUM OF ALL RESPONSES TO PHQ QUESTIONS 1-9: 0

## 2022-04-13 ASSESSMENT — SOCIAL DETERMINANTS OF HEALTH (SDOH): HOW HARD IS IT FOR YOU TO PAY FOR THE VERY BASICS LIKE FOOD, HOUSING, MEDICAL CARE, AND HEATING?: NOT HARD AT ALL

## 2022-04-13 ASSESSMENT — LIFESTYLE VARIABLES
HOW OFTEN DO YOU HAVE A DRINK CONTAINING ALCOHOL: MONTHLY OR LESS
HOW MANY STANDARD DRINKS CONTAINING ALCOHOL DO YOU HAVE ON A TYPICAL DAY: 1 OR 2

## 2022-04-13 NOTE — PROGRESS NOTES
Medicare Annual Wellness Visit    Lowell Lyman is here for Medicare AWV    Assessment & Plan   Medicare annual wellness visit, subsequent      Recommendations for Preventive Services Due: see orders and patient instructions/AVS.  Recommended screening schedule for the next 5-10 years is provided to the patient in written form: see Patient Instructions/AVS.     Return for Medicare Annual Wellness Visit in 1 year. Subjective       Patient's complete Health Risk Assessment and screening values have been reviewed and are found in Flowsheets. The following problems were reviewed today and where indicated follow up appointments were made and/or referrals ordered. Positive Risk Factor Screenings with Interventions:             General Health and ACP:  General  In general, how would you say your health is?: Good  In the past 7 days, have you experienced any of the following: New or Increased Pain, New or Increased Fatigue, Loneliness, Social Isolation, Stress or Anger?: No  Do you get the social and emotional support that you need?: Yes  Do you have a Living Will?: (!) No    Advance Directives     Power of  Living Will ACP-Advance Directive ACP-Power of     Not on File Not on File Filed Not on File      General Health Risk Interventions:  · No Living Will: Patient declines ACP discussion/assistance              Objective      Patient-Reported Vitals  No data recorded            Allergies   Allergen Reactions    Other Other (See Comments)     Leg cramps    Propofol Rash     Prior to Visit Medications    Medication Sig Taking?  Authorizing Provider   MICA CHEEKCLICK 221 MG/ML SOAJ Inject 140 mg into the muscle every 3 months  Historical Provider, MD   levothyroxine (SYNTHROID) 100 MCG tablet TAKE 1 TABLET DAILY  Elizabeth Goldstein MD   naphazoline-pheniramine (NAPHCON-A) 0.025-0.3 % ophthalmic solution Place 1 drop into both eyes 4 times daily  Elizabeth Goldstein MD   Multiple Vitamin (MULTIVITAMIN ADULT PO) Take by mouth  Historical Provider, MD   NEXLETOL 180 MG TABS   Historical Provider, MD   clopidogrel (PLAVIX) 75 MG tablet Take 75 mg by mouth daily   Historical Provider, MD   amLODIPine (NORVASC) 5 MG tablet daily   Historical Provider, MD   carvedilol (COREG) 3.125 MG tablet Take 3.125 mg by mouth 2 times daily   Historical Provider, MD   nitroGLYCERIN (NITROSTAT) 0.4 MG SL tablet Place 1 tablet under the tongue every 5 minutes as needed for Chest pain up to max of 3 total doses. If no relief after 1 dose, call 911. Isaura Harrell MD   aspirin 81 MG tablet Take 81 mg by mouth daily. Historical Provider, MD   vitamin B-12 (CYANOCOBALAMIN) 1000 MCG tablet Take 1,000 mcg by mouth daily   Historical Provider, MD [place] (Including outside providers/suppliers regularly involved in providing care):   Patient Care Team:  Jelena Pena MD as PCP - General (Family Medicine)  Jelena Pena MD as PCP - [address] [place], [place] [address]led Provider  Mishel Piedra MD as Consulting Physician (Cardiology)  Hanna Sierra MD as Consulting Physician (Pulmonology)    Reviewed and updated this visit:  Tobacco  Allergies  Meds  Med Hx  Surg Hx  Soc Hx  Fam Hx           Karin Res, was evaluated through a synchronous (real-time) audio-video encounter. The patient (or guardian if applicable) is aware that this is a billable service, which includes applicable co-pays. This Virtual Visit was conducted with patient's (and/or legal guardian's) consent. The visit was conducted pursuant to the emergency declaration under the [address] [place] [address], [address] [place] [address] authority and the [employer] Act. Patient identification was verified, and a caregiver was present when appropriate. The patient was located at home in a state where the provider was licensed to provide care.

## 2022-04-13 NOTE — PATIENT INSTRUCTIONS
Personalized Preventive Plan for [place] - 4/13/2022  Medicare offers a range of preventive health benefits. Some of the tests and screenings are paid in full while other may be subject to a deductible, co-insurance, and/or copay. Some of these benefits include a comprehensive review of your medical history including lifestyle, illnesses that may run in your family, and various assessments and screenings as appropriate. After reviewing your medical record and screening and assessments performed today your provider may have ordered immunizations, labs, imaging, and/or referrals for you. A list of these orders (if applicable) as well as your Preventive Care list are included within your After Visit Summary for your review. Other Preventive Recommendations:    · A preventive eye exam performed by an eye specialist is recommended every 1-2 years to screen for glaucoma; cataracts, macular degeneration, and other eye disorders. · A preventive dental visit is recommended every 6 months. · Try to get at least 150 minutes of exercise per week or 10,000 steps per day on a pedometer . · Order or download the FREE \"Exercise & Physical Activity: Your Everyday Guide\" from [employer]. Call 0-444.109.8837 or search [employer] online. · You need 3152-9734 mg of calcium and 6390-5029 IU of vitamin D per day. It is possible to meet your calcium requirement with diet alone, but a vitamin D supplement is usually necessary to meet this goal.  · When exposed to the sun, use a sunscreen that protects against both UVA and UVB radiation with an SPF of 30 or greater. Reapply every 2 to 3 hours or after sweating, drying off with a towel, or swimming. · Always wear a seat belt when traveling in a car. Always wear a helmet when riding a bicycle or motorcycle.

## 2022-07-25 LAB
ALBUMIN SERPL-MCNC: 4.4 G/DL (ref 3.5–4.6)
ALP BLD-CCNC: 79 U/L (ref 35–104)
ALT SERPL-CCNC: 35 U/L (ref 0–41)
ANION GAP SERPL CALCULATED.3IONS-SCNC: 9 MEQ/L (ref 9–15)
AST SERPL-CCNC: 25 U/L (ref 0–40)
BILIRUB SERPL-MCNC: 0.3 MG/DL (ref 0.2–0.7)
BILIRUBIN DIRECT: <0.2 MG/DL (ref 0–0.4)
BILIRUBIN, INDIRECT: NORMAL MG/DL (ref 0–0.6)
BUN BLDV-MCNC: 15 MG/DL (ref 8–23)
CALCIUM SERPL-MCNC: 9.2 MG/DL (ref 8.5–9.9)
CHLORIDE BLD-SCNC: 107 MEQ/L (ref 95–107)
CHOLESTEROL, TOTAL: 95 MG/DL (ref 0–199)
CO2: 26 MEQ/L (ref 20–31)
CREAT SERPL-MCNC: 1.07 MG/DL (ref 0.7–1.2)
GFR AFRICAN AMERICAN: >60
GFR NON-AFRICAN AMERICAN: >60
GLUCOSE BLD-MCNC: 111 MG/DL (ref 70–99)
HDLC SERPL-MCNC: 37 MG/DL (ref 40–59)
LDL CHOLESTEROL CALCULATED: 37 MG/DL (ref 0–129)
POTASSIUM SERPL-SCNC: 4.5 MEQ/L (ref 3.4–4.9)
SODIUM BLD-SCNC: 142 MEQ/L (ref 135–144)
TOTAL PROTEIN: 7.3 G/DL (ref 6.3–8)
TRIGL SERPL-MCNC: 103 MG/DL (ref 0–150)

## 2022-08-15 ENCOUNTER — COMMUNITY OUTREACH (OUTPATIENT)
Dept: FAMILY MEDICINE CLINIC | Age: 72
End: 2022-08-15

## 2022-08-17 ENCOUNTER — OFFICE VISIT (OUTPATIENT)
Dept: FAMILY MEDICINE CLINIC | Age: 72
End: 2022-08-17
Payer: MEDICARE

## 2022-08-17 VITALS
SYSTOLIC BLOOD PRESSURE: 122 MMHG | WEIGHT: 218 LBS | HEIGHT: 76 IN | HEART RATE: 60 BPM | BODY MASS INDEX: 26.55 KG/M2 | RESPIRATION RATE: 12 BRPM | DIASTOLIC BLOOD PRESSURE: 78 MMHG

## 2022-08-17 DIAGNOSIS — R73.09 ELEVATED GLUCOSE LEVEL: Primary | ICD-10-CM

## 2022-08-17 DIAGNOSIS — R76.8 ELEVATED ANTINUCLEAR ANTIBODY (ANA) LEVEL: ICD-10-CM

## 2022-08-17 DIAGNOSIS — M60.9 MYOSITIS OF MULTIPLE SITES, UNSPECIFIED MYOSITIS TYPE: ICD-10-CM

## 2022-08-17 LAB
C-REACTIVE PROTEIN: <3 MG/L (ref 0–5)
HBA1C MFR BLD: 5.6 %
SEDIMENTATION RATE, ERYTHROCYTE: 15 MM (ref 0–20)
TOTAL CK: 278 U/L (ref 0–190)

## 2022-08-17 PROCEDURE — 83036 HEMOGLOBIN GLYCOSYLATED A1C: CPT | Performed by: FAMILY MEDICINE

## 2022-08-17 PROCEDURE — 1123F ACP DISCUSS/DSCN MKR DOCD: CPT | Performed by: FAMILY MEDICINE

## 2022-08-17 PROCEDURE — 99214 OFFICE O/P EST MOD 30 MIN: CPT | Performed by: FAMILY MEDICINE

## 2022-08-17 RX ORDER — CYANOCOBALAMIN (VITAMIN B-12) 5000MCG/ML
DROPS SUBLINGUAL
COMMUNITY

## 2022-08-17 ASSESSMENT — ENCOUNTER SYMPTOMS
SHORTNESS OF BREATH: 0
RESPIRATORY NEGATIVE: 1
ALLERGIC/IMMUNOLOGIC NEGATIVE: 1
EYES NEGATIVE: 1
GASTROINTESTINAL NEGATIVE: 1

## 2022-08-17 NOTE — PROGRESS NOTES
Patient is seen in follow up for   Chief Complaint   Patient presents with    Leg Pain     Patient arms and legs tighten up with movements sx x 5-6 months no known       Leg Pain   here with leg muscle pain with excertion. Past Medical History:   Diagnosis Date    CAD (coronary artery disease)     Congenital heart disease     Coronary artery disease     Heart attack ([facility] 10/30/2017    Hyperlipidemia     Hypertension     Hypothyroidism     Knee pain     MI (myocardial infarction) ([facility]      Patient Active Problem List    Diagnosis Date Noted    Stented coronary artery 2020    ST elevation myocardial infarction involving left anterior descending (LAD) coronary artery (HCC)     PMR (polymyalgia rheumatica) ([facility] 2020    [facility] 2020    Unstable angina ([facility] 2020    Chest pain 2020    Hypothyroid 2020    HTN (hypertension) 2020    CAD (coronary artery disease)     Congenital heart disease     Knee pain      Past Surgical History:   Procedure Laterality Date    CARDIAC CATHETERIZATION  16     [facility]    CORONARY ANGIOPLASTY WITH STENT PLACEMENT  2008    VASECTOMY       Family History   Problem Relation Age of Onset    Heart Disease Father     Coronary Art Dis Father      Social History     Socioeconomic History    Marital status:    Tobacco Use    Smoking status: Former     Packs/day: 1.50     Years: 20.00     Pack years: 30.00     Types: Cigarettes     Quit date:      Years since quittin.6    Smokeless tobacco: Never   Vaping Use    Vaping Use: Never used   Substance and Sexual Activity    Alcohol use:  Yes     Alcohol/week: 2.0 standard drinks     Types: 1 Glasses of wine, 1 Cans of beer per week     Comment: rarely     Drug use: No    Sexual activity: Yes     Partners: Female     Social Determinants of Health     Financial Resource Strain: Low Risk     Difficulty of Paying Living Expenses: Not hard at all   Food Insecurity: No Food Insecurity    Worried About Running Out of Food in the Last Year: Never true    [address] in the Last Year: Never true   Transportation Needs: No Transportation Needs    Lack of Transportation (Medical): No    Lack of Transportation (Non-Medical): No   Physical Activity: Sufficiently Active    Days of Exercise per Week: 7 days    Minutes of Exercise per Session: 60 min     Current Outpatient Medications   Medication Sig Dispense Refill    Cyanocobalamin (B-12 SUPER STRENGTH) 5000 MCG/ML LIQD Place under the tongue      REPATHA SURECLICK 881 MG/ML SOAJ Inject 140 mg into the muscle every 3 months      levothyroxine (SYNTHROID) 100 MCG tablet TAKE 1 TABLET DAILY 90 tablet 1    Multiple Vitamin (MULTIVITAMIN ADULT PO) Take by mouth      NEXLETOL 180 MG TABS       clopidogrel (PLAVIX) 75 MG tablet Take 75 mg by mouth daily       amLODIPine (NORVASC) 5 MG tablet daily       carvedilol (COREG) 3.125 MG tablet Take 3.125 mg by mouth 2 times daily       nitroGLYCERIN (NITROSTAT) 0.4 MG SL tablet Place 1 tablet under the tongue every 5 minutes as needed for Chest pain up to max of 3 total doses. If no relief after 1 dose, call 911. 25 tablet 3    aspirin 81 MG tablet Take 81 mg by mouth daily. naphazoline-pheniramine (NAPHCON-A) 0.025-0.3 % ophthalmic solution Place 1 drop into both eyes 4 times daily 1 Bottle 3     No current facility-administered medications for this visit.      Current Outpatient Medications on File Prior to Visit   Medication Sig Dispense Refill    Cyanocobalamin (B-12 SUPER STRENGTH) 5000 MCG/ML LIQD Place under the tongue      REPATHA SURECLICK 988 MG/ML SOAJ Inject 140 mg into the muscle every 3 months      levothyroxine (SYNTHROID) 100 MCG tablet TAKE 1 TABLET DAILY 90 tablet 1    Multiple Vitamin (MULTIVITAMIN ADULT PO) Take by mouth      NEXLETOL 180 MG TABS       clopidogrel (PLAVIX) 75 MG tablet Take 75 mg by mouth daily       amLODIPine (NORVASC) 5 MG tablet daily       carvedilol Exam  Constitutional:       Appearance: He is well-developed. HENT:      Right Ear: External ear normal.      Left Ear: External ear normal.   Eyes:      Conjunctiva/sclera: Conjunctivae normal.      Pupils: Pupils are equal, round, and reactive to light. Neck:      Thyroid: No thyromegaly. Cardiovascular:      Rate and Rhythm: Normal rate and regular rhythm. Heart sounds: Normal heart sounds. No murmur heard. No friction rub. No gallop. Pulmonary:      Effort: Pulmonary effort is normal. No respiratory distress. Breath sounds: Normal breath sounds. No wheezing. Abdominal:      General: Bowel sounds are normal. There is no distension. Palpations: Abdomen is soft. There is no mass. Tenderness: There is no abdominal tenderness. There is no guarding or rebound. Hernia: No hernia is present. Genitourinary:     Penis: Normal.    Musculoskeletal:         General: No tenderness. Normal range of motion. Cervical back: Normal range of motion and neck supple. Lymphadenopathy:      Cervical: No cervical adenopathy. Skin:     General: Skin is warm and dry. Neurological:      Mental Status: He is alert and oriented to person, place, and time. Cranial Nerves: No cranial nerve deficit. Coordination: Coordination normal.       Assessment   Diagnosis Orders   1. Elevated glucose level  POCT glycosylated hemoglobin (Hb A1C)      2. Myositis of multiple sites, unspecified myositis type  Sedimentation Rate    C-Reactive Protein    CK    Rheumatoid Factor    Aldolase    Amb External Referral To Rheumatology      3.  Elevated antinuclear antibody (COLE) level  Amb External Referral To Rheumatology        Problem List    None    Plan  Orders Placed This Encounter   Procedures    Sedimentation Rate     Standing Status:   Future     Number of Occurrences:   1     Standing Expiration Date:   8/17/2023    C-Reactive Protein     Standing Status:   Future     Number of Occurrences:   1 Standing Expiration Date:   8/17/2023    CK     Standing Status:   Future     Number of Occurrences:   1     Standing Expiration Date:   8/17/2023    Rheumatoid Factor     Standing Status:   Future     Number of Occurrences:   1     Standing Expiration Date:   8/17/2023    Aldolase     Standing Status:   Future     Number of Occurrences:   1     Standing Expiration Date:   8/17/2023    Amb External Referral To Rheumatology     Referral Priority:   Routine     Referral Type:   Eval and Treat     Referral Reason:   Specialty Services Required     Referred to Provider:   Rehan Ferrell MD     Requested Specialty:   Rheumatology     Number of Visits Requested:   1    POCT glycosylated hemoglobin (Hb A1C)     No orders of the defined types were placed in this encounter. No follow-ups on file.   Riley Peace MD

## 2022-08-18 LAB
ALDOLASE: 7 U/L (ref 1.2–7.6)
RHEUMATOID FACTOR: <10 IU/ML

## 2022-08-19 LAB
ANTINUCLEAR AB INTERPRETIVE COMMENT: NORMAL
ANTINUCLEAR ANTIBODY, HEP-2, IGG: NORMAL

## 2022-09-28 RX ORDER — LEVOTHYROXINE SODIUM 0.1 MG/1
TABLET ORAL
Qty: 90 TABLET | Refills: 3 | Status: SHIPPED | OUTPATIENT
Start: 2022-09-28

## 2022-11-21 LAB
ALBUMIN SERPL-MCNC: 4.3 G/DL
ALP BLD-CCNC: 85 U/L
ALT SERPL-CCNC: 29 U/L
ANA TITER: POSITIVE
ANION GAP SERPL CALCULATED.3IONS-SCNC: 1.5 MMOL/L
AST SERPL-CCNC: 22 U/L
AVERAGE GLUCOSE: NORMAL
BASOPHILS ABSOLUTE: 32 /ΜL
BASOPHILS RELATIVE PERCENT: 0.5 %
BILIRUB SERPL-MCNC: 0.7 MG/DL (ref 0.1–1.4)
BUN BLDV-MCNC: 21 MG/DL
C-REACTIVE PROTEIN: 1.2
CALCIUM SERPL-MCNC: 9.6 MG/DL
CHLORIDE BLD-SCNC: 103 MMOL/L
CHOLESTEROL, TOTAL: 193 MG/DL
CHOLESTEROL/HDL RATIO: 5.8
CO2: 27 MMOL/L
CREAT SERPL-MCNC: 1.14 MG/DL
EGFR: 67
EOSINOPHILS ABSOLUTE: 224 /ΜL
EOSINOPHILS RELATIVE PERCENT: 3.5 %
GLUCOSE BLD-MCNC: 102 MG/DL
HBA1C MFR BLD: 5.5 %
HCT VFR BLD CALC: 28.5 % (ref 41–53)
HDLC SERPL-MCNC: 33 MG/DL (ref 35–70)
HEMOGLOBIN: 130 G/DL (ref 13.5–17.5)
LDL CHOLESTEROL CALCULATED: 129 MG/DL (ref 0–160)
LYMPHOCYTES ABSOLUTE: 1651 /ΜL
LYMPHOCYTES RELATIVE PERCENT: 25.8 %
MCH RBC QN AUTO: 30 PG
MCHC RBC AUTO-ENTMCNC: 33.8 G/DL
MCV RBC AUTO: 88.9 FL
MONOCYTES ABSOLUTE: 666 /ΜL
MONOCYTES RELATIVE PERCENT: 10.4 %
NEUTROPHILS ABSOLUTE: 3827 /ΜL
NEUTROPHILS RELATIVE PERCENT: 59.8 %
NONHDLC SERPL-MCNC: 160 MG/DL
PDW BLD-RTO: 12.2 %
PLATELET # BLD: 285 K/ΜL
PMV BLD AUTO: 9.8 FL
POTASSIUM SERPL-SCNC: 4.8 MMOL/L
RBC # BLD: 4.33 10^6/ΜL
RHEUMATOID FACTOR: <14
SODIUM BLD-SCNC: 137 MMOL/L
T3 FREE: 3
T4 FREE: 1.2
TOTAL PROTEIN: 7.2
TRIGL SERPL-MCNC: 172 MG/DL
TSH SERPL DL<=0.05 MIU/L-ACNC: 3.75 UIU/ML
URIC ACID: 7.4
VITAMIN B-12: 445
VLDLC SERPL CALC-MCNC: ABNORMAL MG/DL
WBC # BLD: 6.4 10^3/ML

## 2023-02-17 LAB
ALBUMIN SERPL-MCNC: 4.3 G/DL
ALP BLD-CCNC: 68 U/L
ALT SERPL-CCNC: 33 U/L
ANION GAP SERPL CALCULATED.3IONS-SCNC: 1.5 MMOL/L
AST SERPL-CCNC: 22 U/L
AVERAGE GLUCOSE: NORMAL
BASOPHILS ABSOLUTE: 39 /ΜL
BASOPHILS RELATIVE PERCENT: 0.7 %
BILIRUB SERPL-MCNC: 0.5 MG/DL (ref 0.1–1.4)
BUN BLDV-MCNC: 17 MG/DL
C-REACTIVE PROTEIN: 0.2
CALCIUM SERPL-MCNC: 9.5 MG/DL
CHLORIDE BLD-SCNC: 104 MMOL/L
CHOLESTEROL, TOTAL: 188 MG/DL
CHOLESTEROL/HDL RATIO: 4.9
CO2: 25 MMOL/L
CREAT SERPL-MCNC: 1.11 MG/DL
EGFR: 70
EOSINOPHILS ABSOLUTE: 198 /ΜL
EOSINOPHILS RELATIVE PERCENT: 3.6 %
FERRITIN: 95 NG/ML (ref 18–300)
GLUCOSE BLD-MCNC: 101 MG/DL
HBA1C MFR BLD: 5.7 %
HCT VFR BLD CALC: 37.9 % (ref 41–53)
HDLC SERPL-MCNC: 38 MG/DL (ref 35–70)
HEMOGLOBIN: 12.5 G/DL (ref 13.5–17.5)
IRON: 84
LDL CHOLESTEROL CALCULATED: 123 MG/DL (ref 0–160)
LYMPHOCYTES ABSOLUTE: 2052 /ΜL
LYMPHOCYTES RELATIVE PERCENT: 37.3 %
MCH RBC QN AUTO: 30.4 PG
MCHC RBC AUTO-ENTMCNC: 33 G/DL
MCV RBC AUTO: 92.2 FL
MONOCYTES ABSOLUTE: 677 /ΜL
MONOCYTES RELATIVE PERCENT: 12.3 %
NEUTROPHILS ABSOLUTE: 2536 /ΜL
NEUTROPHILS RELATIVE PERCENT: 46.1 %
NONHDLC SERPL-MCNC: 150 MG/DL
PDW BLD-RTO: 12.5 %
PLATELET # BLD: 249 K/ΜL
PMV BLD AUTO: 9.4 FL
POTASSIUM SERPL-SCNC: 5 MMOL/L
RBC # BLD: 4.11 10^6/ΜL
RHEUMATOID FACTOR: <14
SODIUM BLD-SCNC: 135 MMOL/L
T3 FREE: 3.1
T4 FREE: 1.2
TOTAL PROTEIN: 7
TRIGL SERPL-MCNC: 152 MG/DL
TSH SERPL DL<=0.05 MIU/L-ACNC: 5.44 UIU/ML
URIC ACID: 6.3
VITAMIN B-12: 509
VLDLC SERPL CALC-MCNC: NORMAL MG/DL
WBC # BLD: 5.5 10^3/ML

## 2023-04-03 ENCOUNTER — TELEPHONE (OUTPATIENT)
Dept: FAMILY MEDICINE CLINIC | Age: 73
End: 2023-04-03

## 2023-04-03 NOTE — TELEPHONE ENCOUNTER
----- Message from Becky Driscoll sent at 4/3/2023  3:30 PM EDT -----  Subject: Message to Provider    QUESTIONS  Information for Provider? [place] after [place]   10th 2023 and will need to continue receiving a new medication that is an   injectable called \"Leqvio\". Patient wants to make sure that the physician   will have this med available in time for his next appointment with Dr. Franklin Patel in June 2023.  ---------------------------------------------------------------------------  --------------  [id number]!  [phone]; OK to leave message on voicemail  ---------------------------------------------------------------------------  --------------  SCRIPT ANSWERS  Relationship to Patient?  Self

## 2023-04-06 NOTE — TELEPHONE ENCOUNTER
Spoke to pt will gt this ordered for him when he comes in to see us end of May will be given at the infusion ctr

## 2023-05-31 ENCOUNTER — TELEPHONE (OUTPATIENT)
Dept: FAMILY MEDICINE CLINIC | Age: 73
End: 2023-05-31

## 2023-05-31 ENCOUNTER — OFFICE VISIT (OUTPATIENT)
Dept: FAMILY MEDICINE CLINIC | Age: 73
End: 2023-05-31
Payer: MEDICARE

## 2023-05-31 VITALS
OXYGEN SATURATION: 100 % | DIASTOLIC BLOOD PRESSURE: 78 MMHG | HEART RATE: 62 BPM | BODY MASS INDEX: 27.4 KG/M2 | SYSTOLIC BLOOD PRESSURE: 122 MMHG | TEMPERATURE: 97 F | WEIGHT: 225 LBS | HEIGHT: 76 IN

## 2023-05-31 DIAGNOSIS — M35.3 PMR (POLYMYALGIA RHEUMATICA) (HCC): Primary | ICD-10-CM

## 2023-05-31 DIAGNOSIS — E03.9 HYPOTHYROIDISM, UNSPECIFIED TYPE: ICD-10-CM

## 2023-05-31 DIAGNOSIS — I25.10 CORONARY ARTERY DISEASE INVOLVING NATIVE HEART WITHOUT ANGINA PECTORIS, UNSPECIFIED VESSEL OR LESION TYPE: ICD-10-CM

## 2023-05-31 PROCEDURE — 1123F ACP DISCUSS/DSCN MKR DOCD: CPT | Performed by: FAMILY MEDICINE

## 2023-05-31 PROCEDURE — G8427 DOCREV CUR MEDS BY ELIG CLIN: HCPCS | Performed by: FAMILY MEDICINE

## 2023-05-31 PROCEDURE — 99214 OFFICE O/P EST MOD 30 MIN: CPT | Performed by: FAMILY MEDICINE

## 2023-05-31 PROCEDURE — 1036F TOBACCO NON-USER: CPT | Performed by: FAMILY MEDICINE

## 2023-05-31 PROCEDURE — 3074F SYST BP LT 130 MM HG: CPT | Performed by: FAMILY MEDICINE

## 2023-05-31 PROCEDURE — 3078F DIAST BP <80 MM HG: CPT | Performed by: FAMILY MEDICINE

## 2023-05-31 PROCEDURE — G8417 CALC BMI ABV UP PARAM F/U: HCPCS | Performed by: FAMILY MEDICINE

## 2023-05-31 PROCEDURE — 3017F COLORECTAL CA SCREEN DOC REV: CPT | Performed by: FAMILY MEDICINE

## 2023-05-31 RX ORDER — INCLISIRAN 284 MG/1.5ML
284 INJECTION, SOLUTION SUBCUTANEOUS ONCE
Qty: 1.5 ML | Refills: 0 | Status: SHIPPED | OUTPATIENT
Start: 2023-05-31 | End: 2023-05-31

## 2023-05-31 SDOH — ECONOMIC STABILITY: INCOME INSECURITY: HOW HARD IS IT FOR YOU TO PAY FOR THE VERY BASICS LIKE FOOD, HOUSING, MEDICAL CARE, AND HEATING?: NOT HARD AT ALL

## 2023-05-31 SDOH — ECONOMIC STABILITY: FOOD INSECURITY: WITHIN THE PAST 12 MONTHS, YOU WORRIED THAT YOUR FOOD WOULD RUN OUT BEFORE YOU GOT MONEY TO BUY MORE.: NEVER TRUE

## 2023-05-31 SDOH — ECONOMIC STABILITY: HOUSING INSECURITY
IN THE LAST 12 MONTHS, WAS THERE A TIME WHEN YOU DID NOT HAVE A STEADY PLACE TO SLEEP OR SLEPT IN A SHELTER (INCLUDING NOW)?: NO

## 2023-05-31 SDOH — ECONOMIC STABILITY: FOOD INSECURITY: WITHIN THE PAST 12 MONTHS, THE FOOD YOU BOUGHT JUST DIDN'T LAST AND YOU DIDN'T HAVE MONEY TO GET MORE.: NEVER TRUE

## 2023-05-31 ASSESSMENT — PATIENT HEALTH QUESTIONNAIRE - PHQ9
SUM OF ALL RESPONSES TO PHQ QUESTIONS 1-9: 0
SUM OF ALL RESPONSES TO PHQ QUESTIONS 1-9: 0
1. LITTLE INTEREST OR PLEASURE IN DOING THINGS: 0
SUM OF ALL RESPONSES TO PHQ9 QUESTIONS 1 & 2: 0
SUM OF ALL RESPONSES TO PHQ QUESTIONS 1-9: 0
2. FEELING DOWN, DEPRESSED OR HOPELESS: 0
SUM OF ALL RESPONSES TO PHQ QUESTIONS 1-9: 0

## 2023-05-31 ASSESSMENT — ENCOUNTER SYMPTOMS
GASTROINTESTINAL NEGATIVE: 1
EYES NEGATIVE: 1
SHORTNESS OF BREATH: 0
ALLERGIC/IMMUNOLOGIC NEGATIVE: 1
RESPIRATORY NEGATIVE: 1

## 2023-05-31 NOTE — TELEPHONE ENCOUNTER
[facility]  needs DX code to give patient Carol Delgado   Please call [phone]  Thank You

## 2023-05-31 NOTE — PROGRESS NOTES
Patient is seen in follow up for   Chief Complaint   Patient presents with    Other     Patient presents today to discuss test results with provider. Wants opinion on gallbladder results. Other  This is a chronic problem. The current episode started more than 1 year ago. The problem has been unchanged. Pertinent negatives include no chest pain, chills or fever. Hyperlipidemia  This is a chronic problem. The current episode started more than 1 year ago. The problem is controlled. Recent lipid tests were reviewed and are normal. Pertinent negatives include no chest pain or shortness of breath. Current antihyperlipidemic treatment includes statins. The current treatment provides moderate improvement of lipids. There are no compliance problems. Risk factors for coronary artery disease include male sex.      Past Medical History:   Diagnosis Date    CAD (coronary artery disease)     Congenital heart disease     Coronary artery disease     Heart attack ([facility] 10/30/2017    Hyperlipidemia     Hypertension     Hypothyroidism     Knee pain     MI (myocardial infarction) ([facility] 2008     Patient Active Problem List    Diagnosis Date Noted    Stented coronary artery 09/14/2020    ST elevation myocardial infarction involving left anterior descending (LAD) coronary artery (HCC)     PMR (polymyalgia rheumatica) ([facility] 09/29/2020    Lung granuloma ([facility] 09/29/2020    Unstable angina ([facility] 09/13/2020    Chest pain 09/12/2020    Hypothyroid 09/12/2020    HTN (hypertension) 09/12/2020    CAD (coronary artery disease)     Congenital heart disease     Knee pain      Past Surgical History:   Procedure Laterality Date    CARDIAC CATHETERIZATION  05/13/16     [facility]    CORONARY ANGIOPLASTY WITH STENT PLACEMENT  2008    VASECTOMY       Family History   Problem Relation Age of Onset    Heart Disease Father     Coronary Art Dis Father      Social History     Socioeconomic History    Marital status:      Spouse name: None    Number

## 2023-06-01 DIAGNOSIS — E78.5 HYPERLIPIDEMIA, UNSPECIFIED HYPERLIPIDEMIA TYPE: ICD-10-CM

## 2023-06-22 ENCOUNTER — HOSPITAL ENCOUNTER (OUTPATIENT)
Dept: INFUSION THERAPY | Age: 73
Setting detail: INFUSION SERIES
Discharge: HOME | End: 2023-06-22
Payer: MEDICARE

## 2023-06-22 VITALS
TEMPERATURE: 97.8 F | DIASTOLIC BLOOD PRESSURE: 62 MMHG | SYSTOLIC BLOOD PRESSURE: 143 MMHG | HEART RATE: 57 BPM | RESPIRATION RATE: 18 BRPM

## 2023-06-22 DIAGNOSIS — I25.10 CORONARY ARTERY DISEASE INVOLVING NATIVE HEART WITHOUT ANGINA PECTORIS, UNSPECIFIED VESSEL OR LESION TYPE: ICD-10-CM

## 2023-06-22 DIAGNOSIS — E78.5 HYPERLIPIDEMIA, UNSPECIFIED HYPERLIPIDEMIA TYPE: Primary | ICD-10-CM

## 2023-06-22 PROCEDURE — 96372 THER/PROPH/DIAG INJ SC/IM: CPT

## 2023-06-22 PROCEDURE — 6360000002 HC RX W HCPCS: Performed by: FAMILY MEDICINE

## 2023-06-22 RX ADMIN — INCLISIRAN 284 MG: 284 INJECTION, SOLUTION SUBCUTANEOUS at 13:30

## 2023-06-22 NOTE — PROGRESS NOTES
Injection provided, patient tolerated well. Patient provided reminder card for next dose due. Patient to let us know if he will not be here for next dose.

## 2023-06-22 NOTE — PROGRESS NOTES
Patient arrived on unit by ambulation. Alert, oriented, resting in chair. Vital signs obtained. Patient declined further medication information. States he received initial dose in 3/2023 in [place]. He lives in [place] half the year and is not sure he will be back for next 6 month dose.

## 2023-07-28 LAB
ALBUMIN SERPL-MCNC: 4.3 G/DL (ref 3.5–4.6)
ALP SERPL-CCNC: 82 U/L (ref 35–104)
ALT SERPL-CCNC: 42 U/L (ref 0–41)
ANION GAP SERPL CALCULATED.3IONS-SCNC: 12 MEQ/L (ref 9–15)
AST SERPL-CCNC: 33 U/L (ref 0–40)
BILIRUB SERPL-MCNC: 0.5 MG/DL (ref 0.2–0.7)
BUN SERPL-MCNC: 17 MG/DL (ref 8–23)
CALCIUM SERPL-MCNC: 9.1 MG/DL (ref 8.5–9.9)
CHLORIDE SERPL-SCNC: 103 MEQ/L (ref 95–107)
CHOLEST SERPL-MCNC: 134 MG/DL (ref 0–199)
CO2 SERPL-SCNC: 25 MEQ/L (ref 20–31)
CREAT SERPL-MCNC: 1.11 MG/DL (ref 0.7–1.2)
GLOBULIN SER CALC-MCNC: 3.1 G/DL (ref 2.3–3.5)
GLUCOSE SERPL-MCNC: 100 MG/DL (ref 70–99)
HDLC SERPL-MCNC: 36 MG/DL (ref 40–59)
LDLC SERPL CALC-MCNC: 68 MG/DL (ref 0–129)
POTASSIUM SERPL-SCNC: 4.5 MEQ/L (ref 3.4–4.9)
PROT SERPL-MCNC: 7.4 G/DL (ref 6.3–8)
SODIUM SERPL-SCNC: 140 MEQ/L (ref 135–144)
TRIGL SERPL-MCNC: 152 MG/DL (ref 0–150)

## 2023-09-07 RX ORDER — LEVOTHYROXINE SODIUM 0.1 MG/1
TABLET ORAL
Qty: 90 TABLET | Refills: 3 | Status: SHIPPED | OUTPATIENT
Start: 2023-09-07

## 2023-09-12 ENCOUNTER — OFFICE VISIT (OUTPATIENT)
Dept: FAMILY MEDICINE CLINIC | Age: 73
End: 2023-09-12
Payer: MEDICARE

## 2023-09-12 VITALS
WEIGHT: 224 LBS | HEART RATE: 63 BPM | TEMPERATURE: 97.3 F | BODY MASS INDEX: 27.28 KG/M2 | HEIGHT: 76 IN | DIASTOLIC BLOOD PRESSURE: 68 MMHG | OXYGEN SATURATION: 95 % | SYSTOLIC BLOOD PRESSURE: 98 MMHG

## 2023-09-12 DIAGNOSIS — S99.921A INJURY OF TOENAIL OF RIGHT FOOT, INITIAL ENCOUNTER: Primary | ICD-10-CM

## 2023-09-12 PROCEDURE — G8427 DOCREV CUR MEDS BY ELIG CLIN: HCPCS | Performed by: NURSE PRACTITIONER

## 2023-09-12 PROCEDURE — G8417 CALC BMI ABV UP PARAM F/U: HCPCS | Performed by: NURSE PRACTITIONER

## 2023-09-12 PROCEDURE — 99213 OFFICE O/P EST LOW 20 MIN: CPT | Performed by: NURSE PRACTITIONER

## 2023-09-12 PROCEDURE — 3017F COLORECTAL CA SCREEN DOC REV: CPT | Performed by: NURSE PRACTITIONER

## 2023-09-12 PROCEDURE — 1036F TOBACCO NON-USER: CPT | Performed by: NURSE PRACTITIONER

## 2023-09-12 PROCEDURE — 1123F ACP DISCUSS/DSCN MKR DOCD: CPT | Performed by: NURSE PRACTITIONER

## 2023-09-12 PROCEDURE — 3074F SYST BP LT 130 MM HG: CPT | Performed by: NURSE PRACTITIONER

## 2023-09-12 PROCEDURE — 3078F DIAST BP <80 MM HG: CPT | Performed by: NURSE PRACTITIONER

## 2023-09-12 RX ORDER — AMLODIPINE BESYLATE 5 MG/1
TABLET ORAL
COMMUNITY
Start: 2023-08-08

## 2023-09-12 NOTE — PROGRESS NOTES
Subjective:      Patient ID: Kateryna Kelly is a 74 y.o. male who presents today for:  Chief Complaint   Patient presents with    Toe Injury     Pt states he stubbed his right pinky toe last night, nail is coming off. HPI  Patient is here with c/o right toe pain that started last night. Says he bumped on door facing last night. Says it is bleeding. Says he thinks the nail is half off. Says he cleaned it off and placed a bandaid. Past Medical History:   Diagnosis Date    CAD (coronary artery disease)     Congenital heart disease     Coronary artery disease     Heart attack ([facility] 10/30/2017    Hyperlipidemia     Hypertension     Hypothyroidism     Knee pain     MI (myocardial infarction) ([address] [place] [address])      Past Surgical History:   Procedure Laterality Date    CARDIAC CATHETERIZATION  16     [facility] WITH STENT PLACEMENT  2008    VASECTOMY       Social History     Socioeconomic History    Marital status:      Spouse name: Not on file    Number of children: Not on file    Years of education: Not on file    Highest education level: Not on file   Occupational History    Not on file   Tobacco Use    Smoking status: Former     Packs/day: 1.50     Years: 20.00     Additional pack years: 0.00     Total pack years: 30.00     Types: Cigarettes     Quit date: 18     Years since quittin.7    Smokeless tobacco: Never   Vaping Use    Vaping Use: Never used   Substance and Sexual Activity    Alcohol use:  Yes     Alcohol/week: 2.0 standard drinks of alcohol     Types: 1 Glasses of wine, 1 Cans of beer per week     Comment: rarely     Drug use: No    Sexual activity: Yes     Partners: Female   Other Topics Concern    Not on file   Social History Narrative    Not on file     Social Determinants of Health     Financial Resource Strain: Low Risk  (2023)    Overall Financial Resource Strain (CARDIA)     Difficulty of Paying Living Expenses: Not hard at all   Food Insecurity:

## 2023-10-06 ENCOUNTER — TELEMEDICINE (OUTPATIENT)
Dept: FAMILY MEDICINE CLINIC | Age: 73
End: 2023-10-06
Payer: MEDICARE

## 2023-10-06 DIAGNOSIS — Z00.00 MEDICARE ANNUAL WELLNESS VISIT, SUBSEQUENT: Primary | ICD-10-CM

## 2023-10-06 PROCEDURE — G8484 FLU IMMUNIZE NO ADMIN: HCPCS | Performed by: NURSE PRACTITIONER

## 2023-10-06 PROCEDURE — G0439 PPPS, SUBSEQ VISIT: HCPCS | Performed by: NURSE PRACTITIONER

## 2023-10-06 PROCEDURE — 1123F ACP DISCUSS/DSCN MKR DOCD: CPT | Performed by: NURSE PRACTITIONER

## 2023-10-06 PROCEDURE — 3017F COLORECTAL CA SCREEN DOC REV: CPT | Performed by: NURSE PRACTITIONER

## 2023-10-06 ASSESSMENT — PATIENT HEALTH QUESTIONNAIRE - PHQ9
SUM OF ALL RESPONSES TO PHQ QUESTIONS 1-9: 0
1. LITTLE INTEREST OR PLEASURE IN DOING THINGS: 0
SUM OF ALL RESPONSES TO PHQ QUESTIONS 1-9: 0
SUM OF ALL RESPONSES TO PHQ QUESTIONS 1-9: 0
2. FEELING DOWN, DEPRESSED OR HOPELESS: 0
SUM OF ALL RESPONSES TO PHQ QUESTIONS 1-9: 0
SUM OF ALL RESPONSES TO PHQ9 QUESTIONS 1 & 2: 0

## 2023-10-06 ASSESSMENT — LIFESTYLE VARIABLES
HOW OFTEN DO YOU HAVE A DRINK CONTAINING ALCOHOL: MONTHLY OR LESS
HOW MANY STANDARD DRINKS CONTAINING ALCOHOL DO YOU HAVE ON A TYPICAL DAY: PATIENT DOES NOT DRINK

## 2023-10-06 NOTE — PROGRESS NOTES
Medicare Annual Wellness Visit    Vishal Sher is here for Medicare AWV    Assessment & Plan   Medicare annual wellness visit, subsequent    Recommendations for Preventive Services Due: see orders and patient instructions/AVS.  Recommended screening schedule for the next 5-10 years is provided to the patient in written form: see Patient Instructions/AVS.     Return in 1 year (on 10/6/2024). Subjective       Patient's complete Health Risk Assessment and screening values have been reviewed and are found in Flowsheets. The following problems were reviewed today and where indicated follow up appointments were made and/or referrals ordered. No Positive Risk Factors identified today. Objective      Patient-Reported Vitals  No data recorded          Allergies   Allergen Reactions    Other Other (See Comments)     Leg cramps    Statins      Leg cramps    Propofol Rash     Prior to Visit Medications    Medication Sig Taking?  Authorizing Provider   amLODIPine (NORVASC) 5 MG tablet  Yes Kimberly Cardenas MD   Evolocumab 140 MG/ML SOAJ Inject into the skin Yes Kimberly Cardenas MD   levothyroxine (SYNTHROID) 100 MCG tablet TAKE 1 TABLET BY MOUTH DAILY Yes Temo Brennan MD   tiZANidine (ZANAFLEX) 4 MG tablet Take 1 tablet by mouth 2 times daily Yes Kimberly Cardenas MD   Inclisiran Sodium (LEQVIO) 284 MG/1.5ML  Yes Kimberly Cardenas MD   Cyanocobalamin (B-12 SUPER STRENGTH) 5000 MCG/ML LIQD Place under the tongue Yes Kimberly Cardenas MD   naphazoline-pheniramine (NAPHCON-A) 0.025-0.3 % ophthalmic solution Place 1 drop into both eyes 4 times daily Yes Temo Brennan MD   Multiple Vitamin (MULTIVITAMIN ADULT PO) Take by mouth Yes Kimberly Cardenas MD   carvedilol (COREG) 3.125 MG tablet Take 1 tablet by mouth 2 times daily Yes Kimberly Cardenas MD   nitroGLYCERIN (NITROSTAT) 0.4 MG SL tablet Place 1 tablet under the tongue every 5 minutes as needed for

## 2023-10-06 NOTE — PATIENT INSTRUCTIONS
[place] on Aging online. You need 3457-7179 mg of calcium and 4806-1763 IU of vitamin D per day. It is possible to meet your calcium requirement with diet alone, but a vitamin D supplement is usually necessary to meet this goal.  When exposed to the sun, use a sunscreen that protects against both UVA and UVB radiation with an SPF of 30 or greater. Reapply every 2 to 3 hours or after sweating, drying off with a towel, or swimming. Always wear a seat belt when traveling in a car. Always wear a helmet when riding a bicycle or motorcycle.

## 2023-10-12 DIAGNOSIS — I10 PRIMARY HYPERTENSION: ICD-10-CM

## 2023-10-12 RX ORDER — INCLISIRAN 284 MG/1.5ML
284 INJECTION, SOLUTION SUBCUTANEOUS
COMMUNITY
End: 2024-05-14 | Stop reason: SDUPTHER

## 2023-10-12 RX ORDER — AMLODIPINE BESYLATE 5 MG/1
5 TABLET ORAL DAILY
COMMUNITY
Start: 2018-10-10 | End: 2023-10-12 | Stop reason: SDUPTHER

## 2023-10-12 RX ORDER — AMLODIPINE BESYLATE 5 MG/1
5 TABLET ORAL DAILY
Qty: 90 TABLET | Refills: 3 | Status: SHIPPED | OUTPATIENT
Start: 2023-10-12 | End: 2024-05-14 | Stop reason: SDUPTHER

## 2023-10-12 RX ORDER — VIT C/E/ZN/COPPR/LUTEIN/ZEAXAN 250MG-90MG
1 CAPSULE ORAL DAILY
COMMUNITY
End: 2024-05-14 | Stop reason: ALTCHOICE

## 2023-10-12 RX ORDER — LEVOTHYROXINE SODIUM 100 UG/1
1 TABLET ORAL DAILY
COMMUNITY
Start: 2018-04-21

## 2023-10-12 RX ORDER — NITROGLYCERIN 0.4 MG/1
0.4 TABLET SUBLINGUAL EVERY 5 MIN PRN
COMMUNITY
Start: 2018-10-10 | End: 2024-05-14 | Stop reason: SDUPTHER

## 2023-10-12 RX ORDER — ASPIRIN 81 MG/1
1 TABLET ORAL DAILY
COMMUNITY

## 2023-10-12 RX ORDER — CARVEDILOL 3.12 MG/1
1 TABLET ORAL
COMMUNITY
Start: 2018-10-10 | End: 2023-10-16 | Stop reason: SDUPTHER

## 2023-10-12 RX ORDER — MULTIVITAMIN
1 TABLET ORAL DAILY
COMMUNITY

## 2023-10-12 RX ORDER — LANOLIN ALCOHOL/MO/W.PET/CERES
1 CREAM (GRAM) TOPICAL DAILY
COMMUNITY

## 2023-10-18 DIAGNOSIS — I10 HYPERTENSION, UNSPECIFIED TYPE: ICD-10-CM

## 2023-10-19 RX ORDER — CARVEDILOL 3.12 MG/1
3.12 TABLET ORAL
Qty: 180 TABLET | Refills: 3 | Status: SHIPPED | OUTPATIENT
Start: 2023-10-19 | End: 2024-05-14 | Stop reason: SDUPTHER

## 2023-10-26 ENCOUNTER — CLINICAL DOCUMENTATION (OUTPATIENT)
Facility: HOSPITAL | Age: 73
End: 2023-10-26

## 2023-10-26 NOTE — PROGRESS NOTES
Patient Assistance    Met with: Patient Call    Navigator Type:  Infusion  Documentation Type: New Patient  Contact Type: No Contact  Navigation Status: Copay Enrollment  Status of Patient Insurance Coverage: Patient has active coverage  Form of PAP Assistance: [facility]  Patient Assistance Sponsor Name: Apolonia Chava          Additional notes: [phone]  [facility] Enrollment    Drug Name: OTHER  Other Drug Name: Arlet Senior  Form of PAP Assistance: [address] [place] [address]

## 2023-12-27 PROBLEM — R13.19 CERVICAL DYSPHAGIA: Status: ACTIVE | Noted: 2023-12-27

## 2023-12-27 PROBLEM — I25.10 ARTERIOSCLEROSIS OF CORONARY ARTERY: Status: ACTIVE | Noted: 2023-12-27

## 2023-12-27 PROBLEM — I10 HYPERTENSION: Status: ACTIVE | Noted: 2023-12-27

## 2023-12-27 PROBLEM — E78.5 HYPERLIPIDEMIA: Status: ACTIVE | Noted: 2023-12-27

## 2023-12-27 PROBLEM — D64.9 ANEMIA: Status: ACTIVE | Noted: 2023-12-27

## 2023-12-27 PROBLEM — M79.10 MYALGIA: Status: ACTIVE | Noted: 2023-12-27

## 2023-12-27 PROBLEM — G51.31 CLONIC HEMIFACIAL SPASM, RIGHT: Status: ACTIVE | Noted: 2023-12-27

## 2024-04-16 LAB
ALBUMIN SERPL-MCNC: 4.2 G/DL
ALP BLD-CCNC: 67 U/L
ALT SERPL-CCNC: 36 U/L
ANION GAP SERPL CALCULATED.3IONS-SCNC: 1.4 MMOL/L
APPEARANCE FLUID: NORMAL
AST SERPL-CCNC: 27 U/L
BASOPHILS ABSOLUTE: 0.6 /ΜL
BASOPHILS RELATIVE PERCENT: 32 %
BILIRUB SERPL-MCNC: 0.7 MG/DL (ref 0.1–1.4)
BILIRUBIN, POC: NORMAL
BLOOD URINE, POC: NORMAL
BUN BLDV-MCNC: 18 MG/DL
C-REACTIVE PROTEIN: <3
CALCIUM SERPL-MCNC: 9.5 MG/DL
CHLORIDE BLD-SCNC: 100 MMOL/L
CHOLESTEROL, TOTAL: 132 MG/DL
CHOLESTEROL/HDL RATIO: 3.4
CLARITY, POC: CLEAR
CO2: 26 MMOL/L
COLOR, POC: YELLOW
CREAT SERPL-MCNC: 1.16 MG/DL
EGFR: 66
EOSINOPHILS ABSOLUTE: 3.7 /ΜL
EOSINOPHILS RELATIVE PERCENT: 200 %
ESTIMATED AVERAGE GLUCOSE: NORMAL
GLUCOSE BLD-MCNC: 107 MG/DL
GLUCOSE URINE, POC: NORMAL
HBA1C MFR BLD: 5.7 %
HCT VFR BLD CALC: 38.5 % (ref 41–53)
HDLC SERPL-MCNC: 39 MG/DL (ref 35–70)
HEMOGLOBIN: 12.8 G/DL (ref 13.5–17.5)
KETONES, POC: NORMAL
LDL CHOLESTEROL CALCULATED: 68 MG/DL (ref 0–160)
LEUKOCYTE EST, POC: NORMAL
LYMPHOCYTES ABSOLUTE: 27.1 /ΜL
LYMPHOCYTES RELATIVE PERCENT: 1463 %
MCH RBC QN AUTO: 30.5 PG
MCHC RBC AUTO-ENTMCNC: 33.2 G/DL
MCV RBC AUTO: 91.7 FL
MONOCYTES ABSOLUTE: 12.7 /ΜL
MONOCYTES RELATIVE PERCENT: 686 %
NEUTROPHILS ABSOLUTE: 55.9 /ΜL
NEUTROPHILS RELATIVE PERCENT: 3019 %
NITRITE, POC: NORMAL
NONHDLC SERPL-MCNC: 93 MG/DL
PDW BLD-RTO: 12.4 %
PH, POC: 6
PLATELET # BLD: 272 K/ΜL
PMV BLD AUTO: 10.2 FL
POTASSIUM SERPL-SCNC: 5 MMOL/L
PROTEIN, POC: NORMAL
RBC # BLD: 4.2 10^6/ΜL
RHEUMATOID FACTOR: <10
SEDIMENTATION RATE, ERYTHROCYTE: 9
SODIUM BLD-SCNC: 134 MMOL/L
SPECIFIC GRAVITY, POC: 1.01
T3 FREE: 3.1
T4 FREE: 1.2
TOTAL PROTEIN: 7.2
TRIGL SERPL-MCNC: 168 MG/DL
TSH SERPL DL<=0.05 MIU/L-ACNC: 2.95 UIU/ML
URIC ACID: 6.7
UROBILINOGEN, POC: NORMAL
VITAMIN B-12: 702
VITAMIN D 25-HYDROXY: 46
VITAMIN D2, 25 HYDROXY: NORMAL
VITAMIN D3,25 HYDROXY: NORMAL
VLDLC SERPL CALC-MCNC: NORMAL MG/DL
WBC # BLD: 5.4 10^3/ML

## 2024-05-08 ENCOUNTER — APPOINTMENT (OUTPATIENT)
Dept: CARDIOLOGY | Facility: CLINIC | Age: 74
End: 2024-05-08
Payer: MEDICARE

## 2024-05-13 ASSESSMENT — PATIENT HEALTH QUESTIONNAIRE - PHQ9
1. LITTLE INTEREST OR PLEASURE IN DOING THINGS: NOT AT ALL
SUM OF ALL RESPONSES TO PHQ QUESTIONS 1-9: 0
SUM OF ALL RESPONSES TO PHQ QUESTIONS 1-9: 0
SUM OF ALL RESPONSES TO PHQ9 QUESTIONS 1 & 2: 0
2. FEELING DOWN, DEPRESSED OR HOPELESS: NOT AT ALL
SUM OF ALL RESPONSES TO PHQ9 QUESTIONS 1 & 2: 0
SUM OF ALL RESPONSES TO PHQ QUESTIONS 1-9: 0
SUM OF ALL RESPONSES TO PHQ QUESTIONS 1-9: 0
1. LITTLE INTEREST OR PLEASURE IN DOING THINGS: NOT AT ALL
2. FEELING DOWN, DEPRESSED OR HOPELESS: NOT AT ALL

## 2024-05-14 ENCOUNTER — OFFICE VISIT (OUTPATIENT)
Dept: CARDIOLOGY | Facility: CLINIC | Age: 74
End: 2024-05-14
Payer: MEDICARE

## 2024-05-14 VITALS
DIASTOLIC BLOOD PRESSURE: 86 MMHG | HEIGHT: 73 IN | HEART RATE: 74 BPM | SYSTOLIC BLOOD PRESSURE: 126 MMHG | BODY MASS INDEX: 29.87 KG/M2 | WEIGHT: 225.4 LBS

## 2024-05-14 DIAGNOSIS — I25.10 ARTERIOSCLEROSIS OF CORONARY ARTERY: ICD-10-CM

## 2024-05-14 DIAGNOSIS — E78.2 MIXED HYPERLIPIDEMIA: ICD-10-CM

## 2024-05-14 DIAGNOSIS — M79.10 MYALGIA: ICD-10-CM

## 2024-05-14 DIAGNOSIS — I10 PRIMARY HYPERTENSION: ICD-10-CM

## 2024-05-14 DIAGNOSIS — I10 HYPERTENSION, UNSPECIFIED TYPE: ICD-10-CM

## 2024-05-14 DIAGNOSIS — Z78.9 STATIN INTOLERANCE: ICD-10-CM

## 2024-05-14 PROCEDURE — 3074F SYST BP LT 130 MM HG: CPT | Performed by: INTERNAL MEDICINE

## 2024-05-14 PROCEDURE — 99214 OFFICE O/P EST MOD 30 MIN: CPT | Performed by: INTERNAL MEDICINE

## 2024-05-14 PROCEDURE — 1036F TOBACCO NON-USER: CPT | Performed by: INTERNAL MEDICINE

## 2024-05-14 PROCEDURE — 3079F DIAST BP 80-89 MM HG: CPT | Performed by: INTERNAL MEDICINE

## 2024-05-14 PROCEDURE — 1159F MED LIST DOCD IN RCRD: CPT | Performed by: INTERNAL MEDICINE

## 2024-05-14 RX ORDER — NITROGLYCERIN 0.4 MG/1
0.4 TABLET SUBLINGUAL EVERY 5 MIN PRN
Qty: 25 TABLET | Refills: 5 | Status: SHIPPED | OUTPATIENT
Start: 2024-05-14

## 2024-05-14 RX ORDER — AMLODIPINE BESYLATE 5 MG/1
5 TABLET ORAL DAILY
Qty: 90 TABLET | Refills: 3 | Status: SHIPPED | OUTPATIENT
Start: 2024-05-14

## 2024-05-14 RX ORDER — INCLISIRAN 284 MG/1.5ML
284 INJECTION, SOLUTION SUBCUTANEOUS
Qty: 2 EACH | Refills: 3 | Status: SHIPPED | OUTPATIENT
Start: 2024-05-14

## 2024-05-14 RX ORDER — CARVEDILOL 3.12 MG/1
3.12 TABLET ORAL
Qty: 180 TABLET | Refills: 3 | Status: SHIPPED | OUTPATIENT
Start: 2024-05-14

## 2024-05-14 NOTE — PROGRESS NOTES
Most recently seen August 2023.    Subjective :     Continues to report not feeling well.  Symptoms are the same as prior visits, diffuse muscle aches and pains.  Wants to know if he has venous insufficiency.  Denies chest pressure tightness heaviness or palpitations troponin blood work which I reviewed.  Remains active.  Appears healthy.  No weight loss.  No weight gain.    History so Far :    1. Atherosclerotic native vessel coronary artery disease.  Inferior-functional class I.  2. Inferior ST-segment elevation myocardial infarction October 2017-underwent emergent percutaneous coronary intervention and  drug-eluting stent to the right coronary artery at [facility].  3. Remote inferior wall myocardial infarction, at which time, the  patient received 4.0 x 28 mm Vision bare metal stent that was post  dilated with a 4.5-mm noncompliant balloon (right coronary artery).  4. Non-flow limiting disease, left system.  5. Preserved left ventricular ejection fraction. Left atrial size  4.2 cm by echo May 2016.  6. Dyslipidemia, LDL goal 70 or below, intolerance to statins.  7. Hypertension-controlled.  8. Statin intolerance-currently pt off Praluent and refusing to go on it .tolerating Had excellent  response to Praluent. Was prescribed bempedoic acid by physician in [place]  9. PVR rest and exercise October 2020-  10. Lower extremity arterial duplex October 2020-no evidence of hemodynamically significant arterial stenosis in the lower extremities bilaterally  11. Is Myoview 2019-10.1 mets 69% age-predicted maximum heart rate baseline blood pressure 146/70 excellent blood pressure 164/60, switched to Lexiscan Myoview LVEF 51% normal perfusion.  12. Chronic muscle pain different types, no objective data to go along with it, including inflammatory markers which were all negative.  13. Stress Myoview February 2023-fixed inferior wall defect with no reversibility LVEF 52% no diagnostic ST-T abnormality with  "exercise, chronotropic blunting, achieved 80% of age-predicted maximum heart rate  Objective   Wt Readings from Last 3 Encounters:   05/14/24 102 kg (225 lb 6.4 oz)   08/08/23 102 kg (224 lb)   08/02/22 101 kg (223 lb 6 oz)            Vitals:    05/14/24 1530   BP: 126/86   BP Location: Left arm   Patient Position: Sitting   Pulse: 74   Weight: 102 kg (225 lb 6.4 oz)   Height: 1.854 m (6' 1\")                Physical Exam:    GENERAL APPEARANCE: in no acute distress.  CHEST: Symmetric and non-tender.  INTEGUMENT: Skin warm and dry  HEENT: No gross abnormalities identified.No pallor or scleral icterus.  NECK: Supple, no JVD, no bruit.   NEURO/PSHCY: Alert and oriented x3; appropriate behavior and responses and responses  LUNGS: Clear to auscultation bilaterally; normal respiratory effort.  HEART: Rate and rhythm regular with no evident murmur; no gallop appreciated.   ABDOMEN: Soft, non tender.  MUSCULOSKELETAL: No gross deformities.  No focal tenderness  EXTREMITIES: Warm  There is no edema noted.  Distal pulses are strong to palpation and symmetric    Meds:  Current Outpatient Medications   Medication Instructions    amLODIPine (NORVASC) 5 mg, oral, Daily    aspirin 81 mg EC tablet 1 tablet, oral, Daily    carvedilol (COREG) 3.125 mg, oral, 2 times daily (morning and late afternoon)    cyanocobalamin (Vitamin B-12) 1,000 mcg tablet 1 tablet, oral, Daily    Leqvio 284 mg, subcutaneous, Every 3 months    multivitamin tablet 1 tablet, oral, Daily    nitroglycerin (NITROSTAT) 0.4 mg, sublingual, Every 5 min PRN    Synthroid 100 mcg tablet 1 tablet, oral, Daily          Allergies   Allergen Reactions    Ticagrelor Shortness of breath    Ezetimibe Unknown    Other Unknown    Rosuvastatin Unknown    Simvastatin Unknown             LABS:    Lab Results   Component Value Date    HGBA1C 5.6 09/14/2020 April 2024-total cholesterol 132 HDL 39 triglycerides 168 LDL 68 glucose 107 BUN 18 creatinine 1.16 GFR 66 " sodium 134 potassium 5.0 liver enzymes normal hemoglobin A1c 5.7 hemoglobin 12.8 hematocrit 38.5 white blood cell count 5.4 platelet count 2 72,000    MICHAEL screen was reported to be positive MICHAEL titer 1 is to 40 MICHAEL pattern cytoplasmic TSH 2.95 T41.2 T33.1 Sed rate 9 vitamin D level 46 vitamin B12 702 rheumatoid factor less than 10 C-reactive protein less than 3      Patient Active Problem List    Diagnosis Date Noted    Statin intolerance 05/14/2024    Anemia 12/27/2023    Arteriosclerosis of coronary artery 12/27/2023    Cervical dysphagia 12/27/2023    Clonic hemifacial spasm, right 12/27/2023    Hyperlipidemia 12/27/2023    Hypertension 12/27/2023    Myalgia 12/27/2023                 Assessment:    1. Myalgia  Follow Up In Cardiology    Referral to Neurology    Comprehensive Metabolic Panel    Lipid Panel    inclisiran (Leqvio) injection      2. Mixed hyperlipidemia  Follow Up In Cardiology    Comprehensive Metabolic Panel    Lipid Panel    inclisiran (Leqvio) injection      3. Primary hypertension  Follow Up In Cardiology    Comprehensive Metabolic Panel    Lipid Panel    amLODIPine (Norvasc) 5 mg tablet    carvedilol (Coreg) 3.125 mg tablet      4. Statin intolerance  Follow Up In Cardiology    Comprehensive Metabolic Panel    Lipid Panel    inclisiran (Leqvio) injection      5. Hypertension, unspecified type  Follow Up In Cardiology    Comprehensive Metabolic Panel    Lipid Panel    carvedilol (Coreg) 3.125 mg tablet      6. Arteriosclerosis of coronary artery  nitroglycerin (Nitrostat) 0.4 mg SL tablet    Comprehensive Metabolic Panel    Lipid Panel    inclisiran (Leqvio) injection         This is a patient with multiple cardiac risk factors, statin intolerance, currently on Leqvio, who continues to have diffuse muscle aches and pains, without evidence of myositis on examination.  I will refer patient to neurology, Dr. Ines Soto,?  Muscle biopsy,?  EMG and nerve conduction studies  No change in cardiac  medications  No clinical evidence of flow-limiting coronary artery disease at this time  Follow up : 1 year    Comprehensive profile and lipid profile prior to next visit    Provider Attestation - Scribe documentation    All medical record entries made by the Scribe were at my direction and personally dictated by me. I have reviewed the chart and agree that the record accurately reflects my personal performance of the history, physical exam, discussion and plan.       Scribe Attestation  By signing my name below, I, Natalya Frazier MD , Olga Lidiaibe   attest that this documentation has been prepared under the direction and in the presence of Natalya Frazier MD.

## 2024-05-14 NOTE — PATIENT INSTRUCTIONS
Labs to be done prior to next appointment   is referring you to , Neurology for myalgias    Please bring all medicines, vitamins, and herbal supplements with you in original bottles to every appointment!!!!    -Prescriptions will not be filled unless you are compliant with your follow up appointments or have a follow up appointment scheduled as per instruction of your physician. Refills should be requested at the time of your visit.

## 2024-05-15 ENCOUNTER — OFFICE VISIT (OUTPATIENT)
Dept: FAMILY MEDICINE CLINIC | Age: 74
End: 2024-05-15
Payer: MEDICARE

## 2024-05-15 VITALS
RESPIRATION RATE: 17 BRPM | OXYGEN SATURATION: 98 % | SYSTOLIC BLOOD PRESSURE: 112 MMHG | DIASTOLIC BLOOD PRESSURE: 68 MMHG | HEIGHT: 76 IN | BODY MASS INDEX: 27.27 KG/M2 | HEART RATE: 80 BPM

## 2024-05-15 DIAGNOSIS — I73.9 CLAUDICATION (HCC): Primary | ICD-10-CM

## 2024-05-15 DIAGNOSIS — J84.10 LUNG GRANULOMA (HCC): ICD-10-CM

## 2024-05-15 PROCEDURE — 1036F TOBACCO NON-USER: CPT | Performed by: FAMILY MEDICINE

## 2024-05-15 PROCEDURE — 3078F DIAST BP <80 MM HG: CPT | Performed by: FAMILY MEDICINE

## 2024-05-15 PROCEDURE — 1123F ACP DISCUSS/DSCN MKR DOCD: CPT | Performed by: FAMILY MEDICINE

## 2024-05-15 PROCEDURE — G8417 CALC BMI ABV UP PARAM F/U: HCPCS | Performed by: FAMILY MEDICINE

## 2024-05-15 PROCEDURE — 99214 OFFICE O/P EST MOD 30 MIN: CPT | Performed by: FAMILY MEDICINE

## 2024-05-15 PROCEDURE — 3074F SYST BP LT 130 MM HG: CPT | Performed by: FAMILY MEDICINE

## 2024-05-15 PROCEDURE — 3017F COLORECTAL CA SCREEN DOC REV: CPT | Performed by: FAMILY MEDICINE

## 2024-05-15 PROCEDURE — G8427 DOCREV CUR MEDS BY ELIG CLIN: HCPCS | Performed by: FAMILY MEDICINE

## 2024-05-15 NOTE — PROGRESS NOTES
by mouth 2 times daily      Inclisiran Sodium (LEQVIO) 284 MG/1.5ML       Cyanocobalamin (B-12 SUPER STRENGTH) 5000 MCG/ML LIQD Place under the tongue      naphazoline-pheniramine (NAPHCON-A) 0.025-0.3 % ophthalmic solution Place 1 drop into both eyes 4 times daily 1 Bottle 3    Multiple Vitamin (MULTIVITAMIN ADULT PO) Take by mouth      carvedilol (COREG) 3.125 MG tablet Take 1 tablet by mouth 2 times daily      nitroGLYCERIN (NITROSTAT) 0.4 MG SL tablet Place 1 tablet under the tongue every 5 minutes as needed for Chest pain up to max of 3 total doses. If no relief after 1 dose, call 911. 25 tablet 3    aspirin 81 MG tablet Take 1 tablet by mouth daily       No current facility-administered medications on file prior to visit.     Allergies   Allergen Reactions    Other Other (See Comments)     Leg cramps    Statins      Leg cramps    Propofol Rash     Health Maintenance   Topic Date Due    Respiratory Syncytial Virus (RSV) Pregnant or age 60 yrs+ (1 - 1-dose 60+ series) Never done    Annual Wellness Visit (Medicare Advantage)  01/01/2024    A1C test (Diabetic or Prediabetic)  02/17/2024    Colorectal Cancer Screen  02/18/2025    Depression Screen  05/13/2025    Lipids  07/28/2028    DTaP/Tdap/Td vaccine (2 - Td or Tdap) 08/15/2028    Flu vaccine  Completed    Shingles vaccine  Completed    Pneumococcal 65+ years Vaccine  Completed    COVID-19 Vaccine  Completed    AAA screen  Completed    Hepatitis C screen  Completed    Hepatitis A vaccine  Aged Out    Hepatitis B vaccine  Aged Out    Hib vaccine  Aged Out    Polio vaccine  Aged Out    Meningococcal (ACWY) vaccine  Aged Out       Review of Systems     Review of Systems    Physical Exam  Vitals:    05/15/24 0818   BP: 112/68   Pulse: 80   Resp: 17   SpO2: 98%   Height: 1.93 m (6' 4\")       Physical Exam  Constitutional:       Appearance: He is well-developed.   HENT:      Right Ear: External ear normal.      Left Ear: External ear normal.   Eyes:

## 2024-05-17 ENCOUNTER — APPOINTMENT (OUTPATIENT)
Dept: CARDIOLOGY | Facility: CLINIC | Age: 74
End: 2024-05-17
Payer: MEDICARE

## 2024-05-28 ENCOUNTER — TELEPHONE (OUTPATIENT)
Dept: CARDIOLOGY | Facility: CLINIC | Age: 74
End: 2024-05-28
Payer: MEDICARE

## 2024-05-28 NOTE — TELEPHONE ENCOUNTER
Received call from patient stating at his office visit with Dr. Frazier 5/14/24 that he is to start Leqvio injections. He stated that the Infusion center is requesting the order to be fax to them at [phone] to proceed with the injections,    Routed to Brandi in absence of Dr. Frazier for review.

## 2024-05-29 NOTE — TELEPHONE ENCOUNTER
Discussed with Brandi in office. This task is complete.   May see after order for Leqvio under ACTIVE THERAPY PLANS under storyboard on left hand side of screen. (This med does not get sent to a pharmacy).

## 2024-06-04 ENCOUNTER — HOSPITAL ENCOUNTER (OUTPATIENT)
Dept: ULTRASOUND IMAGING | Age: 74
Discharge: HOME | End: 2024-06-06
Payer: MEDICARE

## 2024-06-04 DIAGNOSIS — I73.9 CLAUDICATION (HCC): ICD-10-CM

## 2024-06-04 PROCEDURE — 93922 UPR/L XTREMITY ART 2 LEVELS: CPT

## 2024-06-06 ENCOUNTER — OFFICE VISIT (OUTPATIENT)
Dept: PULMONOLOGY | Age: 74
End: 2024-06-06
Payer: MEDICARE

## 2024-06-06 VITALS
TEMPERATURE: 97.7 F | OXYGEN SATURATION: 99 % | WEIGHT: 223.2 LBS | SYSTOLIC BLOOD PRESSURE: 142 MMHG | RESPIRATION RATE: 18 BRPM | HEIGHT: 76 IN | DIASTOLIC BLOOD PRESSURE: 67 MMHG | BODY MASS INDEX: 27.18 KG/M2 | HEART RATE: 54 BPM

## 2024-06-06 DIAGNOSIS — R06.09 DOE (DYSPNEA ON EXERTION): Primary | ICD-10-CM

## 2024-06-06 DIAGNOSIS — J84.10 LUNG GRANULOMA (HCC): ICD-10-CM

## 2024-06-06 DIAGNOSIS — M62.81 MUSCLE WEAKNESS: ICD-10-CM

## 2024-06-06 PROCEDURE — 1123F ACP DISCUSS/DSCN MKR DOCD: CPT | Performed by: INTERNAL MEDICINE

## 2024-06-06 PROCEDURE — 3017F COLORECTAL CA SCREEN DOC REV: CPT | Performed by: INTERNAL MEDICINE

## 2024-06-06 PROCEDURE — 99214 OFFICE O/P EST MOD 30 MIN: CPT | Performed by: INTERNAL MEDICINE

## 2024-06-06 PROCEDURE — 3077F SYST BP >= 140 MM HG: CPT | Performed by: INTERNAL MEDICINE

## 2024-06-06 PROCEDURE — G8417 CALC BMI ABV UP PARAM F/U: HCPCS | Performed by: INTERNAL MEDICINE

## 2024-06-06 PROCEDURE — 3078F DIAST BP <80 MM HG: CPT | Performed by: INTERNAL MEDICINE

## 2024-06-06 PROCEDURE — 1036F TOBACCO NON-USER: CPT | Performed by: INTERNAL MEDICINE

## 2024-06-06 PROCEDURE — G8427 DOCREV CUR MEDS BY ELIG CLIN: HCPCS | Performed by: INTERNAL MEDICINE

## 2024-06-06 NOTE — PROGRESS NOTES
Subjective:     Isidro Kaye is a 73 y.o. male who complains today of:     Chief Complaint   Patient presents with    Follow-up     Shortness of Breath and Lung granuloma per Dr. Shane       HPI  Patient presents for shortness of breath    6/6/2024  Patient complains of dyspnea on exertion, he report his symptoms as legs weakness and tightness, mainly with exertion, he is able to bike for almost 6 miles but initially at the beginning of the activity he feels his legs are tight and he has to stop.  He has no chest pain, no coughing, no fever no chills, no wheezing, his weight is stable, no skin rash.  He was evaluated by neurology Dr. Hahn and rheumatology in [place] and workup has been negative I do not have access to the results.  He was previously seen by me and had a PFT which was normal.  He has no symptoms suggestive of sleep apnea.             Allergies:  Ticagrelor, Ezetimibe, Other, Statins, and Propofol  Past Medical History:   Diagnosis Date    CAD (coronary artery disease)     Congenital heart disease     Coronary artery disease     Heart attack (HCC) 10/30/2017    Hyperlipidemia     Hypertension     Hypothyroidism     Knee pain     MI (myocardial infarction) ([facility]) 2008     Past Surgical History:   Procedure Laterality Date    CARDIAC CATHETERIZATION  05/13/16    DR HOLT    CORONARY ANGIOPLASTY WITH STENT PLACEMENT  2008    VASECTOMY       Family History   Problem Relation Age of Onset    Heart Disease Father     Coronary Art Dis Father      Social History     Socioeconomic History    Marital status:      Spouse name: Not on file    Number of children: Not on file    Years of education: Not on file    Highest education level: Not on file   Occupational History    Not on file   Tobacco Use    Smoking status: Former     Current packs/day: 0.00     Average packs/day: 1.5 packs/day for 20.0 years (30.0 ttl pk-yrs)     Types: Cigarettes     Start date: 1962     Quit date: 1982     Years since

## 2024-06-07 ENCOUNTER — DOCUMENTATION (OUTPATIENT)
Dept: INFUSION THERAPY | Facility: CLINIC | Age: 74
End: 2024-06-07
Payer: MEDICARE

## 2024-06-07 DIAGNOSIS — E78.2 MIXED HYPERLIPIDEMIA: Primary | ICD-10-CM

## 2024-06-07 DIAGNOSIS — Z78.9 STATIN INTOLERANCE: ICD-10-CM

## 2024-06-07 RX ORDER — DIPHENHYDRAMINE HYDROCHLORIDE 50 MG/ML
50 INJECTION INTRAMUSCULAR; INTRAVENOUS AS NEEDED
OUTPATIENT
Start: 2024-06-07

## 2024-06-07 RX ORDER — EPINEPHRINE 0.3 MG/.3ML
0.3 INJECTION SUBCUTANEOUS EVERY 5 MIN PRN
OUTPATIENT
Start: 2024-06-07

## 2024-06-07 RX ORDER — FAMOTIDINE 10 MG/ML
20 INJECTION INTRAVENOUS ONCE AS NEEDED
OUTPATIENT
Start: 2024-06-07

## 2024-06-07 RX ORDER — ALBUTEROL SULFATE 0.83 MG/ML
3 SOLUTION RESPIRATORY (INHALATION) AS NEEDED
OUTPATIENT
Start: 2024-06-07

## 2024-06-07 NOTE — PROGRESS NOTES
Patient to be scheduled for Continuation of Leqvio injections.   For Diagnosis: Hypercholesteremia     Labs…  Component  Ref Range & Units 10 mo ago   Cholesterol, Total  0 - 199 mg/dL 134   Comment: ATP III Cholesterol classification is Desirable.   Triglycerides  0 - 150 mg/dL 152 High    Comment: ATP III Triglycerides Classification is Borderline High.   HDL  40 - 59 mg/dL 36 Low    Comment: ATP III HDL Cholestrol Classification is low.  Expected Values:    Males:    >55 = No Risk            35-55 = Moderate Risk            <35 = High Risk    Females:  >65 = No Risk            45-65 = Moderate Risk            <45 = High Risk    NCEP Guidelines:  Third Report May 2001  >59 = negative risk factor for CHD  <40 = major risk factor for CHD   LDL Calculated  0 - 129 mg/dL 68   Comment: ATP III LDL Classification is Optimal.   Resulting Agency [facility]   Narrative  Performed by [facility]  CALL  doctor   tel. [phone], fax result o [phone]      Specimen Collected: 07/28/23 08:11       Last injection received: 6MO AGO (if continuation)  Due: NOW    Start date: anytime after insurance approval. Patient will be called to schedule the appts after insurance approval.    This result meets treatment criteria.     [facility])  [facility]  [address] [place] [address] [address]  [place] [address]  Phone: [phone]  Fax: [phone]  [email or web address]

## 2024-06-14 ENCOUNTER — TELEPHONE (OUTPATIENT)
Dept: FAMILY MEDICINE CLINIC | Age: 74
End: 2024-06-14

## 2024-06-14 NOTE — TELEPHONE ENCOUNTER
Patient called and stated that he has an appt on Wednesday, June 19 with Dr. Shane and wanted to know if the lab work could be ordered ahead of time so that Dr. Shane would have results on the day of his appointment, and they can start any kind of treatment, pain meds, etc. Instead of waiting until the day of the appt to have the blood work done and then having to wait a couple of days for results.      If labs are ordered ahead of time, please contact patient and let him know so he can come in to have that done on Monday.      Patient may be reached at [phone]

## 2024-06-19 ENCOUNTER — OFFICE VISIT (OUTPATIENT)
Dept: FAMILY MEDICINE CLINIC | Age: 74
End: 2024-06-19
Payer: MEDICARE

## 2024-06-19 VITALS
HEIGHT: 76 IN | HEART RATE: 58 BPM | BODY MASS INDEX: 27.17 KG/M2 | DIASTOLIC BLOOD PRESSURE: 62 MMHG | SYSTOLIC BLOOD PRESSURE: 124 MMHG | RESPIRATION RATE: 16 BRPM | OXYGEN SATURATION: 95 %

## 2024-06-19 DIAGNOSIS — M60.9 MYOSITIS OF MULTIPLE SITES, UNSPECIFIED MYOSITIS TYPE: Primary | ICD-10-CM

## 2024-06-19 DIAGNOSIS — M35.3 PMR (POLYMYALGIA RHEUMATICA) (HCC): ICD-10-CM

## 2024-06-19 LAB — ERYTHROCYTE [SEDIMENTATION RATE] IN BLOOD BY WESTERGREN METHOD: 36 MM (ref 0–20)

## 2024-06-19 PROCEDURE — 3078F DIAST BP <80 MM HG: CPT | Performed by: FAMILY MEDICINE

## 2024-06-19 PROCEDURE — 3074F SYST BP LT 130 MM HG: CPT | Performed by: FAMILY MEDICINE

## 2024-06-19 PROCEDURE — 1036F TOBACCO NON-USER: CPT | Performed by: FAMILY MEDICINE

## 2024-06-19 PROCEDURE — G8417 CALC BMI ABV UP PARAM F/U: HCPCS | Performed by: FAMILY MEDICINE

## 2024-06-19 PROCEDURE — 3017F COLORECTAL CA SCREEN DOC REV: CPT | Performed by: FAMILY MEDICINE

## 2024-06-19 PROCEDURE — 1123F ACP DISCUSS/DSCN MKR DOCD: CPT | Performed by: FAMILY MEDICINE

## 2024-06-19 PROCEDURE — G8427 DOCREV CUR MEDS BY ELIG CLIN: HCPCS | Performed by: FAMILY MEDICINE

## 2024-06-19 PROCEDURE — 99214 OFFICE O/P EST MOD 30 MIN: CPT | Performed by: FAMILY MEDICINE

## 2024-06-19 RX ORDER — METHYLPREDNISOLONE 4 MG/1
TABLET ORAL
Qty: 1 KIT | Refills: 0 | Status: SHIPPED | OUTPATIENT
Start: 2024-06-19 | End: 2024-06-19 | Stop reason: ALTCHOICE

## 2024-06-19 RX ORDER — METHYLPREDNISOLONE 4 MG/1
TABLET ORAL
Qty: 1 KIT | Refills: 1 | Status: SHIPPED | OUTPATIENT
Start: 2024-06-19 | End: 2024-06-25

## 2024-06-19 SDOH — ECONOMIC STABILITY: FOOD INSECURITY: WITHIN THE PAST 12 MONTHS, YOU WORRIED THAT YOUR FOOD WOULD RUN OUT BEFORE YOU GOT MONEY TO BUY MORE.: NEVER TRUE

## 2024-06-19 SDOH — ECONOMIC STABILITY: INCOME INSECURITY: HOW HARD IS IT FOR YOU TO PAY FOR THE VERY BASICS LIKE FOOD, HOUSING, MEDICAL CARE, AND HEATING?: NOT HARD AT ALL

## 2024-06-19 SDOH — ECONOMIC STABILITY: FOOD INSECURITY: WITHIN THE PAST 12 MONTHS, THE FOOD YOU BOUGHT JUST DIDN'T LAST AND YOU DIDN'T HAVE MONEY TO GET MORE.: NEVER TRUE

## 2024-06-19 ASSESSMENT — ENCOUNTER SYMPTOMS
ALLERGIC/IMMUNOLOGIC NEGATIVE: 1
GASTROINTESTINAL NEGATIVE: 1
SHORTNESS OF BREATH: 0
EYES NEGATIVE: 1
RESPIRATORY NEGATIVE: 1

## 2024-06-19 NOTE — PROGRESS NOTES
Patient is seen in follow up for   Chief Complaint   Patient presents with    Joint Pain     Multiple sites     Joint Pain   Pertinent negatives include no fever.   HERE FOR FOLLOW UP ON JOINT PAIN STEROIDS HAS HELPED.    Past Medical History:   Diagnosis Date    CAD (coronary artery disease)     Congenital heart disease     Coronary artery disease     Heart attack ([facility]) 10/30/2017    Hyperlipidemia     Hypertension     Hypothyroidism     Knee pain     MI (myocardial infarction) ([facility])      Patient Active Problem List    Diagnosis Date Noted    Stented coronary artery 2020    ST elevation myocardial infarction involving left anterior descending (LAD) coronary artery ([facility])     Hyperlipidemia 2023    PMR (polymyalgia rheumatica) ([facility]) 2020    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    Unstable angina ([facility]) 2020    Chest pain 2020    Hypothyroid 2020    HTN (hypertension) 2020    CAD (coronary artery disease)     Congenital heart disease     Knee pain      Past Surgical History:   Procedure Laterality Date    CARDIAC CATHETERIZATION  14    DR HOLT    CORONARY ANGIOPLASTY WITH STENT PLACEMENT  2008    VASECTOMY       Family History   Problem Relation Age of Onset    Heart Disease Father     Coronary Art Dis Father      Social History     Socioeconomic History    Marital status:      Spouse name: None    Number of children: None    Years of education: None    Highest education level: None   Tobacco Use    Smoking status: Former     Current packs/day: 0.00     Average packs/day: 1.5 packs/day for 20.0 years (30.0 ttl pk-yrs)     Types: Cigarettes     Start date:      Quit date:      Years since quittin.4    Smokeless tobacco: Never   Vaping Use    Vaping Use: Never used   Substance and Sexual Activity    Alcohol use: Yes     Alcohol/week: 2.0 standard drinks of alcohol     Types: 1 Glasses of wine, 1 Cans of beer per week     Comment: rarely     Drug use: No

## 2024-06-21 ENCOUNTER — TELEMEDICINE (OUTPATIENT)
Dept: FAMILY MEDICINE CLINIC | Age: 74
End: 2024-06-21
Payer: MEDICARE

## 2024-06-21 DIAGNOSIS — Z00.00 MEDICARE ANNUAL WELLNESS VISIT, SUBSEQUENT: Primary | ICD-10-CM

## 2024-06-21 DIAGNOSIS — Z13.6 SCREENING FOR AAA (ABDOMINAL AORTIC ANEURYSM): ICD-10-CM

## 2024-06-21 LAB — NUCLEAR IGG SER QL IA: NORMAL

## 2024-06-21 PROCEDURE — 3017F COLORECTAL CA SCREEN DOC REV: CPT | Performed by: NURSE PRACTITIONER

## 2024-06-21 PROCEDURE — G0439 PPPS, SUBSEQ VISIT: HCPCS | Performed by: NURSE PRACTITIONER

## 2024-06-21 PROCEDURE — 1123F ACP DISCUSS/DSCN MKR DOCD: CPT | Performed by: NURSE PRACTITIONER

## 2024-06-21 ASSESSMENT — PATIENT HEALTH QUESTIONNAIRE - PHQ9
SUM OF ALL RESPONSES TO PHQ QUESTIONS 1-9: 0
1. LITTLE INTEREST OR PLEASURE IN DOING THINGS: NOT AT ALL
SUM OF ALL RESPONSES TO PHQ9 QUESTIONS 1 & 2: 0
SUM OF ALL RESPONSES TO PHQ QUESTIONS 1-9: 0
2. FEELING DOWN, DEPRESSED OR HOPELESS: NOT AT ALL
SUM OF ALL RESPONSES TO PHQ QUESTIONS 1-9: 0
SUM OF ALL RESPONSES TO PHQ QUESTIONS 1-9: 0

## 2024-06-21 ASSESSMENT — LIFESTYLE VARIABLES
HOW MANY STANDARD DRINKS CONTAINING ALCOHOL DO YOU HAVE ON A TYPICAL DAY: PATIENT DOES NOT DRINK
HOW OFTEN DO YOU HAVE A DRINK CONTAINING ALCOHOL: NEVER

## 2024-06-21 NOTE — PROGRESS NOTES
Medicare Annual Wellness Visit    Isidro Kaye is here for Medicare AWV    Assessment & Plan   Medicare annual wellness visit, subsequent  Screening for AAA (abdominal aortic aneurysm)  -     Vascular AAA screening; Future    Recommendations for Preventive Services Due: see orders and patient instructions/AVS.  Recommended screening schedule for the next 5-10 years is provided to the patient in written form: see Patient Instructions/AVS.     Return in 1 year (on 6/21/2025).     Subjective       Patient's complete Health Risk Assessment and screening values have been reviewed and are found in Flowsheets. The following problems were reviewed today and where indicated follow up appointments were made and/or referrals ordered.    Positive Risk Factor Screenings with Interventions:               General HRA Questions:  Select all that apply: (!) New or Increased Pain    Pain Interventions:  Patient declined any further interventions or treatment  Had appointment with PCP yesterday started on steroid joey states it is helping already                          Objective      Patient-Reported Vitals  No data recorded          Allergies   Allergen Reactions    Ticagrelor Shortness Of Breath    Ezetimibe Other (See Comments)    Other Other (See Comments)     Leg cramps    Statins      Leg cramps    Propofol Rash     Prior to Visit Medications    Medication Sig Taking? Authorizing Provider   methylPREDNISolone (MEDROL DOSEPACK) 4 MG tablet Take by mouth.  Rui Shane MD   amLODIPine (NORVASC) 5 MG tablet   Kimberly Cardenas MD   levothyroxine (SYNTHROID) 100 MCG tablet TAKE 1 TABLET BY MOUTH DAILY  Rui Shane MD   Inclisiran Sodium (LEQVIO) 284 MG/1.5ML   Kimberly Cardenas MD   Cyanocobalamin (B-12 SUPER STRENGTH) 5000 MCG/ML LIQD Place under the tongue  Kimberly Cardenas MD   naphazoline-pheniramine (NAPHCON-A) 0.025-0.3 % ophthalmic solution Place 1 drop into both eyes 4 times daily  Rui Shane MD

## 2024-06-27 ENCOUNTER — TELEPHONE (OUTPATIENT)
Dept: FAMILY MEDICINE CLINIC | Age: 74
End: 2024-06-27

## 2024-07-01 ENCOUNTER — TELEMEDICINE (OUTPATIENT)
Dept: FAMILY MEDICINE CLINIC | Age: 74
End: 2024-07-01
Payer: MEDICARE

## 2024-07-01 DIAGNOSIS — M60.9 MYOSITIS OF MULTIPLE SITES, UNSPECIFIED MYOSITIS TYPE: ICD-10-CM

## 2024-07-01 DIAGNOSIS — R76.8 ELEVATED ANTINUCLEAR ANTIBODY (ANA) LEVEL: ICD-10-CM

## 2024-07-01 DIAGNOSIS — M35.3 PMR (POLYMYALGIA RHEUMATICA) (HCC): Primary | ICD-10-CM

## 2024-07-01 PROCEDURE — 99213 OFFICE O/P EST LOW 20 MIN: CPT | Performed by: FAMILY MEDICINE

## 2024-07-01 PROCEDURE — G8427 DOCREV CUR MEDS BY ELIG CLIN: HCPCS | Performed by: FAMILY MEDICINE

## 2024-07-01 PROCEDURE — 3017F COLORECTAL CA SCREEN DOC REV: CPT | Performed by: FAMILY MEDICINE

## 2024-07-01 PROCEDURE — 1123F ACP DISCUSS/DSCN MKR DOCD: CPT | Performed by: FAMILY MEDICINE

## 2024-07-01 RX ORDER — PREDNISONE 10 MG/1
10 TABLET ORAL DAILY
Qty: 30 TABLET | Refills: 5 | Status: SHIPPED | OUTPATIENT
Start: 2024-07-01

## 2024-07-01 RX ORDER — DARIFENACIN 15 MG/1
15 TABLET, EXTENDED RELEASE ORAL DAILY
Qty: 30 TABLET | Refills: 3 | Status: SHIPPED | OUTPATIENT
Start: 2024-07-01

## 2024-07-01 ASSESSMENT — ENCOUNTER SYMPTOMS
SHORTNESS OF BREATH: 0
GASTROINTESTINAL NEGATIVE: 1
EYES NEGATIVE: 1
ALLERGIC/IMMUNOLOGIC NEGATIVE: 1
RESPIRATORY NEGATIVE: 1

## 2024-07-01 NOTE — PROGRESS NOTES
Patient is seen in follow up for   Chief Complaint   Patient presents with    Joint Pain     Steroids helped but as soon as done with them pain returned    Discuss Labs     Joint Pain   Pertinent negatives include no fever.   Here for follow up on joint pain was doing well on prednisone.  Documentation:  I communicated with the patient and/or health care decision maker about see below.   Details of this discussion including any medical advice provided: see below    Total Time: minutes: 11-20 minutes    Isidro Kaye was evaluated through a synchronous (real-time) audio encounter. Patient identification was verified at the start of the visit. He (or guardian if applicable) is aware that this is a billable service, which includes applicable co-pays. This visit was conducted with the patient's (and/or legal guardian's) verbal consent. He has not had a related appointment within my department in the past 7 days or scheduled within the next 24 hours.   The patient was located at Home: [address] [place] [address].  The provider was located at Facility (Appt Dept): [address] [place] [address].    Note: not billable if this call serves to triage the patient into an appointment for the relevant concern  Yes, I confirm.   Isidro Kaye is a 73 y.o. male evaluated via telephone on 7/1/2024 for Joint Pain (Steroids helped but as soon as done with them pain returned) and Discuss Labs  .        Rui Shane MD     Past Medical History:   Diagnosis Date    CAD (coronary artery disease)     Congenital heart disease     Coronary artery disease     Heart attack (HCC) 10/30/2017    Hyperlipidemia     Hypertension     Hypothyroidism     Knee pain     MI (myocardial infarction) ([facility]) 2008     Patient Active Problem List    Diagnosis Date Noted    Stented coronary artery 09/14/2020    ST elevation myocardial infarction involving left anterior descending (LAD) coronary artery ([facility])     Hyperlipidemia 06/01/2023

## 2024-07-02 ENCOUNTER — HOSPITAL ENCOUNTER (OUTPATIENT)
Dept: ULTRASOUND IMAGING | Age: 74
Discharge: HOME | End: 2024-07-04
Payer: MEDICARE

## 2024-07-02 DIAGNOSIS — Z13.6 SCREENING FOR AAA (ABDOMINAL AORTIC ANEURYSM): ICD-10-CM

## 2024-07-02 PROCEDURE — 76706 US ABDL AORTA SCREEN AAA: CPT

## 2024-07-03 ENCOUNTER — HOSPITAL ENCOUNTER (EMERGENCY)
Age: 74
Discharge: HOME | End: 2024-07-03
Attending: STUDENT IN AN ORGANIZED HEALTH CARE EDUCATION/TRAINING PROGRAM
Payer: MEDICARE

## 2024-07-03 ENCOUNTER — HOSPITAL ENCOUNTER (OUTPATIENT)
Dept: PULMONOLOGY | Age: 74
Discharge: HOME | End: 2024-07-03
Attending: INTERNAL MEDICINE
Payer: MEDICARE

## 2024-07-03 ENCOUNTER — APPOINTMENT (OUTPATIENT)
Dept: GENERAL RADIOLOGY | Age: 74
End: 2024-07-03
Payer: MEDICARE

## 2024-07-03 ENCOUNTER — HOSPITAL ENCOUNTER (OUTPATIENT)
Dept: CT IMAGING | Age: 74
Discharge: HOME | End: 2024-07-05
Attending: INTERNAL MEDICINE
Payer: MEDICARE

## 2024-07-03 VITALS
WEIGHT: 223 LBS | OXYGEN SATURATION: 99 % | HEART RATE: 52 BPM | SYSTOLIC BLOOD PRESSURE: 137 MMHG | TEMPERATURE: 97.1 F | DIASTOLIC BLOOD PRESSURE: 57 MMHG | BODY MASS INDEX: 27.16 KG/M2 | RESPIRATION RATE: 13 BRPM | HEIGHT: 76 IN

## 2024-07-03 DIAGNOSIS — R06.09 DOE (DYSPNEA ON EXERTION): ICD-10-CM

## 2024-07-03 DIAGNOSIS — R00.1 BRADYCARDIA: ICD-10-CM

## 2024-07-03 DIAGNOSIS — R42 EPISODIC LIGHTHEADEDNESS: Primary | ICD-10-CM

## 2024-07-03 LAB
ALBUMIN SERPL-MCNC: 4.3 G/DL (ref 3.5–4.6)
ALP SERPL-CCNC: 109 U/L (ref 35–104)
ALT SERPL-CCNC: 28 U/L (ref 0–41)
ANION GAP SERPL CALCULATED.3IONS-SCNC: 11 MEQ/L (ref 9–15)
AST SERPL-CCNC: 22 U/L (ref 0–40)
BASOPHILS # BLD: 0 K/UL (ref 0–0.2)
BASOPHILS NFR BLD: 0.4 %
BILIRUB SERPL-MCNC: 0.5 MG/DL (ref 0.2–0.7)
BNP BLD-MCNC: 143 PG/ML
BUN SERPL-MCNC: 21 MG/DL (ref 8–23)
CALCIUM SERPL-MCNC: 9.2 MG/DL (ref 8.5–9.9)
CHLORIDE SERPL-SCNC: 100 MEQ/L (ref 95–107)
CO2 SERPL-SCNC: 25 MEQ/L (ref 20–31)
CREAT SERPL-MCNC: 1.2 MG/DL (ref 0.7–1.2)
EOSINOPHIL # BLD: 0.5 K/UL (ref 0–0.7)
EOSINOPHIL NFR BLD: 5.7 %
ERYTHROCYTE [DISTWIDTH] IN BLOOD BY AUTOMATED COUNT: 12.5 % (ref 11.5–14.5)
GLOBULIN SER CALC-MCNC: 3.4 G/DL (ref 2.3–3.5)
GLUCOSE SERPL-MCNC: 108 MG/DL (ref 70–99)
HCT VFR BLD AUTO: 37.5 % (ref 42–52)
HGB BLD-MCNC: 12.6 G/DL (ref 14–18)
LYMPHOCYTES # BLD: 2.2 K/UL (ref 1–4.8)
LYMPHOCYTES NFR BLD: 25.8 %
MCH RBC QN AUTO: 30.7 PG (ref 27–31.3)
MCHC RBC AUTO-ENTMCNC: 33.6 % (ref 33–37)
MCV RBC AUTO: 91.5 FL (ref 79–92.2)
MONOCYTES # BLD: 0.7 K/UL (ref 0.2–0.8)
MONOCYTES NFR BLD: 8.1 %
NEUTROPHILS # BLD: 5.1 K/UL (ref 1.4–6.5)
NEUTS SEG NFR BLD: 59.8 %
PLATELET # BLD AUTO: 281 K/UL (ref 130–400)
POTASSIUM SERPL-SCNC: 4.2 MEQ/L (ref 3.4–4.9)
PROT SERPL-MCNC: 7.7 G/DL (ref 6.3–8)
RBC # BLD AUTO: 4.1 M/UL (ref 4.7–6.1)
SODIUM SERPL-SCNC: 136 MEQ/L (ref 135–144)
T4 FREE SERPL-MCNC: 1.28 NG/DL (ref 0.84–1.68)
TROPONIN, HIGH SENSITIVITY: 10 NG/L (ref 0–19)
TROPONIN, HIGH SENSITIVITY: 9 NG/L (ref 0–19)
TSH SERPL-MCNC: 2.95 UIU/ML (ref 0.44–3.86)
WBC # BLD AUTO: 8.5 K/UL (ref 4.8–10.8)

## 2024-07-03 PROCEDURE — 36415 COLL VENOUS BLD VENIPUNCTURE: CPT

## 2024-07-03 PROCEDURE — 84439 ASSAY OF FREE THYROXINE: CPT

## 2024-07-03 PROCEDURE — 93005 ELECTROCARDIOGRAM TRACING: CPT | Performed by: STUDENT IN AN ORGANIZED HEALTH CARE EDUCATION/TRAINING PROGRAM

## 2024-07-03 PROCEDURE — 83880 ASSAY OF NATRIURETIC PEPTIDE: CPT

## 2024-07-03 PROCEDURE — 71045 X-RAY EXAM CHEST 1 VIEW: CPT

## 2024-07-03 PROCEDURE — 80053 COMPREHEN METABOLIC PANEL: CPT

## 2024-07-03 PROCEDURE — 71250 CT THORAX DX C-: CPT

## 2024-07-03 PROCEDURE — 84443 ASSAY THYROID STIM HORMONE: CPT

## 2024-07-03 PROCEDURE — 84484 ASSAY OF TROPONIN QUANT: CPT

## 2024-07-03 PROCEDURE — 85025 COMPLETE CBC W/AUTO DIFF WBC: CPT

## 2024-07-03 PROCEDURE — 99285 EMERGENCY DEPT VISIT HI MDM: CPT

## 2024-07-03 PROCEDURE — 94150 VITAL CAPACITY TEST: CPT

## 2024-07-03 RX ORDER — ALBUTEROL SULFATE 2.5 MG/3ML
SOLUTION RESPIRATORY (INHALATION)
Status: DISCONTINUED
Start: 2024-07-03 | End: 2024-07-03 | Stop reason: WASHOUT

## 2024-07-03 ASSESSMENT — PAIN - FUNCTIONAL ASSESSMENT: PAIN_FUNCTIONAL_ASSESSMENT: NONE - DENIES PAIN

## 2024-07-03 ASSESSMENT — LIFESTYLE VARIABLES
HOW OFTEN DO YOU HAVE A DRINK CONTAINING ALCOHOL: NEVER
HOW MANY STANDARD DRINKS CONTAINING ALCOHOL DO YOU HAVE ON A TYPICAL DAY: PATIENT DOES NOT DRINK

## 2024-07-03 NOTE — ED PROVIDER NOTES
as hypothyroidism, hypoxia, vasovagal, dehydration, TERI    VS: /67 otherwise normal    External documentation reviewed: Reviewed outpatient telemedicine visit from 6/19/2024  Patient has a history of PMR improved with steroids also has myositis    EKG my interpretation: Sinus bradycardia rate 54 first-degree AV block, otherwise normal intervals, left axis deviation, nonspecific T waves in III, no significant ST segment changes    ED Course as of 07/03/24 1144   Wed Jul 03, 2024   0934 I have respiratory perform incentive spirometry and patient did well pulling 3500 and sustained and did not have any significant changes in his vital signs or symptoms [SF]   1024 Chest x-ray my interpretation: Slight flattening of the left diaphragm normal right, no focal consolidation no cardiomegaly, no effusion [SF]   1025 Labs my interpretation, CBC and CMP are largely unremarkable, TSH free T4 normal, initial troponin normal will repeat BNP unremarkable [SF]   1025 Patient felt well wanted to go home, will do repeat troponin, vital signs have remained in sinus bradycardia, blood pressure stable, SpO2 normal he appears well overall [SF]   1143 Repeat troponin is 10, patient felt well  I reviewed his CT scan results with him and shows may be early emphysema.  Will have him follow-up with his pulmonologist and cardiologist and given return precautions [SF]      ED Course User Index  [SF] Missael Guillaume,        Patient/Guardian requesting discharge. Patient/Guardian was given written and verbal instructions prior to discharge. Patient/Guardian understood and agreed. Patient/Guardian had no further questions.       RADIOLOGY:  XR CHEST PORTABLE   Final Result   No acute process.               EKG  See MDM for my interpretation     All EKG's are interpreted by the Emergency Department Physician who either signs or Co-signs this chart in the absence of a cardiologist.      PROCEDURES:  None    CONSULTS:  None    Critical Care

## 2024-07-03 NOTE — ED TRIAGE NOTES
Pt c/o of dizziness, cold sweat, SOB while doing pulmonary function test. Pt vitals at time of episode BP  90/45, 45HR. Pt is A&OX4, skin intact, afebrile, breaths are equal and unlabored. Pt states feeling better at this time.

## 2024-07-03 NOTE — DISCHARGE INSTRUCTIONS
Take your medication as indicated and prescribed.   Get up slowly; dangle your feet over the bed before standing up, do not stand up quickly.    If you have not had a stress test in over a year your primary care physician may order this test as further work-up for your lightheadedness.  If you have a cardiologist, then you should also call them to discuss further treatment options.    PLEASE RETURN TO THE EMERGENCY DEPARTMENT IMMEDIATELY for worsening symptoms of increasing pain, shortness of breath, feeling of your heart fluttering or racing, swelling to your feet, unable to lay flat, sensation of the room spinning, slurring of speech, loss of strength, or if you develop any concerning symptoms such as: high fever not relieved by acetaminophen (Tylenol) and/or ibuprofen (Motrin / Advil), chills, persistent nausea and/or vomiting, vomiting up blood, blood in your stool, loss of consciousness, numbness, weakness or tingling in the arms or legs or change in color of the extremities, changes in mental status, persistent headache, blurry vision, loss of bladder / bowel control, unable to follow up with your physician, or other any other care or concern.

## 2024-07-05 LAB
EKG ATRIAL RATE: 54 BPM
EKG P AXIS: 27 DEGREES
EKG P-R INTERVAL: 236 MS
EKG Q-T INTERVAL: 450 MS
EKG QRS DURATION: 94 MS
EKG QTC CALCULATION (BAZETT): 426 MS
EKG R AXIS: -2 DEGREES
EKG T AXIS: -3 DEGREES
EKG VENTRICULAR RATE: 54 BPM

## 2024-07-05 PROCEDURE — 93010 ELECTROCARDIOGRAM REPORT: CPT | Performed by: INTERNAL MEDICINE

## 2024-07-09 ENCOUNTER — APPOINTMENT (OUTPATIENT)
Dept: INFUSION THERAPY | Facility: CLINIC | Age: 74
End: 2024-07-09
Payer: MEDICARE

## 2024-07-09 VITALS
SYSTOLIC BLOOD PRESSURE: 109 MMHG | RESPIRATION RATE: 18 BRPM | OXYGEN SATURATION: 96 % | HEART RATE: 82 BPM | TEMPERATURE: 97.8 F | DIASTOLIC BLOOD PRESSURE: 55 MMHG

## 2024-07-09 DIAGNOSIS — Z78.9 STATIN INTOLERANCE: ICD-10-CM

## 2024-07-09 DIAGNOSIS — E78.2 MIXED HYPERLIPIDEMIA: ICD-10-CM

## 2024-07-09 PROCEDURE — 96372 THER/PROPH/DIAG INJ SC/IM: CPT | Performed by: NURSE PRACTITIONER

## 2024-07-09 RX ORDER — FAMOTIDINE 10 MG/ML
20 INJECTION INTRAVENOUS ONCE AS NEEDED
Status: DISCONTINUED | OUTPATIENT
Start: 2024-07-09 | End: 2024-07-09 | Stop reason: HOSPADM

## 2024-07-09 RX ORDER — DIPHENHYDRAMINE HYDROCHLORIDE 50 MG/ML
50 INJECTION INTRAMUSCULAR; INTRAVENOUS AS NEEDED
OUTPATIENT
Start: 2024-07-12

## 2024-07-09 RX ORDER — EPINEPHRINE 0.3 MG/.3ML
0.3 INJECTION SUBCUTANEOUS EVERY 5 MIN PRN
Status: DISCONTINUED | OUTPATIENT
Start: 2024-07-09 | End: 2024-07-09 | Stop reason: HOSPADM

## 2024-07-09 RX ORDER — EPINEPHRINE 0.3 MG/.3ML
0.3 INJECTION SUBCUTANEOUS EVERY 5 MIN PRN
OUTPATIENT
Start: 2024-07-12

## 2024-07-09 RX ORDER — ALBUTEROL SULFATE 0.83 MG/ML
3 SOLUTION RESPIRATORY (INHALATION) AS NEEDED
Status: DISCONTINUED | OUTPATIENT
Start: 2024-07-09 | End: 2024-07-09 | Stop reason: HOSPADM

## 2024-07-09 RX ORDER — DIPHENHYDRAMINE HYDROCHLORIDE 50 MG/ML
50 INJECTION INTRAMUSCULAR; INTRAVENOUS AS NEEDED
Status: DISCONTINUED | OUTPATIENT
Start: 2024-07-09 | End: 2024-07-09 | Stop reason: HOSPADM

## 2024-07-09 RX ORDER — FAMOTIDINE 10 MG/ML
20 INJECTION INTRAVENOUS ONCE AS NEEDED
OUTPATIENT
Start: 2024-07-12

## 2024-07-09 RX ORDER — ALBUTEROL SULFATE 0.83 MG/ML
3 SOLUTION RESPIRATORY (INHALATION) AS NEEDED
OUTPATIENT
Start: 2024-07-12

## 2024-07-09 ASSESSMENT — ENCOUNTER SYMPTOMS
GASTROINTESTINAL NEGATIVE: 1
CARDIOVASCULAR NEGATIVE: 1
RESPIRATORY NEGATIVE: 1

## 2024-07-09 NOTE — PROGRESS NOTES
[facility]   Infusion Clinic Note   Date: 2024   Name: Jose Alejandro Tello  : 1950   MRN: [id number]         Reason for Visit: Injections (LEQVIO INJECTION)         Today: We administered inclisiran.       Visit Type: INJECTION       Ordered By: HYACINTH SIMS      Accompanied by:Self      Diagnosis: Mixed hyperlipidemia    Statin intolerance       Allergies:   Allergies as of 2024 - Reviewed 2024   Allergen Reaction Noted    Ticagrelor Shortness of breath 10/12/2023    Ezetimibe Unknown 10/12/2023    Other Unknown 10/12/2023    Rosuvastatin Unknown 10/12/2023    Simvastatin Unknown 10/12/2023         Current Medications has a current medication list which includes the following prescription(s): amlodipine, aspirin, carvedilol, cyanocobalamin, leqvio, multivitamin, nitroglycerin, and synthroid, and the following Facility-Administered Medications: albuterol, dextrose, diphenhydramine, epinephrine, famotidine pf, methylprednisolone sod succinate, and sodium chloride.       Vitals:   Vitals:    24 1503   BP: 109/55   Pulse: 82   Resp: 18   Temp: 36.6 °C (97.8 °F)   SpO2: 96%             Infusion Pre-procedure Checklist:   - Allergies reviewed: yes   - Medications reviewed: yes       - Previous reaction to current treatment: no      Assess patient for the concerns below. Document provider notification as appropriate.  - Active or recent infection with/without current antibiotic use: no  - Recent or planned invasive dental work: no  - Recent or planned surgeries: no  - Recently received or plans to receive vaccinations: no  - Has treatment related toxicities: no  - Is pregnant:  no      Pain: 0   - Is the pain different from normal: n/a   - Is the pain tolerable: n/a   - Is your Doctor aware:  n/a      Labs: WNL         Fall Risk Screening: Green Fall Risk  Ambulatory Aid: Walks without aid/bedrest/nurse assist  Intravenous Therapy/Heparin Lock: No  Gait/Transferring:  Normal/bedrest/immobile  Mental Status: Oriented to own ability       Review Of Systems:  Review of Systems   Respiratory: Negative.     Cardiovascular: Negative.    Gastrointestinal: Negative.          Infusion Readiness:   - Assessment Concerns Related to Infusion: No  - Provider notified: no      Document Below Only If Indicated:               Weight Based Drug Calculations:    WEIGHT BASED DRUGS: NOT APPLICABLE / FLAT DOSE          Initiated By: Sandy Rodríguez RN

## 2024-07-09 NOTE — PATIENT INSTRUCTIONS
Today :We administered inclisiran.     For:   1. Mixed hyperlipidemia    2. Statin intolerance         Your next appointment is due in:  6mo        Please read the  Medication Guide that was given to you and reviewed during todays visit.     (Tell all doctors including dentists that you are taking this medication)     Go to the emergency room or call 911 if:  -You have signs of allergic reaction:   -Rash, hives, itching.   -Swollen, blistered, peeling skin.   -Swelling of face, lips, mouth, tongue or throat.   -Tightness of chest, trouble breathing, swallowing or talking     Call your doctor:  - If IV / injection site gets red, warm, swollen, itchy or leaks fluid or pus.     (Leave dressing on your IV site for at least 2 hours and keep area clean and dry  - If you get sick or have symptoms of infection or are not feeling well for any reason.    (Wash your hands often, stay away from people who are sick)  - If you have side effects from your medication that do not go away or are bothersome.     (Refer to the teaching your nurse gave you for side effects to call your doctor about)    - Common side effects may include:  stuffy nose, headache, feeling tired, muscle aches, upset stomach  - Before receiving any vaccines     - Call the [facility] at   If:  - You get sick, are on antibiotics, have had a recent vaccine, have surgery or dental work and your doctor wants your visit rescheduled.  - You need to cancel and reschedule your visit for any reason. Call at least 2 days before your visit if you need to cancel.   - Your insurance changes before your next visit.    (We will need to get approval from your new insurance. This can take up to two weeks.)     The [facility] is opened Monday thru Friday. We are closed on weekends and holidays.   Voice mail will take your call if the center is closed. If you leave a message please allow 24 hours for a call back during weekdays. If you leave a  message on a weekend/holiday, we will call you back the next business day.

## 2024-07-15 ENCOUNTER — OFFICE VISIT (OUTPATIENT)
Dept: FAMILY MEDICINE CLINIC | Age: 74
End: 2024-07-15
Payer: MEDICARE

## 2024-07-15 VITALS
HEART RATE: 67 BPM | SYSTOLIC BLOOD PRESSURE: 132 MMHG | TEMPERATURE: 97.4 F | BODY MASS INDEX: 26.79 KG/M2 | DIASTOLIC BLOOD PRESSURE: 64 MMHG | WEIGHT: 220 LBS | OXYGEN SATURATION: 98 % | HEIGHT: 76 IN

## 2024-07-15 DIAGNOSIS — R68.89 FLU-LIKE SYMPTOMS: ICD-10-CM

## 2024-07-15 DIAGNOSIS — U07.1 COVID-19: Primary | ICD-10-CM

## 2024-07-15 LAB
Lab: ABNORMAL
PERFORMING INSTRUMENT: ABNORMAL
QC PASS/FAIL: ABNORMAL
S PYO AG THROAT QL: NORMAL
SARS-COV-2, POC: DETECTED

## 2024-07-15 PROCEDURE — 87426 SARSCOV CORONAVIRUS AG IA: CPT | Performed by: NURSE PRACTITIONER

## 2024-07-15 PROCEDURE — 1123F ACP DISCUSS/DSCN MKR DOCD: CPT | Performed by: NURSE PRACTITIONER

## 2024-07-15 PROCEDURE — 3017F COLORECTAL CA SCREEN DOC REV: CPT | Performed by: NURSE PRACTITIONER

## 2024-07-15 PROCEDURE — 87880 STREP A ASSAY W/OPTIC: CPT | Performed by: NURSE PRACTITIONER

## 2024-07-15 PROCEDURE — 1036F TOBACCO NON-USER: CPT | Performed by: NURSE PRACTITIONER

## 2024-07-15 PROCEDURE — 3078F DIAST BP <80 MM HG: CPT | Performed by: NURSE PRACTITIONER

## 2024-07-15 PROCEDURE — 3075F SYST BP GE 130 - 139MM HG: CPT | Performed by: NURSE PRACTITIONER

## 2024-07-15 PROCEDURE — 99213 OFFICE O/P EST LOW 20 MIN: CPT | Performed by: NURSE PRACTITIONER

## 2024-07-15 PROCEDURE — G8427 DOCREV CUR MEDS BY ELIG CLIN: HCPCS | Performed by: NURSE PRACTITIONER

## 2024-07-15 PROCEDURE — G8417 CALC BMI ABV UP PARAM F/U: HCPCS | Performed by: NURSE PRACTITIONER

## 2024-07-15 RX ORDER — FLUTICASONE PROPIONATE 50 MCG
1 SPRAY, SUSPENSION (ML) NASAL DAILY
Qty: 1 EACH | Refills: 0 | Status: SHIPPED | OUTPATIENT
Start: 2024-07-15

## 2024-07-15 RX ORDER — GUAIFENESIN 600 MG/1
600 TABLET, EXTENDED RELEASE ORAL 2 TIMES DAILY
Qty: 30 TABLET | Refills: 0 | Status: SHIPPED | OUTPATIENT
Start: 2024-07-15 | End: 2024-07-30

## 2024-07-15 ASSESSMENT — ENCOUNTER SYMPTOMS
WHEEZING: 0
COUGH: 1
EYE PAIN: 0
SINUS PAIN: 0
SHORTNESS OF BREATH: 0
EYE REDNESS: 0
RHINORRHEA: 0
SORE THROAT: 1
EYE DISCHARGE: 0
SINUS PRESSURE: 0
EYE ITCHING: 0

## 2024-07-15 NOTE — PROGRESS NOTES
Subjective  Isidro Kaye, 73 y.o. male presents today with:  Chief Complaint   Patient presents with    Pharyngitis     Pt states sore throat and body aches started yesterday.          HPI  Patient here with wife who has similar symptoms  States symptoms started yesterday   Started with a cough  He does have post nasal drip drainage   Causing a hard cough-white not too thick  Not using anything for symptoms     Review of Systems   Constitutional:  Negative for appetite change, chills, fatigue and fever.   HENT:  Positive for postnasal drip and sore throat. Negative for congestion, ear pain, rhinorrhea, sinus pressure and sinus pain.    Eyes:  Negative for pain, discharge, redness and itching.   Respiratory:  Positive for cough. Negative for shortness of breath and wheezing.    Cardiovascular:  Negative for chest pain and palpitations.   Musculoskeletal:  Positive for myalgias.   Skin:  Negative for rash.   Neurological:  Negative for dizziness, weakness and headaches.       Past Medical History:   Diagnosis Date    CAD (coronary artery disease)     Congenital heart disease     Coronary artery disease     Heart attack (HCC) 10/30/2017    Hyperlipidemia     Hypertension     Hypothyroidism     Knee pain     MI (myocardial infarction) ([facility])      Past Surgical History:   Procedure Laterality Date    CARDIAC CATHETERIZATION  14    DR HOLT    CORONARY ANGIOPLASTY WITH STENT PLACEMENT  2008    VASECTOMY       Social History     Socioeconomic History    Marital status:      Spouse name: Not on file    Number of children: Not on file    Years of education: Not on file    Highest education level: Not on file   Occupational History    Not on file   Tobacco Use    Smoking status: Former     Current packs/day: 0.00     Average packs/day: 1.5 packs/day for 20.0 years (30.0 ttl pk-yrs)     Types: Cigarettes     Start date:      Quit date:      Years since quittin.5    Smokeless tobacco: Never

## 2024-07-15 NOTE — PATIENT INSTRUCTIONS
ER for worsening symptoms like issues breathing chest pain   Stay hydrated, rest,  Tylenol for body aches     Symptoms worsen or do not improve return or see PCP

## 2024-07-17 LAB — BACTERIA THROAT AEROBE CULT: NORMAL

## 2024-08-05 ENCOUNTER — OFFICE VISIT (OUTPATIENT)
Dept: FAMILY MEDICINE CLINIC | Age: 74
End: 2024-08-05
Payer: MEDICARE

## 2024-08-05 VITALS
DIASTOLIC BLOOD PRESSURE: 60 MMHG | TEMPERATURE: 97.1 F | OXYGEN SATURATION: 96 % | RESPIRATION RATE: 13 BRPM | HEART RATE: 55 BPM | BODY MASS INDEX: 26.79 KG/M2 | HEIGHT: 76 IN | WEIGHT: 220 LBS | SYSTOLIC BLOOD PRESSURE: 114 MMHG

## 2024-08-05 DIAGNOSIS — R05.1 ACUTE COUGH: ICD-10-CM

## 2024-08-05 DIAGNOSIS — R73.09 ELEVATED GLUCOSE: ICD-10-CM

## 2024-08-05 DIAGNOSIS — R42 LIGHTHEADEDNESS: Primary | ICD-10-CM

## 2024-08-05 PROCEDURE — G8427 DOCREV CUR MEDS BY ELIG CLIN: HCPCS | Performed by: NURSE PRACTITIONER

## 2024-08-05 PROCEDURE — 93000 ELECTROCARDIOGRAM COMPLETE: CPT | Performed by: NURSE PRACTITIONER

## 2024-08-05 PROCEDURE — 99213 OFFICE O/P EST LOW 20 MIN: CPT | Performed by: NURSE PRACTITIONER

## 2024-08-05 PROCEDURE — G8417 CALC BMI ABV UP PARAM F/U: HCPCS | Performed by: NURSE PRACTITIONER

## 2024-08-05 PROCEDURE — 1036F TOBACCO NON-USER: CPT | Performed by: NURSE PRACTITIONER

## 2024-08-05 PROCEDURE — 3017F COLORECTAL CA SCREEN DOC REV: CPT | Performed by: NURSE PRACTITIONER

## 2024-08-05 PROCEDURE — 3078F DIAST BP <80 MM HG: CPT | Performed by: NURSE PRACTITIONER

## 2024-08-05 PROCEDURE — 1123F ACP DISCUSS/DSCN MKR DOCD: CPT | Performed by: NURSE PRACTITIONER

## 2024-08-05 PROCEDURE — 3074F SYST BP LT 130 MM HG: CPT | Performed by: NURSE PRACTITIONER

## 2024-08-05 RX ORDER — BENZONATATE 200 MG/1
200 CAPSULE ORAL 3 TIMES DAILY PRN
Qty: 15 CAPSULE | Refills: 0 | Status: SHIPPED | OUTPATIENT
Start: 2024-08-05 | End: 2024-08-10

## 2024-08-05 ASSESSMENT — ENCOUNTER SYMPTOMS
WHEEZING: 0
ABDOMINAL PAIN: 0
VOMITING: 0
DIARRHEA: 0
CHEST TIGHTNESS: 0
SHORTNESS OF BREATH: 1
STRIDOR: 0
COUGH: 1
NAUSEA: 0

## 2024-08-05 NOTE — PROGRESS NOTES
Left Ear: Hearing, tympanic membrane, ear canal and external ear normal.      Nose: Nose normal.      Mouth/Throat:      Lips: Pink.      Mouth: Mucous membranes are moist.      Pharynx: Oropharynx is clear.   Eyes:      General: Lids are normal.      Extraocular Movements: Extraocular movements intact.      Conjunctiva/sclera: Conjunctivae normal.      Pupils: Pupils are equal, round, and reactive to light.   Neck:      Trachea: Trachea normal.   Cardiovascular:      Rate and Rhythm: Normal rate and regular rhythm.      Pulses: Normal pulses.      Heart sounds: Normal heart sounds, S1 normal and S2 normal.   Pulmonary:      Effort: Pulmonary effort is normal.      Breath sounds: Normal breath sounds and air entry.   Musculoskeletal:         General: Normal range of motion.      Cervical back: Normal range of motion and neck supple. No tenderness.   Lymphadenopathy:      Cervical: No cervical adenopathy.   Skin:     General: Skin is warm and dry.      Capillary Refill: Capillary refill takes less than 2 seconds.   Neurological:      General: No focal deficit present.      Mental Status: He is alert and oriented to person, place, and time. Mental status is at baseline.   Psychiatric:         Attention and Perception: Attention and perception normal.         Mood and Affect: Mood normal.         Speech: Speech normal.         Behavior: Behavior normal. Behavior is cooperative.         Thought Content: Thought content normal.         Cognition and Memory: Cognition and memory normal.         Judgment: Judgment normal.         Assessment:       Diagnosis Orders   1. Lightheadedness  CBC with Auto Differential    Comprehensive Metabolic Panel    Hemoglobin A1C    EKG 12 Lead - Clinic Performed    XR CHEST (2 VW)    EKG 12 Lead - Clinic Performed      2. Elevated glucose  Hemoglobin A1C      3. Acute cough  benzonatate (TESSALON) 200 MG capsule        No results found for this visit on 08/05/24.   Plan:     Assessment &

## 2024-08-06 DIAGNOSIS — R73.09 ELEVATED GLUCOSE: ICD-10-CM

## 2024-08-06 DIAGNOSIS — R42 LIGHTHEADEDNESS: ICD-10-CM

## 2024-08-06 LAB
ALBUMIN SERPL-MCNC: 3.7 G/DL (ref 3.5–4.6)
ALP SERPL-CCNC: 98 U/L (ref 35–104)
ALT SERPL-CCNC: 20 U/L (ref 0–41)
ANION GAP SERPL CALCULATED.3IONS-SCNC: 8 MEQ/L (ref 9–15)
AST SERPL-CCNC: 15 U/L (ref 0–40)
BASOPHILS # BLD: 0 K/UL (ref 0–0.2)
BASOPHILS NFR BLD: 0.5 %
BILIRUB SERPL-MCNC: 0.3 MG/DL (ref 0.2–0.7)
BUN SERPL-MCNC: 16 MG/DL (ref 8–23)
CALCIUM SERPL-MCNC: 9 MG/DL (ref 8.5–9.9)
CHLORIDE SERPL-SCNC: 99 MEQ/L (ref 95–107)
CO2 SERPL-SCNC: 25 MEQ/L (ref 20–31)
CREAT SERPL-MCNC: 1.14 MG/DL (ref 0.7–1.2)
EOSINOPHIL # BLD: 0.5 K/UL (ref 0–0.7)
EOSINOPHIL NFR BLD: 7.5 %
ERYTHROCYTE [DISTWIDTH] IN BLOOD BY AUTOMATED COUNT: 13 % (ref 11.5–14.5)
GLOBULIN SER CALC-MCNC: 3.1 G/DL (ref 2.3–3.5)
GLUCOSE SERPL-MCNC: 93 MG/DL (ref 70–99)
HCT VFR BLD AUTO: 34.5 % (ref 42–52)
HGB BLD-MCNC: 11.2 G/DL (ref 14–18)
LYMPHOCYTES # BLD: 1 K/UL (ref 1–4.8)
LYMPHOCYTES NFR BLD: 15.1 %
MCH RBC QN AUTO: 30.1 PG (ref 27–31.3)
MCHC RBC AUTO-ENTMCNC: 32.5 % (ref 33–37)
MCV RBC AUTO: 92.7 FL (ref 79–92.2)
MONOCYTES # BLD: 1 K/UL (ref 0.2–0.8)
MONOCYTES NFR BLD: 14.8 %
NEUTROPHILS # BLD: 4 K/UL (ref 1.4–6.5)
NEUTS SEG NFR BLD: 61.8 %
PLATELET # BLD AUTO: 308 K/UL (ref 130–400)
POTASSIUM SERPL-SCNC: 4.8 MEQ/L (ref 3.4–4.9)
PROT SERPL-MCNC: 6.8 G/DL (ref 6.3–8)
RBC # BLD AUTO: 3.72 M/UL (ref 4.7–6.1)
SODIUM SERPL-SCNC: 132 MEQ/L (ref 135–144)
WBC # BLD AUTO: 6.4 K/UL (ref 4.8–10.8)

## 2024-08-06 NOTE — RESULT ENCOUNTER NOTE
Sodium is slightly low. This could be reason for dizziness. Need to increase sodium in diet and have this rechecked in a week

## 2024-08-07 LAB
ESTIMATED AVERAGE GLUCOSE: 114 MG/DL
HBA1C MFR BLD: 5.6 % (ref 4–6)

## 2024-08-07 RX ORDER — DOXYCYCLINE HYCLATE 100 MG
100 TABLET ORAL 2 TIMES DAILY
Qty: 20 TABLET | Refills: 0 | Status: SHIPPED | OUTPATIENT
Start: 2024-08-07 | End: 2024-08-17

## 2024-08-14 ENCOUNTER — HOSPITAL ENCOUNTER (OUTPATIENT)
Dept: PULMONOLOGY | Age: 74
Discharge: HOME | End: 2024-08-14
Attending: INTERNAL MEDICINE
Payer: MEDICARE

## 2024-08-14 ENCOUNTER — OFFICE VISIT (OUTPATIENT)
Dept: PULMONOLOGY | Age: 74
End: 2024-08-14
Payer: MEDICARE

## 2024-08-14 VITALS — HEIGHT: 76 IN | WEIGHT: 220 LBS | BODY MASS INDEX: 26.79 KG/M2

## 2024-08-14 VITALS
DIASTOLIC BLOOD PRESSURE: 62 MMHG | HEART RATE: 64 BPM | SYSTOLIC BLOOD PRESSURE: 138 MMHG | RESPIRATION RATE: 18 BRPM | WEIGHT: 217 LBS | BODY MASS INDEX: 26.42 KG/M2 | HEIGHT: 76 IN | OXYGEN SATURATION: 98 % | TEMPERATURE: 97.5 F

## 2024-08-14 DIAGNOSIS — G47.30 SLEEP APNEA, UNSPECIFIED TYPE: ICD-10-CM

## 2024-08-14 DIAGNOSIS — R05.3 CHRONIC COUGH: ICD-10-CM

## 2024-08-14 DIAGNOSIS — R06.09 DOE (DYSPNEA ON EXERTION): ICD-10-CM

## 2024-08-14 DIAGNOSIS — J43.2 CENTRILOBULAR EMPHYSEMA (HCC): ICD-10-CM

## 2024-08-14 DIAGNOSIS — J84.10 PULMONARY FIBROSIS (HCC): Primary | ICD-10-CM

## 2024-08-14 DIAGNOSIS — M62.81 MUSCLE WEAKNESS: ICD-10-CM

## 2024-08-14 DIAGNOSIS — J44.9 CHRONIC OBSTRUCTIVE PULMONARY DISEASE, UNSPECIFIED COPD TYPE (HCC): ICD-10-CM

## 2024-08-14 PROCEDURE — 3023F SPIROM DOC REV: CPT | Performed by: INTERNAL MEDICINE

## 2024-08-14 PROCEDURE — 3075F SYST BP GE 130 - 139MM HG: CPT | Performed by: INTERNAL MEDICINE

## 2024-08-14 PROCEDURE — G8427 DOCREV CUR MEDS BY ELIG CLIN: HCPCS | Performed by: INTERNAL MEDICINE

## 2024-08-14 PROCEDURE — 3078F DIAST BP <80 MM HG: CPT | Performed by: INTERNAL MEDICINE

## 2024-08-14 PROCEDURE — 1036F TOBACCO NON-USER: CPT | Performed by: INTERNAL MEDICINE

## 2024-08-14 PROCEDURE — 94618 PULMONARY STRESS TESTING: CPT

## 2024-08-14 PROCEDURE — 1123F ACP DISCUSS/DSCN MKR DOCD: CPT | Performed by: INTERNAL MEDICINE

## 2024-08-14 PROCEDURE — 99214 OFFICE O/P EST MOD 30 MIN: CPT | Performed by: INTERNAL MEDICINE

## 2024-08-14 PROCEDURE — G8417 CALC BMI ABV UP PARAM F/U: HCPCS | Performed by: INTERNAL MEDICINE

## 2024-08-14 RX ORDER — BUDESONIDE AND FORMOTEROL FUMARATE DIHYDRATE 80; 4.5 UG/1; UG/1
2 AEROSOL RESPIRATORY (INHALATION) 2 TIMES DAILY
Qty: 10.2 G | Refills: 3 | Status: SHIPPED | OUTPATIENT
Start: 2024-08-14

## 2024-08-14 RX ORDER — BENZONATATE 100 MG/1
100-200 CAPSULE ORAL 3 TIMES DAILY PRN
Qty: 60 CAPSULE | Refills: 0 | Status: SHIPPED | OUTPATIENT
Start: 2024-08-14 | End: 2024-09-03

## 2024-08-14 ASSESSMENT — 6 MINUTE WALK TEST (6MWT)
BLOOD PRESSURE: 143/60
DID PATIENT STOP OR PAUSE BEFORE 6 MINUTES?: NO
% PREDICTED: 62.01
OXYGEN DEVICE: ROOM AIR
HEART RATE: 58
O2 SATURATION: 97
BORG DYSPNEA SCALE SCORE: NOTHING AT ALL
HEART RATE: 132
BLOOD PRESSURE 1: 133/63
HEART RATE: 63
TOTAL DISTANCE WALKED (M): 372.67
O2 SATURATION: 97
O2 SATURATION: 94
BORG FATIGUE SCALE SCORE: MODERATE
BORG DYSPNEA SCALE SCORE: VERY SLIGHT

## 2024-08-14 NOTE — PROGRESS NOTES
08/14/24 1004   Data Measured Before Walk   Height 1.93 m (6' 4\")   Weight - Scale 99.8 kg (220 lb)   HR 58   Blood Pressure 133/63   O2 Saturation 97   O2 Device Room air   Zack Dyspnea Scale 0   Zack Fatigue Scale 3   Data Measured During the Walk   Heart Rate 132   O2 Saturation 94   Data Measured Immediately After Walk   Did Patient Stop or Pause Before 6 Minutes No   Predicted Distance (m) 601 Meters   Total Distance Walked (m) 372.67 Meters   % Predicted 62.01   Heart Rate 63   Blood Pressure 143/60   O2 Saturation 97   Zack Dyspnea Scale 1   Zack Fatigue Scale 0   Data Measured 5 Minutes After Walk   Comments Pt states he feel better after walk. Pt had covid in July and is still coughing.

## 2024-08-14 NOTE — PROGRESS NOTES
Subjective:     Isidro Kaye is a 74 y.o. male who complains today of:     Chief Complaint   Patient presents with    Follow-up     2 Month F/U for Dyspnea on exertion/POST COVID-19    Results     CT Chest and PFT       HPI  Patient presents for shortness of breath    8/14/2024  Presents for follow-up, he had COVID-19 infection recently and since then he has been having dry cough feels like throat clearing, no chest pain, he has mild dyspnea on exertion, he reported weakness at the beginning of his activity, mainly involve proximal muscles then he gets better the more activity he does.  No chest pain, no fever, weight is stable, no lower extremity edema, he reports snoring and frequent urination and awaking at night.  He was unable to finish spirometry, he got dizzy, and lightheaded he was sent to emergency room.  He did 6-minute walk test today    6/6/2024  Patient complains of dyspnea on exertion, he report his symptoms as legs weakness and tightness, mainly with exertion, he is able to bike for almost 6 miles but initially at the beginning of the activity he feels his legs are tight and he has to stop.  He has no chest pain, no coughing, no fever no chills, no wheezing, his weight is stable, no skin rash.  He was evaluated by neurology Dr. Hahn and rheumatology in [place] and workup has been negative I do not have access to the results.  He was previously seen by me and had a PFT which was normal.  He has no symptoms suggestive of sleep apnea.             Allergies:  Ticagrelor, Ezetimibe, Other, Statins, and Propofol  Past Medical History:   Diagnosis Date    CAD (coronary artery disease)     Congenital heart disease     Coronary artery disease     Heart attack (HCC) 10/30/2017    Hyperlipidemia     Hypertension     Hypothyroidism     Knee pain     MI (myocardial infarction) ([facility]) 2008     Past Surgical History:   Procedure Laterality Date    CARDIAC CATHETERIZATION  05/13/16    DR YANET STRICKLAND

## 2024-08-18 NOTE — TELEPHONE ENCOUNTER
If the steroid helped we need to find a rheumatologist for definitive treatment and diagnosis  
[facility]    
Patient calling in says when he was seen on 6/19 he was given a RX to help with his joint pain. The prednisone pill worked for the 6 days but as soon as he ran out it started back up again, patient requesting a 2nd option    Please call patient back if sending a RX into pharmacy- [phone]    Patient also scheduled a VV 7/1/24 with  at 8:30a.m      
Dizziness

## 2024-08-29 ENCOUNTER — EVALUATION (OUTPATIENT)
Dept: PHYSICAL THERAPY | Facility: CLINIC | Age: 74
End: 2024-08-29
Payer: MEDICARE

## 2024-08-29 DIAGNOSIS — M25.551 BILATERAL HIP PAIN: Primary | ICD-10-CM

## 2024-08-29 DIAGNOSIS — M25.552 BILATERAL HIP PAIN: Primary | ICD-10-CM

## 2024-08-29 DIAGNOSIS — M25.659 HIP STIFFNESS, UNSPECIFIED LATERALITY: ICD-10-CM

## 2024-08-29 DIAGNOSIS — M62.89 MUSCLE TIGHTNESS: ICD-10-CM

## 2024-08-29 PROCEDURE — 97140 MANUAL THERAPY 1/> REGIONS: CPT | Mod: GP | Performed by: PHYSICAL THERAPIST

## 2024-08-29 PROCEDURE — 97110 THERAPEUTIC EXERCISES: CPT | Mod: GP | Performed by: PHYSICAL THERAPIST

## 2024-08-29 PROCEDURE — 97161 PT EVAL LOW COMPLEX 20 MIN: CPT | Mod: GP | Performed by: PHYSICAL THERAPIST

## 2024-08-29 NOTE — PROGRESS NOTES
Physical Therapy    Physical Therapy Evaluation and Treatment      Patient Name: Jose Alejandro Tello  MRN: [id number]  Today's Date: 9/3/2024  Insurance:  [facility] Medicare  0% coins, no ded, $1500 oop ($160 met), no copay, [facility]mn [place] yr, AUTH REQ AFTER 9/1/24  Visit 1 of 20 (re-assess at 10)  Time Entry:   Time Calculation  Start Time: 1138  Stop Time: 1225  Time Calculation (min): 47 min  PT Evaluation Time Entry  PT Evaluation (Low) Time Entry: 20  PT Therapeutic Procedures Time Entry  Manual Therapy Time Entry: 8  Therapeutic Exercise Time Entry: 15                   Assessment:    Pt is a 75 yo male presenting with B hip pain and decreased mobility that is making it difficult to sustain WB activity. He made some notable progress in tissue extensibility with manual work he had this dates. Skilled PT is therefore recommended to cont with the manual work and progressive strengthening of LEs so that he can better manage daily activities such as amb on level distance and stairs, as well as transfers.     Plan:  OP PT Plan  Treatment/Interventions: Aquatic therapy, Dry needling, Education/ Instruction, Electrical stimulation, Gait training, Manual therapy, Mechanical traction, Neuromuscular re-education, Self care/ home management, Therapeutic activities, Therapeutic exercises  PT Plan: Skilled PT  PT Frequency:  (1-2x/week)  Certification Period Start Date: 08/29/24  Certification Period End Date: 11/27/24  Rehab Potential: Good  Plan of Care Agreement: Patient    Current Problem:   1. Bilateral hip pain  Follow Up In Physical Therapy      2. Hip stiffness, unspecified laterality  Referral to Physical Therapy      3. Muscle tightness            Subjective    General:   Pt notes that it is suspected that he has PMR. He had a flare up of symptoms 5 yrs ago, was treated with Prednisone, and this more or less resolved. He only residual function issue was having difficulty and fatigue with ascending stairs.   He had a flare recently  that has not seemed to respond as well to the Prednisone that he was given.   Precautions:     Vital Signs:     Pain:   Current: 0  Worst: 10  Location: B glutes, proximal post thighs.    Home Living:     Current Level of Function:   walking jorge l= some fatigue/tightness after 10 min   stairs: ascends and descends recip, but gets fatigued with ascending, and has to sit after reaching the top of the steps.  Sitting jorge l= unlimited.   supine to sit transfers:  sit to stand transfers:      Objective   AROM supine knee flexion R/L:  R= 84* before MTT, 110* after MTT  L= 76* before MTT, 97* after MTT     AROM Hip R/L:  IR: 11*/7*  ER: 35*/26*    Strength R/L:  Hip flex: 24#/29#  Hip ext: 32#/35#  Hip abduct: 31#/39#  Hip adduct: 31#/29#  Hip IR: 22#/21#  Hip ER: 28#/32#  Quads: 56#/60#  Hams: 41#/49#    90-90 hamstring flexibility R/L: 45*/50*    FADIR R/L: (+) for tightness bilaterally.  ELIAZAR R/L: (-) bilaterally, but a little tighter on the L      Outcome Measures:  Other Measures  Lower Extremity Funtional Score (LEFS): 37/80     Treatments:  Ther Ex 90888: 15/1  LTR :05 5x  BKFO :05 5x  SKTC :30 2x each  KTOS :30 2x each  TA, Kegel :05 10x     Manual Ther Tech [id number]: 8/1  TPR to B TFL, R iliacus    EDUCATION:   Pt given HEP of exercises performed today.    Goals:  Goals to be achieved by discharge, approx 10-12 weeks.    Patient will verbalize pain (0-10) = 0/10 at rest and 2/10 at worst with activity.    Patient will correctly perform home exercise program to progress current functional status.     LEFS score will be >  50/80 to demonstrate overall improved functional abilities.     Increase hamstring 90-90 flexibility to 65* to decrease strain in the hips and low back.    Increase knee ROM to 0-120*    Increase LE strength: hip flex#= 30#, hip ext= 35#, abduct= 35#, quads= 60#, hams= 50#    Increase walking jorge l to 30 min to improve community amb.

## 2024-09-06 ENCOUNTER — TREATMENT (OUTPATIENT)
Dept: PHYSICAL THERAPY | Facility: CLINIC | Age: 74
End: 2024-09-06
Payer: MEDICARE

## 2024-09-06 DIAGNOSIS — M25.552 BILATERAL HIP PAIN: ICD-10-CM

## 2024-09-06 DIAGNOSIS — M25.551 BILATERAL HIP PAIN: ICD-10-CM

## 2024-09-06 DIAGNOSIS — M62.89 MUSCLE TIGHTNESS: ICD-10-CM

## 2024-09-06 DIAGNOSIS — M25.659 HIP STIFFNESS, UNSPECIFIED LATERALITY: Primary | ICD-10-CM

## 2024-09-06 PROCEDURE — 97110 THERAPEUTIC EXERCISES: CPT | Mod: GP,CQ

## 2024-09-06 PROCEDURE — 97140 MANUAL THERAPY 1/> REGIONS: CPT | Mod: GP,CQ

## 2024-09-06 NOTE — PROGRESS NOTES
"Patient Name: Jose Alejandro Tello  MRN: [id number]  Today's Date: 9/6/2024  Time Calculation  Start Time: 0945  Stop Time: 1030  Time Calculation (min): 45 min  [place] Medicare  0% coins, no ded, $1500 oop ($160 met), no copay, bmn [place] yr, AUTH REQ AFTER 9/1/24  Visit 2 of 20 (re-assess at 10)    Subjective:  States that some days are worse than others in relation to hip stiffness and pain. No pain currently. Reports that stiffness lessens about 2 in the afternoon.    Objective:  + R hip flexor tightness     Assessment:   Decreased  B hip flexibility noted however increased stiffness with R LE .  Reviewed current HEP demos good understanding on proper form.      Plan:   Cont with increasing LE flexibility     Treatment :   Ther Ex 20392: 30 Minutes 2 units    Nustep level 3 5 minutes  seat # 16 arms 15 #   LTR :05 5x  BKFO :05 5x  SKTC :30 2x each  KTOS :30 2x each  TA, Kegel :05 10x    Adductor set with ball  with TA /kegal 5 seconds x10   SKTC with opposite extended or hip flexor stretching 1x30 seconds   Manual Ther Tech 47122: 15/1  TPR to B TFL, B hip flexors R iliacus              Current Problem:  1. Hip stiffness, unspecified laterality        2. Bilateral hip pain        3. Muscle tightness                Pain:  \"Sore and stiff\"        Precautions: No falls      "

## 2024-09-12 ENCOUNTER — HOSPITAL ENCOUNTER (OUTPATIENT)
Dept: SLEEP CENTER | Age: 74
Discharge: HOME | End: 2024-09-14
Payer: MEDICARE

## 2024-09-12 PROCEDURE — 95806 SLEEP STUDY UNATT&RESP EFFT: CPT

## 2024-09-13 ENCOUNTER — TREATMENT (OUTPATIENT)
Dept: PHYSICAL THERAPY | Facility: CLINIC | Age: 74
End: 2024-09-13
Payer: MEDICARE

## 2024-09-13 DIAGNOSIS — M62.89 MUSCLE TIGHTNESS: Primary | ICD-10-CM

## 2024-09-13 DIAGNOSIS — M25.552 BILATERAL HIP PAIN: ICD-10-CM

## 2024-09-13 DIAGNOSIS — M25.551 BILATERAL HIP PAIN: ICD-10-CM

## 2024-09-13 PROCEDURE — 97110 THERAPEUTIC EXERCISES: CPT | Mod: GP | Performed by: PHYSICAL THERAPIST

## 2024-09-13 ASSESSMENT — PAIN SCALES - GENERAL: PAINLEVEL_OUTOF10: 3

## 2024-09-13 ASSESSMENT — PAIN - FUNCTIONAL ASSESSMENT: PAIN_FUNCTIONAL_ASSESSMENT: 0-10

## 2024-09-13 NOTE — PROGRESS NOTES
Patient Name: Jose Alejandro Tello  MRN: [id number]  Today's Date: 9/13/2024  Time Calculation  Start Time: 0955  Stop Time: 1039  Time Calculation (min): 44 min  [facility] Medicare  0% coins, no ded, $1500 oop ($160 met), no copay, bmn [place] yr, AUTH REQ AFTER 9/1/24  Visit 3 of 20 (re-assess at 10)    Subjective:  Pt notes that he cont to have exhaustion after climbing 12 steps, but reports that the exhaustion and recovery seems the same with 12 steps as it does with 20 or 30.     Objective:      Assessment:   L hip was not able to abduct the L hip in S/L as much as the R, and veered more into hip flexion as well, showing overriding TFL. He was quickly fatigued in the lateral hip. Add'l effort with eccentric strengthening is recommended for flexible strength, such as yoga or mandy chi.    Plan:   Cont with increasing LE flexibility. Consider yoga/ mandy chi exercises.    Treatment :   Ther Ex 30734: 40/3  Nustep level 3 5 minutes  seat # 15 arms 14 #   Ham, hip flexor stretch at step, :30 1x each  Adductor stretch with wide MICHELE, :30 1x   LTR :05 5x  BKFO :05 5x  SKTC :30 2x each  KTOS :30 2x each  SLR flex, abduct, 10x each  Modified pigeon pose (fig 4 stretch) on EOB, :30 1x  Hip flexor stretch off EOB, :30 1x    Manual Ther Tech [id number]: ---     Not performed today:  TA, Kegel :05 10x   Adductor set with ball  with TA /kegel 5 seconds x10   SKTC with opposite extended or hip flexor stretching 1x30 seconds     Current Problem:  1. Muscle tightness        2. Bilateral hip pain  Follow Up In Physical Therapy              Pain:         Precautions: No falls

## 2024-09-16 ENCOUNTER — TREATMENT (OUTPATIENT)
Dept: PHYSICAL THERAPY | Facility: CLINIC | Age: 74
End: 2024-09-16
Payer: MEDICARE

## 2024-09-16 DIAGNOSIS — M25.551 BILATERAL HIP PAIN: ICD-10-CM

## 2024-09-16 DIAGNOSIS — M62.89 MUSCLE TIGHTNESS: Primary | ICD-10-CM

## 2024-09-16 DIAGNOSIS — M25.552 BILATERAL HIP PAIN: ICD-10-CM

## 2024-09-16 PROCEDURE — 97110 THERAPEUTIC EXERCISES: CPT | Mod: GP | Performed by: PHYSICAL THERAPIST

## 2024-09-16 PROCEDURE — 97112 NEUROMUSCULAR REEDUCATION: CPT | Mod: GP | Performed by: PHYSICAL THERAPIST

## 2024-09-16 ASSESSMENT — PAIN SCALES - GENERAL: PAINLEVEL_OUTOF10: 8

## 2024-09-16 ASSESSMENT — PAIN - FUNCTIONAL ASSESSMENT: PAIN_FUNCTIONAL_ASSESSMENT: 0-10

## 2024-09-16 NOTE — PROGRESS NOTES
Patient Name: Jose Alejandro Tello  MRN: [id number]  Today's Date: 9/24/2024  Time Calculation  Start Time: 1018  Stop Time: 1056  Time Calculation (min): 38 min  [facility] Medicare  0% coins, no ded, $1500 oop ($160 met), no copay, bmn [place] yr, AUTH REQ AFTER 9/1/24  Visit 4 of 20 (re-assess at 10)    Subjective:  Pt reports today is a bad day, the hips feel very inflamed. He states that he took an Advil to help with breakthrough pain, despite knowing that it was not recommended to take it while he is on Prednisone.    Objective:    Assessment:   Pt became fatigued through the shoulders with fig 8 exercise. Fatigued again with SLR abduct rainbow, resulting in compensatory mvmts.    Plan:   Cont with increasing LE flexibility. Consider yoga/ mandy chi exercises.    Treatment :   Ther Ex 96204: 28/2  Nustep (seat 15, UEs 14) L2 5 min    Ham, hip flexor stretch at step, :30 1x each  Psoas stretch : lunge with trunk rotation to opp side, :30 1x  Adductor stretch with side toward step, :30 1x   Modified pigeon pose (fig 4 stretch) on EOB, :30 1x  SLR abduct rainbow, 10x  LTR with big green SB, :05 5x  BKFO :05 5x  KTOS :30 2x each    Neuromusc Re-edu 54080: 10/1  Static lat lunge with horiz fig 8 while holding small ball. 3x each  Static fwd lunge with vert fig 8 while holding small ball, 3x each  Bridges on SB, bent leg and straight leg, 10x each    Manual Ther Tech [id number]: ---     Not performed today:  SKTC :30 2x each  Hip flexor stretch off EOB, :30 1x  TA, Kegel :05 10x   Adductor set with ball  with TA /kegel 5 seconds x10   SKTC with opposite extended or hip flexor stretching 1x30 seconds     Current Problem:  1. Muscle tightness        2. Bilateral hip pain  Follow Up In Physical Therapy           Pain:    Precautions: No falls

## 2024-09-20 ENCOUNTER — APPOINTMENT (OUTPATIENT)
Dept: PHYSICAL THERAPY | Facility: CLINIC | Age: 74
End: 2024-09-20
Payer: MEDICARE

## 2024-09-24 ENCOUNTER — TREATMENT (OUTPATIENT)
Dept: PHYSICAL THERAPY | Facility: CLINIC | Age: 74
End: 2024-09-24
Payer: MEDICARE

## 2024-09-24 DIAGNOSIS — M25.552 BILATERAL HIP PAIN: ICD-10-CM

## 2024-09-24 DIAGNOSIS — M25.551 BILATERAL HIP PAIN: ICD-10-CM

## 2024-09-24 PROCEDURE — 97110 THERAPEUTIC EXERCISES: CPT | Mod: GP,CQ

## 2024-09-24 NOTE — PROGRESS NOTES
Patient Name: Jose Alejandro Tello  MRN: [id number]  Today's Date: 9/24/2024  Time Calculation  Start Time: 0250  Stop Time: 0330  Time Calculation (min): 40 min  [facility] Medicare  0% coins, no ded, $1500 oop ($160 met), no copay, bmn [place] yr, AUTH REQ AFTER 9/1/24  Visit 5 of 20 (re-assess at 10)    Subjective:  Just stiff. States that he had increased stiffness and pain in B hips/LB approximately 2 days .     Objective:  Hip abduction strength R/L  4-/3+     Assessment:   Pt challenged with maintaining  proper hip position with SL hip strengthening exercises due to noticeable tightness and weakness. L hip more restricted that R .     Plan:   Cont with increasing core and hip strength     Treatment :   Ther Ex 06140: 40 minutes /3 units   Nustep (seat 15, UEs 14) L2 5 min    Ham, hip flexor stretch at step, :30 1x each  Psoas stretch : lunge with trunk rotation to opp side, :30 1x  Adductor stretch with side toward step, :30 1x   Modified pigeon pose (fig 4 stretch) on EOB, :30 1x  SL hip abduction 2x10   SLR abduct rainbow, 10x  LTR with big green SB, :05 5x  BKFO :05 5x  KTOS :30 2x each   Side stepping RTB 4 laps along rail   Side stepping without resistance 4 laps   Paloff press with Cable column 12.5 # 2x15     Neuromusc Re-edu 72739: NV   Static lat lunge with horiz fig 8 while holding small ball. 3x each  Static fwd lunge with vert fig 8 while holding small ball, 3x each  Bridges on SB, bent leg and straight leg, 10x each           Current Problem:  1. Bilateral hip pain  Follow Up In Physical Therapy              Pain:  4/10    Location:  B hips         Precautions: No recent falls

## 2024-09-25 ENCOUNTER — OFFICE VISIT (OUTPATIENT)
Dept: FAMILY MEDICINE CLINIC | Age: 74
End: 2024-09-25
Payer: MEDICARE

## 2024-09-25 VITALS
SYSTOLIC BLOOD PRESSURE: 140 MMHG | HEART RATE: 70 BPM | OXYGEN SATURATION: 98 % | TEMPERATURE: 97.5 F | HEIGHT: 76 IN | WEIGHT: 210 LBS | DIASTOLIC BLOOD PRESSURE: 68 MMHG | BODY MASS INDEX: 25.57 KG/M2

## 2024-09-25 DIAGNOSIS — K59.09 OTHER CONSTIPATION: Primary | ICD-10-CM

## 2024-09-25 PROCEDURE — 3078F DIAST BP <80 MM HG: CPT | Performed by: NURSE PRACTITIONER

## 2024-09-25 PROCEDURE — G8417 CALC BMI ABV UP PARAM F/U: HCPCS | Performed by: NURSE PRACTITIONER

## 2024-09-25 PROCEDURE — 1123F ACP DISCUSS/DSCN MKR DOCD: CPT | Performed by: NURSE PRACTITIONER

## 2024-09-25 PROCEDURE — 1036F TOBACCO NON-USER: CPT | Performed by: NURSE PRACTITIONER

## 2024-09-25 PROCEDURE — 99214 OFFICE O/P EST MOD 30 MIN: CPT | Performed by: NURSE PRACTITIONER

## 2024-09-25 PROCEDURE — G8427 DOCREV CUR MEDS BY ELIG CLIN: HCPCS | Performed by: NURSE PRACTITIONER

## 2024-09-25 PROCEDURE — 3077F SYST BP >= 140 MM HG: CPT | Performed by: NURSE PRACTITIONER

## 2024-09-25 RX ORDER — DOCUSATE SODIUM 100 MG/1
100 CAPSULE, LIQUID FILLED ORAL 2 TIMES DAILY
Qty: 60 CAPSULE | Refills: 0 | Status: SHIPPED | OUTPATIENT
Start: 2024-09-25 | End: 2024-10-25

## 2024-09-25 RX ORDER — WHEAT DEXTRIN 3 G/3.8 G
4 POWDER (GRAM) ORAL
Qty: 80 G | Refills: 2 | Status: SHIPPED | OUTPATIENT
Start: 2024-09-25

## 2024-09-25 ASSESSMENT — ENCOUNTER SYMPTOMS
ABDOMINAL PAIN: 0
SHORTNESS OF BREATH: 0
VOMITING: 0
DIARRHEA: 0
NAUSEA: 0
CHEST TIGHTNESS: 0
CONSTIPATION: 1

## 2024-09-26 ENCOUNTER — TREATMENT (OUTPATIENT)
Dept: PHYSICAL THERAPY | Facility: CLINIC | Age: 74
End: 2024-09-26
Payer: MEDICARE

## 2024-09-26 DIAGNOSIS — M25.551 BILATERAL HIP PAIN: ICD-10-CM

## 2024-09-26 DIAGNOSIS — M25.552 BILATERAL HIP PAIN: ICD-10-CM

## 2024-09-26 PROCEDURE — 97140 MANUAL THERAPY 1/> REGIONS: CPT | Mod: GP,CQ

## 2024-09-26 PROCEDURE — 97110 THERAPEUTIC EXERCISES: CPT | Mod: GP,CQ

## 2024-09-26 NOTE — PROGRESS NOTES
Patient Name: Jose Alejandro Tello  MRN: [id number]  Today's Date: 9/26/2024  Time Calculation  Start Time: 0104  Stop Time: 0145  Time Calculation (min): 41 min  [facility] Medicare  0% coins, no ded, $1500 oop ($160 met), no copay, bmn [place] yr, AUTH REQ AFTER 9/1/24  Visit 6 of 20 (re-assess at 10)  Subjective:  States that he continues to have burning sensation in B glutes. Reports DOMS post last session. Still sore.  Reports burning sensation does lessen as day progresses .     Objective:  Prone quad flexibility R/L 110 /95 before spine motion     Assessment:   Decreased strengthening due to muscle soreness .Focused on increasing joint mobility/flexibility.  Post session pt reports decreased hip stiffness.     Plan:   Resume strengthening NV     Treatment :   Ther Ex 32783: 30 minutes /2 units   Nustep (seat 15, UEs 14) L2 5 min    Ham, hip flexor stretch at step, :30 1x each  Psoas stretch : lunge with trunk rotation to opp side, :30 1x NV   Adductor stretch with side toward step, :30 1x   Modified pigeon pose (fig 4 stretch) on EOB, :30 1x  SL hip abduction 2x10  NV   SB  hamstring isometrics 5 seconds x10   SLR abduct rainbow, 10x NV   LTR with big green SB, :05 5x  BKFO :05 5x  KTOS :30 2x each  DKTC with SB x10    Side stepping RTB 4 laps along rail  NV   Side stepping without resistance 4 laps   Paloff press with Cable column 12.5 # 2x15 NV   Sit to stand x10   MANUAL TECHNIQUES  73221 11 minutes 1 unit   Prone quad stretching /internal /external rotation ROM/stretching   TP release Glute med B             Current Problem:  1. Bilateral hip pain  Follow Up In Physical Therapy              Pain:  0/10         Precautions: No recent falls

## 2024-09-30 ENCOUNTER — TREATMENT (OUTPATIENT)
Dept: PHYSICAL THERAPY | Facility: CLINIC | Age: 74
End: 2024-09-30
Payer: MEDICARE

## 2024-09-30 DIAGNOSIS — M62.89 MUSCLE TIGHTNESS: Primary | ICD-10-CM

## 2024-09-30 DIAGNOSIS — M25.551 BILATERAL HIP PAIN: ICD-10-CM

## 2024-09-30 DIAGNOSIS — M25.552 BILATERAL HIP PAIN: ICD-10-CM

## 2024-09-30 PROCEDURE — 97110 THERAPEUTIC EXERCISES: CPT | Mod: GP | Performed by: PHYSICAL THERAPIST

## 2024-09-30 ASSESSMENT — PAIN SCALES - GENERAL: PAINLEVEL_OUTOF10: 0 - NO PAIN

## 2024-09-30 NOTE — PROGRESS NOTES
Patient Name: Jose Alejandro Tello  MRN: [id number]  Today's Date: 9/30/2024  Time Calculation  Start Time: 1409  Stop Time: 1453  Time Calculation (min): 44 min  [facility] Medicare  0% coins, no ded, $1500 oop ($160 met), no copay, bmn [place] yr, AUTH REQ AFTER 9/1/24  Visit 7 of 20 (re-assess at 10)    Subjective:  Pt notes that he is doing well today, but cont to have burning pain in the glutes in the AM.    Objective:      Assessment:   Pt reported good improvement and less groin pinching with KTOS after MTT. He noted muscle fatigue and burning during exercises, but still able to complete with decent form.    Plan:   Progress as jorge l with functional hip mobility and LE strengthening.     Treatment :   Ther Ex 51129: 38/3  Nustep (seat 15, UEs 14) L4 5 min    Ham, hip flexor stretch at step, :30 1x each  Psoas stretch : lunge with trunk rotation to opp side, :30 1x    Adductor stretch with side toward step, :30 1x   LTR with big green SB, :05 5x  SB  hamstring isometrics 5 seconds x10   DKTC with SB x10   SB long leg bridges with TA, 10x  BKFO :05 5x  KTOS :30 1x each  SLR abduct rainbow, 10x    Sit to stand x10   Paloff press with Cable column 15 # 10x    Side stepping RTB 6 laps along rail   Modified pigeon pose (fig 4 stretch) on EOB, :30 1x    Not performed today:  SL hip abduction 2x10  NV   Side stepping without resistance 4 laps     MANUAL Ther Tech 88683: 4/0  Belt-assisted lateral hip glide, gr 2-3 in hooklying    Not performed today:  Prone quad stretching /internal /external rotation ROM/stretching   TP release Glute med B     Current Problem:  1. Muscle tightness        2. Bilateral hip pain  Follow Up In Physical Therapy          Pain:  1-2/10        Precautions: No recent falls

## 2024-10-04 ENCOUNTER — TREATMENT (OUTPATIENT)
Dept: PHYSICAL THERAPY | Facility: CLINIC | Age: 74
End: 2024-10-04
Payer: MEDICARE

## 2024-10-04 DIAGNOSIS — M25.551 BILATERAL HIP PAIN: ICD-10-CM

## 2024-10-04 DIAGNOSIS — M25.552 BILATERAL HIP PAIN: ICD-10-CM

## 2024-10-04 DIAGNOSIS — M62.89 MUSCLE TIGHTNESS: Primary | ICD-10-CM

## 2024-10-04 PROCEDURE — 97112 NEUROMUSCULAR REEDUCATION: CPT | Mod: GP | Performed by: PHYSICAL THERAPIST

## 2024-10-04 PROCEDURE — 97110 THERAPEUTIC EXERCISES: CPT | Mod: GP | Performed by: PHYSICAL THERAPIST

## 2024-10-04 NOTE — PROGRESS NOTES
Patient Name: Jose Alejandro Tello  MRN: [id number]  Today's Date: 10/4/2024  Time Calculation  Start Time: 1434  Stop Time: 1457  Time Calculation (min): 23 min  [facility] Medicare  0% coins, no ded, $1500 oop ($160 met), no copay, bmn [place] yr, AUTH REQ AFTER 9/1/24  Visit 8 of 20 (re-assess at 10)    Subjective:  Pt notes that he needs to leave by 3pm.     Objective:    Assessment:   Program was changed today to reflect time constraint and combine several things into fewer exercises, such as stretch with strengthening, and strength with balance.  Pt was well-challenged with program today, noting he felt burning from muscle effort and stretch throughout.     Plan:   Progress as jorge l with functional hip mobility and LE strengthening.     Treatment :   Ther Ex 82483: 14/1  Ham, hip flexor stretch at step, :30 1x each  Lateral lunge walk 20 ft 1 lap.  Modified pigeon pose (fig 4 stretch) on EOB, :30 1x  Paloff press with Cable column 15 # 10x   Tband diagonal chops OH to opp hip, black x1, 10x each  Tband diagonal chops from hip to opp OH, black x1, 10x each  LTR with big green SB, :05 5x    Not performed today:  Nustep (seat 15, UEs 14) L4 5 min   Adductor stretch with side toward step, :30 1x   SL hip abduction 2x10  NV   SB  hamstring isometrics 5 seconds x10   DKTC with SB x10   SB long leg bridges with TA, 10x  BKFO :05 5x  KTOS :30 1x each  SLR abduct rainbow, 10x    Sit to stand x10    Side stepping RTB 6 laps along rail     Neuromusc Re-edu 12742: 9/1  Double foam bar F/R tandem walk 2 laps  Double foam bar sidestepping on toes and on heels, 1 lap each  Psoas stretch : lunge  onto top step with hand clasp/trunk rotation to opp side, :05 5x each     MANUAL Ther Tech 18932: ---    Current Problem:  1. Muscle tightness        2. Bilateral hip pain  Follow Up In Physical Therapy        Pain:  2/10        Precautions: No recent falls

## 2024-10-10 ENCOUNTER — OFFICE VISIT (OUTPATIENT)
Dept: PULMONOLOGY | Age: 74
End: 2024-10-10
Payer: MEDICARE

## 2024-10-10 VITALS
BODY MASS INDEX: 27.08 KG/M2 | DIASTOLIC BLOOD PRESSURE: 69 MMHG | SYSTOLIC BLOOD PRESSURE: 139 MMHG | TEMPERATURE: 97.5 F | RESPIRATION RATE: 16 BRPM | WEIGHT: 217.8 LBS | HEART RATE: 63 BPM | HEIGHT: 75 IN | OXYGEN SATURATION: 98 %

## 2024-10-10 DIAGNOSIS — J44.9 CHRONIC OBSTRUCTIVE PULMONARY DISEASE, UNSPECIFIED COPD TYPE (HCC): ICD-10-CM

## 2024-10-10 DIAGNOSIS — J84.10 PULMONARY FIBROSIS (HCC): ICD-10-CM

## 2024-10-10 DIAGNOSIS — G47.33 OSA (OBSTRUCTIVE SLEEP APNEA): Primary | ICD-10-CM

## 2024-10-10 DIAGNOSIS — J43.2 CENTRILOBULAR EMPHYSEMA (HCC): ICD-10-CM

## 2024-10-10 PROCEDURE — G8427 DOCREV CUR MEDS BY ELIG CLIN: HCPCS | Performed by: INTERNAL MEDICINE

## 2024-10-10 PROCEDURE — 99214 OFFICE O/P EST MOD 30 MIN: CPT | Performed by: INTERNAL MEDICINE

## 2024-10-10 PROCEDURE — G8417 CALC BMI ABV UP PARAM F/U: HCPCS | Performed by: INTERNAL MEDICINE

## 2024-10-10 PROCEDURE — 3075F SYST BP GE 130 - 139MM HG: CPT | Performed by: INTERNAL MEDICINE

## 2024-10-10 PROCEDURE — 3023F SPIROM DOC REV: CPT | Performed by: INTERNAL MEDICINE

## 2024-10-10 PROCEDURE — G8484 FLU IMMUNIZE NO ADMIN: HCPCS | Performed by: INTERNAL MEDICINE

## 2024-10-10 PROCEDURE — 1123F ACP DISCUSS/DSCN MKR DOCD: CPT | Performed by: INTERNAL MEDICINE

## 2024-10-10 PROCEDURE — 1036F TOBACCO NON-USER: CPT | Performed by: INTERNAL MEDICINE

## 2024-10-10 PROCEDURE — 3078F DIAST BP <80 MM HG: CPT | Performed by: INTERNAL MEDICINE

## 2024-10-10 RX ORDER — DARIFENACIN 15 MG/1
15 TABLET, EXTENDED RELEASE ORAL DAILY
COMMUNITY

## 2024-10-10 RX ORDER — BUDESONIDE AND FORMOTEROL FUMARATE DIHYDRATE 80; 4.5 UG/1; UG/1
2 AEROSOL RESPIRATORY (INHALATION) 2 TIMES DAILY
Qty: 10.2 G | Refills: 3 | Status: SHIPPED | OUTPATIENT
Start: 2024-10-10

## 2024-10-10 NOTE — PROGRESS NOTES
Subjective:     Isidro Kaye is a 74 y.o. male who complains today of:     Chief Complaint   Patient presents with    Follow-up     6 Week F/U for COPD, Pulmonary fibrosis and Sleep Apnea    Results     HST       HPI  Patient presents for shortness of breath    10/10/2024  Patient presents for follow-up, has no coughing, still have some mild shortness of breath and daytime tiredness, he is currently being treated for PMR he is on low-dose prednisone.  Denies chest pain, no fever no chills, no lower extremity edema, he quit taking Symbicort.    8/14/2024  Presents for follow-up, he had COVID-19 infection recently and since then he has been having dry cough feels like throat clearing, no chest pain, he has mild dyspnea on exertion, he reported weakness at the beginning of his activity, mainly involve proximal muscles then he gets better the more activity he does.  No chest pain, no fever, weight is stable, no lower extremity edema, he reports snoring and frequent urination and awaking at night.  He was unable to finish spirometry, he got dizzy, and lightheaded he was sent to emergency room.  He did 6-minute walk test today    6/6/2024  Patient complains of dyspnea on exertion, he report his symptoms as legs weakness and tightness, mainly with exertion, he is able to bike for almost 6 miles but initially at the beginning of the activity he feels his legs are tight and he has to stop.  He has no chest pain, no coughing, no fever no chills, no wheezing, his weight is stable, no skin rash.  He was evaluated by neurology Dr. Hahn and rheumatology in [place] and workup has been negative I do not have access to the results.  He was previously seen by me and had a PFT which was normal.  He has no symptoms suggestive of sleep apnea.             Allergies:  Ticagrelor, Ezetimibe, Other, Statins, and Propofol  Past Medical History:   Diagnosis Date    CAD (coronary artery disease)     Congenital heart disease

## 2024-10-11 RX ORDER — LEVOTHYROXINE SODIUM 100 UG/1
100 TABLET ORAL DAILY
Qty: 90 TABLET | Refills: 0 | Status: SHIPPED | OUTPATIENT
Start: 2024-10-11

## 2024-10-15 ENCOUNTER — OFFICE VISIT (OUTPATIENT)
Dept: GASTROENTEROLOGY | Age: 74
End: 2024-10-15
Payer: MEDICARE

## 2024-10-15 VITALS — HEIGHT: 75 IN | HEART RATE: 68 BPM | OXYGEN SATURATION: 98 % | WEIGHT: 216 LBS | BODY MASS INDEX: 26.86 KG/M2

## 2024-10-15 DIAGNOSIS — K59.00 CONSTIPATION, UNSPECIFIED CONSTIPATION TYPE: Primary | ICD-10-CM

## 2024-10-15 DIAGNOSIS — R10.13 EPIGASTRIC PAIN: ICD-10-CM

## 2024-10-15 PROCEDURE — G8417 CALC BMI ABV UP PARAM F/U: HCPCS | Performed by: INTERNAL MEDICINE

## 2024-10-15 PROCEDURE — 1123F ACP DISCUSS/DSCN MKR DOCD: CPT | Performed by: INTERNAL MEDICINE

## 2024-10-15 PROCEDURE — 1036F TOBACCO NON-USER: CPT | Performed by: INTERNAL MEDICINE

## 2024-10-15 PROCEDURE — G8484 FLU IMMUNIZE NO ADMIN: HCPCS | Performed by: INTERNAL MEDICINE

## 2024-10-15 PROCEDURE — G8427 DOCREV CUR MEDS BY ELIG CLIN: HCPCS | Performed by: INTERNAL MEDICINE

## 2024-10-15 PROCEDURE — 99203 OFFICE O/P NEW LOW 30 MIN: CPT | Performed by: INTERNAL MEDICINE

## 2024-10-15 RX ORDER — OMEPRAZOLE 40 MG/1
40 CAPSULE, DELAYED RELEASE ORAL DAILY
Qty: 30 CAPSULE | Refills: 3 | Status: SHIPPED | OUTPATIENT
Start: 2024-10-15

## 2024-10-15 RX ORDER — PREDNISONE 5 MG/1
5 TABLET ORAL DAILY
COMMUNITY

## 2024-10-15 NOTE — PROGRESS NOTES
Ran Out of Food in the Last Year: Never true   Transportation Needs: Unknown (6/19/2024)    PRAPARE - Transportation     Lack of Transportation (Non-Medical): No   Physical Activity: Sufficiently Active (6/21/2024)    Exercise Vital Sign     Days of Exercise per Week: 7 days     Minutes of Exercise per Session: 60 min   Housing Stability: Unknown (6/19/2024)    Housing Stability Vital Sign     Unstable Housing in the Last Year: No     Pulse 68, height 1.905 m (6' 3\"), weight 98 kg (216 lb), SpO2 98%.  Physical Exam  Constitutional:       General: He is not in acute distress.     Appearance: Normal appearance. He is well-developed.   Eyes:      General: No scleral icterus.  Cardiovascular:      Rate and Rhythm: Normal rate and regular rhythm.   Pulmonary:      Effort: Pulmonary effort is normal.      Breath sounds: Normal breath sounds.   Abdominal:      General: Bowel sounds are normal. There is no distension.      Palpations: Abdomen is soft. There is no mass.      Tenderness: There is no abdominal tenderness. There is no guarding or rebound.   Musculoskeletal:         General: Normal range of motion.   Lymphadenopathy:      Cervical: No cervical adenopathy.   Neurological:      Mental Status: He is alert and oriented to person, place, and time.   Psychiatric:         Behavior: Behavior normal.         Thought Content: Thought content normal.         Judgment: Judgment normal.       Laboratory, Pathology, Radiology reviewed indetail with relevant important investigations summarized below:  Lab Results   Component Value Date    WBC 6.4 08/06/2024    HGB 11.2 (L) 08/06/2024    HCT 34.5 (L) 08/06/2024    MCV 92.7 (H) 08/06/2024     08/06/2024     Lab Results   Component Value Date    IRON 84 02/17/2023    TIBC 270 10/16/2020    FERRITIN 95 02/17/2023     Lab Results   Component Value Date    HIBZWVEH40 702 04/16/2024      Lab Results   Component Value Date    FOLATE 9.7 10/16/2020     No results found for:

## 2024-12-04 PROBLEM — G47.33 OSA (OBSTRUCTIVE SLEEP APNEA): Status: ACTIVE | Noted: 2024-12-04

## 2025-01-08 ENCOUNTER — OFFICE VISIT (OUTPATIENT)
Age: 75
End: 2025-01-08

## 2025-01-08 VITALS
HEIGHT: 75 IN | DIASTOLIC BLOOD PRESSURE: 62 MMHG | TEMPERATURE: 97.3 F | OXYGEN SATURATION: 98 % | HEART RATE: 70 BPM | BODY MASS INDEX: 26.86 KG/M2 | SYSTOLIC BLOOD PRESSURE: 116 MMHG | WEIGHT: 216 LBS

## 2025-01-08 DIAGNOSIS — L85.3 DRY SKIN DERMATITIS: Primary | ICD-10-CM

## 2025-01-08 RX ORDER — UREA 8.5 G/85G
CREAM TOPICAL
Qty: 71 G | Refills: 0 | Status: SHIPPED | OUTPATIENT
Start: 2025-01-08

## 2025-01-08 NOTE — PROGRESS NOTES
Subjective:      Patient ID: Isidro Kaye is a 74 y.o. male who presents today for:  Chief Complaint   Patient presents with    Finger Pain     Pt states possible fiber glass in left pointer finger. Over a month ago. Pain comes and goes.        HPI  Patient is here with c/o tenderness to left index finger after an exposure to fiberglass 1 month ago.   He reports he has tried to remove it and still giving him pain.   Denies any fever or chills. Denies any redness or swelling.  Past Medical History:   Diagnosis Date    CAD (coronary artery disease)     Congenital heart disease     Coronary artery disease     Heart attack (HCC) 10/30/2017    Hyperlipidemia     Hypertension     Hypothyroidism     Knee pain     MI (myocardial infarction) (formerly [facility])      Past Surgical History:   Procedure Laterality Date    CARDIAC CATHETERIZATION  14    DR HOLT    CORONARY ANGIOPLASTY WITH STENT PLACEMENT  2008    VASECTOMY       Social History     Socioeconomic History    Marital status:      Spouse name: Not on file    Number of children: Not on file    Years of education: Not on file    Highest education level: Not on file   Occupational History    Not on file   Tobacco Use    Smoking status: Former     Current packs/day: 0.00     Average packs/day: 1.5 packs/day for 20.0 years (30.0 ttl pk-yrs)     Types: Cigarettes     Start date:      Quit date:      Years since quittin.0    Smokeless tobacco: Never   Vaping Use    Vaping status: Never Used   Substance and Sexual Activity    Alcohol use: Not Currently     Alcohol/week: 2.0 standard drinks of alcohol     Types: 1 Glasses of wine, 1 Cans of beer per week     Comment: rarely     Drug use: No    Sexual activity: Yes     Partners: Female   Other Topics Concern    Not on file   Social History Narrative    Not on file     Social Determinants of Health     Financial Resource Strain: Low Risk  (2024)    Overall Financial Resource Strain (CARDIA)

## 2025-01-10 ENCOUNTER — APPOINTMENT (OUTPATIENT)
Dept: INFUSION THERAPY | Facility: CLINIC | Age: 75
End: 2025-01-10
Payer: MEDICARE

## 2025-01-10 VITALS
TEMPERATURE: 97.6 F | OXYGEN SATURATION: 99 % | RESPIRATION RATE: 18 BRPM | DIASTOLIC BLOOD PRESSURE: 76 MMHG | HEART RATE: 59 BPM | SYSTOLIC BLOOD PRESSURE: 144 MMHG

## 2025-01-10 DIAGNOSIS — Z78.9 STATIN INTOLERANCE: ICD-10-CM

## 2025-01-10 DIAGNOSIS — E78.2 MIXED HYPERLIPIDEMIA: ICD-10-CM

## 2025-01-10 RX ORDER — FAMOTIDINE 10 MG/ML
20 INJECTION INTRAVENOUS ONCE AS NEEDED
Status: DISCONTINUED | OUTPATIENT
Start: 2025-01-10 | End: 2025-01-10 | Stop reason: HOSPADM

## 2025-01-10 RX ORDER — DIPHENHYDRAMINE HYDROCHLORIDE 50 MG/ML
50 INJECTION INTRAMUSCULAR; INTRAVENOUS AS NEEDED
Status: DISCONTINUED | OUTPATIENT
Start: 2025-01-10 | End: 2025-01-10 | Stop reason: HOSPADM

## 2025-01-10 RX ORDER — FAMOTIDINE 10 MG/ML
20 INJECTION INTRAVENOUS ONCE AS NEEDED
Status: CANCELLED | OUTPATIENT
Start: 2025-01-13

## 2025-01-10 RX ORDER — ALBUTEROL SULFATE 0.83 MG/ML
3 SOLUTION RESPIRATORY (INHALATION) AS NEEDED
Status: CANCELLED | OUTPATIENT
Start: 2025-01-13

## 2025-01-10 RX ORDER — EPINEPHRINE 0.3 MG/.3ML
0.3 INJECTION SUBCUTANEOUS EVERY 5 MIN PRN
Status: CANCELLED | OUTPATIENT
Start: 2025-01-13

## 2025-01-10 RX ORDER — EPINEPHRINE 0.3 MG/.3ML
0.3 INJECTION SUBCUTANEOUS EVERY 5 MIN PRN
Status: DISCONTINUED | OUTPATIENT
Start: 2025-01-10 | End: 2025-01-10 | Stop reason: HOSPADM

## 2025-01-10 RX ORDER — ALBUTEROL SULFATE 0.83 MG/ML
3 SOLUTION RESPIRATORY (INHALATION) AS NEEDED
Status: DISCONTINUED | OUTPATIENT
Start: 2025-01-10 | End: 2025-01-10 | Stop reason: HOSPADM

## 2025-01-10 RX ORDER — DIPHENHYDRAMINE HYDROCHLORIDE 50 MG/ML
50 INJECTION INTRAMUSCULAR; INTRAVENOUS AS NEEDED
Status: CANCELLED | OUTPATIENT
Start: 2025-01-13

## 2025-01-10 ASSESSMENT — ENCOUNTER SYMPTOMS
ARTHRALGIAS: 1
DIZZINESS: 0
LEG SWELLING: 0
EXTREMITY WEAKNESS: 0
PALPITATIONS: 0
NUMBNESS: 0
COUGH: 0
WOUND: 0
SHORTNESS OF BREATH: 0
WHEEZING: 0
LIGHT-HEADEDNESS: 0

## 2025-01-10 NOTE — PATIENT INSTRUCTIONS
Today :We administered inclisiran.     For:   1. Mixed hyperlipidemia    2. Statin intolerance         Your next appointment is due in:  6 months        Please read the  Medication Guide that was given to you and reviewed during todays visit.     (Tell all doctors including dentists that you are taking this medication)     Go to the emergency room or call 911 if:  -You have signs of allergic reaction:   -Rash, hives, itching.   -Swollen, blistered, peeling skin.   -Swelling of face, lips, mouth, tongue or throat.   -Tightness of chest, trouble breathing, swallowing or talking     Call your doctor:  - If IV / injection site gets red, warm, swollen, itchy or leaks fluid or pus.     (Leave dressing on your IV site for at least 2 hours and keep area clean and dry  - If you get sick or have symptoms of infection or are not feeling well for any reason.    (Wash your hands often, stay away from people who are sick)  - If you have side effects from your medication that do not go away or are bothersome.     (Refer to the teaching your nurse gave you for side effects to call your doctor about)    - Common side effects may include:  stuffy nose, headache, feeling tired, muscle aches, upset stomach  - Before receiving any vaccines     - Call [facility] at   If:  - You get sick, are on antibiotics, have had a recent vaccine, have surgery or dental work and your doctor wants your visit rescheduled.  - You need to cancel and reschedule your visit for any reason. Call at least 2 days before your visit if you need to cancel.   - Your insurance changes before your next visit.    (We will need to get approval from your new insurance. This can take up to two weeks.)     The [facility] is opened Monday thru Friday. We are closed on weekends and holidays.   Voice mail will take your call if the center is closed. If you leave a message please allow 24 hours for a call back during weekdays. If you  leave a message on a weekend/holiday, we will call you back the next business day.    A pharmacist is available Monday - Friday from 8:30AM to 3:30PM to help answer any questions you may have about your prescriptions(s). Please call pharmacy at:    [facility]: [phone]  [facility]: [phone]  [facility]: [phone]

## 2025-01-10 NOTE — PROGRESS NOTES
[facility]   Infusion Clinic Note   Date: January 10, 2025   Name: Jose Alejandro Tello  : 1950   MRN: [id number]          Reason for Visit: Injections (Leqvio 284 mg)         Today: We administered inclisiran.       Visit Type: INJECTION       Ordered By: Natalya Frazier MD       Accompanied by:Self       Diagnosis: Mixed hyperlipidemia    Statin intolerance        Allergies:   Allergies as of 01/10/2025 - Reviewed 01/10/2025   Allergen Reaction Noted    Ticagrelor Shortness of breath 10/12/2023    Ezetimibe Unknown 10/12/2023    Other Unknown 10/12/2023    Rosuvastatin Unknown 10/12/2023    Simvastatin Unknown 10/12/2023          Current Medications has a current medication list which includes the following prescription(s): amlodipine, aspirin, carvedilol, cyanocobalamin, leqvio, multivitamin, nitroglycerin, and synthroid, and the following Facility-Administered Medications: albuterol, dextrose, diphenhydramine, epinephrine, famotidine pf, inclisiran, methylprednisolone sod succinate, and sodium chloride.       Vitals:   Vitals:    01/10/25 0908   BP: 144/76   Pulse: 59   Resp: 18   Temp: 36.4 °C (97.6 °F)   SpO2: 99%             Infusion Pre-procedure Checklist:   - Allergies reviewed: yes   - Medications reviewed: yes       - Previous reaction to current treatment: no      Assess patient for the concerns below. Document provider notification as appropriate.  - Active or recent infection with/without current antibiotic use: no  - Recent or planned invasive dental work: no  - Recent or planned surgeries: no  - Recently received or plans to receive vaccinations: no  - Has treatment related toxicities: no  - Is pregnant:  n/a      Pain: 0   - Is the pain different from normal: n/a   - Is your Doctor aware:  n/a       Labs:  viewed          Fall Risk Screening: Peter Fall Risk  History of Falling, Immediate or Within 3 Months: No  Secondary Diagnosis: No  Ambulatory Aid: Walks without  "aid/bedrest/nurse assist  Intravenous Therapy/Heparin Lock: No  Gait/Transferring: Normal/bedrest/immobile  Mental Status: Oriented to own ability  Green Fall Risk Score: 0       Review Of Systems:  Review of Systems   Respiratory:  Negative for cough, shortness of breath and wheezing.    Cardiovascular:  Negative for chest pain, leg swelling and palpitations.   Musculoskeletal:  Positive for arthralgias.   Skin:  Negative for itching, rash and wound.   Neurological:  Negative for dizziness, extremity weakness, light-headedness and numbness.         ROS completed? Yes      Infusion Readiness:  - Assessment Concerns Related to Infusion: No  - Provider notified: n/a      Document Below Only If Indicated:   New Patient Education:    N/A (returning patient for continuation of therapy. Ongoing education provided as needed.)        Treatment Conditions & Drug Specific Questions:    Inclisiran (LEQVIO)     (Unless otherwise specified on patient specific therapy plan):     REMINDER:  Recommended Vitals/Observation:  Vitals: Obtain vital signs prior to injection and at end of observation period.    Observation: Patient may leave 30 minutes after the FIRST injection. Patient may leave immediately following injection for all subsequent doses    No results found for: \"CHOL\"  No results found for: \"HDL\"  No results found for: \"LDLCALC\"  No results found for: \"TRIG\"  No components found for: \"CHOLHDL\"         Weight Based Drug Calculations:    WEIGHT BASED DRUGS: NOT APPLICABLE / FLAT DOSE          Initiated By: Sandy Rodríguez RN        "

## 2025-01-17 RX ORDER — LEVOTHYROXINE SODIUM 100 UG/1
100 TABLET ORAL DAILY
Qty: 90 TABLET | Refills: 3 | Status: SHIPPED | OUTPATIENT
Start: 2025-01-17

## 2025-04-04 ENCOUNTER — TELEMEDICINE (OUTPATIENT)
Age: 75
End: 2025-04-04
Payer: MEDICARE

## 2025-04-04 DIAGNOSIS — Z00.00 MEDICARE ANNUAL WELLNESS VISIT, SUBSEQUENT: Primary | ICD-10-CM

## 2025-04-04 PROCEDURE — 1159F MED LIST DOCD IN RCRD: CPT | Performed by: NURSE PRACTITIONER

## 2025-04-04 PROCEDURE — 1123F ACP DISCUSS/DSCN MKR DOCD: CPT | Performed by: NURSE PRACTITIONER

## 2025-04-04 PROCEDURE — G0439 PPPS, SUBSEQ VISIT: HCPCS | Performed by: NURSE PRACTITIONER

## 2025-04-04 SDOH — ECONOMIC STABILITY: FOOD INSECURITY: WITHIN THE PAST 12 MONTHS, YOU WORRIED THAT YOUR FOOD WOULD RUN OUT BEFORE YOU GOT MONEY TO BUY MORE.: NEVER TRUE

## 2025-04-04 SDOH — ECONOMIC STABILITY: FOOD INSECURITY: WITHIN THE PAST 12 MONTHS, THE FOOD YOU BOUGHT JUST DIDN'T LAST AND YOU DIDN'T HAVE MONEY TO GET MORE.: NEVER TRUE

## 2025-04-04 ASSESSMENT — PATIENT HEALTH QUESTIONNAIRE - PHQ9
1. LITTLE INTEREST OR PLEASURE IN DOING THINGS: NOT AT ALL
SUM OF ALL RESPONSES TO PHQ QUESTIONS 1-9: 0
SUM OF ALL RESPONSES TO PHQ QUESTIONS 1-9: 0
2. FEELING DOWN, DEPRESSED OR HOPELESS: NOT AT ALL
SUM OF ALL RESPONSES TO PHQ QUESTIONS 1-9: 0
SUM OF ALL RESPONSES TO PHQ QUESTIONS 1-9: 0

## 2025-04-04 NOTE — PROGRESS NOTES
DRY EYE SYNDROME OU: RX ARTIFICIAL TEARS AS NEEDED TO INCREASE COMFORT OU. IF SYMPTOMS PERSIST CONSIDER PUNCTAL PLUGS. Medicare Annual Wellness Visit    Isidro Kaye is here for Medicare AWV    Assessment & Plan   Medicare annual wellness visit, subsequent       Return in 1 year (on 4/4/2026) for Medicare AW.     Subjective       Patient's complete Health Risk Assessment and screening values have been reviewed and are found in Flowsheets. The following problems were reviewed today and where indicated follow up appointments were made and/or referrals ordered.    No Positive Risk Factors identified today.                                    Objective    Patient-Reported Vitals  No data recorded               Allergies   Allergen Reactions    Ticagrelor Shortness Of Breath    Ezetimibe Other (See Comments)    Other Other (See Comments)     Leg cramps    Statins      Leg cramps    Propofol Rash     Prior to Visit Medications    Medication Sig Taking? Authorizing Provider   levothyroxine (SYNTHROID) 100 MCG tablet TAKE 1 TABLET BY MOUTH DAILY  Rui Shane MD   urea (CARMOL) 10 % cream Apply topically as needed.  Cinthia Kelly APRN - CNP   predniSONE (DELTASONE) 5 MG tablet Take 1 tablet by mouth daily  Kimberly Cardenas MD   omeprazole (PRILOSEC) 40 MG delayed release capsule Take 1 capsule by mouth daily  Manan Mcintosh MD   darifenacin (ENABLEX) 15 MG extended release tablet Take 1 tablet by mouth daily  Kimberly Cardenas MD   budesonide-formoterol (SYMBICORT) 80-4.5 MCG/ACT AERO Inhale 2 puffs into the lungs 2 times daily  Te Almeida MD   Wheat Dextrin (BENEFIBER) POWD Take 4 g by mouth 3 times daily (with meals)  America Santamaria, HERBER - CNP   amLODIPine (NORVASC) 5 MG tablet   ProviderKimberly MD   Inclisiran Sodium (LEQVIO) 284 MG/1.5ML   Kimberly Cardenas MD   Cyanocobalamin (B-12 SUPER STRENGTH) 5000 MCG/ML LIQD Place under the tongue  Kimberly Cardenas MD   Multiple Vitamin (MULTIVITAMIN ADULT PO) Take by mouth  Kimberly Cardenas MD   carvedilol (COREG) 3.125 MG tablet Take 1 tablet by

## 2025-04-08 LAB
ALBUMIN: 4.1 G/DL
ALP BLD-CCNC: 80 U/L
ALT SERPL-CCNC: 25 U/L
ANA TITER: NORMAL
ANION GAP SERPL CALCULATED.3IONS-SCNC: NORMAL MMOL/L
AST SERPL-CCNC: 18 U/L
BASOPHILS ABSOLUTE: 30 /ΜL
BASOPHILS RELATIVE PERCENT: 0.5 %
BILIRUB SERPL-MCNC: 0.7 MG/DL (ref 0.1–1.4)
BILIRUBIN, URINE: NEGATIVE
BLOOD, URINE: NEGATIVE
BUN BLDV-MCNC: 18 MG/DL
C-REACTIVE PROTEIN: <3
CALCIUM SERPL-MCNC: 9.2 MG/DL
CHLORIDE BLD-SCNC: 101 MMOL/L
CHOLESTEROL, TOTAL: 122 MG/DL
CHOLESTEROL/HDL RATIO: 2.7
CLARITY, UA: CLEAR
CO2: NORMAL
COLOR, UA: YELLOW
CREAT SERPL-MCNC: 1.08 MG/DL
EOSINOPHILS ABSOLUTE: 48 /ΜL
EOSINOPHILS RELATIVE PERCENT: 0.8 %
ESTIMATED AVERAGE GLUCOSE: NORMAL
GFR, ESTIMATED: 71
GLUCOSE BLD-MCNC: 100 MG/DL
GLUCOSE URINE: ABNORMAL
HBA1C MFR BLD: 6 %
HCT VFR BLD CALC: 38 % (ref 41–53)
HDLC SERPL-MCNC: 45 MG/DL (ref 35–70)
HEMOGLOBIN: 13.1 G/DL (ref 13.5–17.5)
KETONES, URINE: NEGATIVE
LDL CHOLESTEROL: 56
LEUKOCYTE ESTERASE, URINE: NEGATIVE
LYMPHOCYTES ABSOLUTE: 1290 /ΜL
LYMPHOCYTES RELATIVE PERCENT: 21.5 %
MCH RBC QN AUTO: 31.6 PG
MCHC RBC AUTO-ENTMCNC: 34.5 G/DL
MCV RBC AUTO: 91.6 FL
MONOCYTES ABSOLUTE: 702 /ΜL
MONOCYTES RELATIVE PERCENT: 11.7 %
NEUTROPHILS ABSOLUTE: 3930 /ΜL
NEUTROPHILS RELATIVE PERCENT: 65.5 %
NITRITE, URINE: NEGATIVE
NONHDLC SERPL-MCNC: NORMAL MG/DL
PDW BLD-RTO: 12.2 %
PH UA: 6.5 (ref 4.5–8)
PLATELET # BLD: 296 K/ΜL
PMV BLD AUTO: 9.6 FL
POTASSIUM SERPL-SCNC: 4.6 MMOL/L
PROTEIN UA: NEGATIVE
RBC # BLD: 4.15 10^6/ΜL
RHEUMATOID FACTOR: <10
SED RATE, AUTOMATED: 25
SODIUM BLD-SCNC: 135 MMOL/L
SPECIFIC GRAVITY UA: 1.01 (ref 1–1.03)
T3 FREE: 3
T4 FREE: 1.3
TOTAL PROTEIN: 7 G/DL (ref 6.4–8.2)
TRIGL SERPL-MCNC: 127 MG/DL
TSH SERPL DL<=0.05 MIU/L-ACNC: 1.89 UIU/ML
URIC ACID: 5.6
UROBILINOGEN, URINE: ABNORMAL
VITAMIN B-12: 777
VITAMIN D 25-HYDROXY: 52
VLDLC SERPL CALC-MCNC: NORMAL MG/DL
WBC # BLD: 6 10^3/ML

## 2025-04-28 DIAGNOSIS — I10 PRIMARY HYPERTENSION: ICD-10-CM

## 2025-04-28 RX ORDER — AMLODIPINE BESYLATE 5 MG/1
5 TABLET ORAL DAILY
Qty: 90 TABLET | Refills: 0 | Status: SHIPPED | OUTPATIENT
Start: 2025-04-28 | End: 2026-04-28

## 2025-04-28 NOTE — TELEPHONE ENCOUNTER
Pt is scheduled 05/20/2025. Temporary 90 day supply with no refills pended for approval as it is a mail order pharmacy. [facility]

## 2025-05-20 ENCOUNTER — APPOINTMENT (OUTPATIENT)
Dept: CARDIOLOGY | Facility: CLINIC | Age: 75
End: 2025-05-20
Payer: MEDICARE

## 2025-05-23 DIAGNOSIS — E78.2 MIXED HYPERLIPIDEMIA: Primary | ICD-10-CM

## 2025-05-23 DIAGNOSIS — Z78.9 STATIN INTOLERANCE: ICD-10-CM

## 2025-05-27 ENCOUNTER — APPOINTMENT (OUTPATIENT)
Dept: CARDIOLOGY | Facility: CLINIC | Age: 75
End: 2025-05-27
Payer: MEDICARE

## 2025-05-27 VITALS
WEIGHT: 227.2 LBS | DIASTOLIC BLOOD PRESSURE: 62 MMHG | BODY MASS INDEX: 27.67 KG/M2 | SYSTOLIC BLOOD PRESSURE: 138 MMHG | HEART RATE: 63 BPM | HEIGHT: 76 IN

## 2025-05-27 DIAGNOSIS — Z78.9 STATIN INTOLERANCE: ICD-10-CM

## 2025-05-27 DIAGNOSIS — I25.10 CORONARY ARTERY DISEASE INVOLVING NATIVE CORONARY ARTERY OF NATIVE HEART WITHOUT ANGINA PECTORIS: ICD-10-CM

## 2025-05-27 DIAGNOSIS — E78.2 MIXED HYPERLIPIDEMIA: ICD-10-CM

## 2025-05-27 DIAGNOSIS — I10 HYPERTENSION, UNSPECIFIED TYPE: ICD-10-CM

## 2025-05-27 DIAGNOSIS — I10 PRIMARY HYPERTENSION: ICD-10-CM

## 2025-05-27 DIAGNOSIS — I45.89 CHRONOTROPIC INCOMPETENCE: ICD-10-CM

## 2025-05-27 DIAGNOSIS — M79.10 MYALGIA: ICD-10-CM

## 2025-05-27 PROCEDURE — 3078F DIAST BP <80 MM HG: CPT | Performed by: INTERNAL MEDICINE

## 2025-05-27 PROCEDURE — 1036F TOBACCO NON-USER: CPT | Performed by: INTERNAL MEDICINE

## 2025-05-27 PROCEDURE — 3075F SYST BP GE 130 - 139MM HG: CPT | Performed by: INTERNAL MEDICINE

## 2025-05-27 PROCEDURE — G2211 COMPLEX E/M VISIT ADD ON: HCPCS | Performed by: INTERNAL MEDICINE

## 2025-05-27 PROCEDURE — 1160F RVW MEDS BY RX/DR IN RCRD: CPT | Performed by: INTERNAL MEDICINE

## 2025-05-27 PROCEDURE — 1159F MED LIST DOCD IN RCRD: CPT | Performed by: INTERNAL MEDICINE

## 2025-05-27 PROCEDURE — 99214 OFFICE O/P EST MOD 30 MIN: CPT | Performed by: INTERNAL MEDICINE

## 2025-05-27 RX ORDER — PREDNISONE 10 MG/1
10 TABLET ORAL DAILY
COMMUNITY

## 2025-05-27 ASSESSMENT — ENCOUNTER SYMPTOMS
STRIDOR: 0
ABDOMINAL PAIN: 0
FEVER: 0
SPEECH DIFFICULTY: 0
ALLERGIC/IMMUNOLOGIC NEGATIVE: 1
TROUBLE SWALLOWING: 0
EYES NEGATIVE: 1
HEMATURIA: 0
CHOKING: 0
DIAPHORESIS: 0
HEMATOLOGIC/LYMPHATIC NEGATIVE: 1
VOMITING: 0
CHILLS: 0
JOINT SWELLING: 0
ENDOCRINE NEGATIVE: 1
EYE DISCHARGE: 0

## 2025-05-27 NOTE — PROGRESS NOTES
Chief Complaint:    Chief Complaint   Patient presents with    Follow-up     Presents today for follow up of HTN/hyperlipidemia   Patient spends his summer months in [place] and winter months in [place] accompanied by wife to the office.  Last seen in May 2024.  Well clinically, diffuse muscle aches are still persistent, he has a diagnosis now of polymyalgia rheumatica, and is on low-dose maintenance steroids.  Subjective :     Review of Systems   Constitutional:  Negative for chills, diaphoresis and fever.   HENT: Negative.  Negative for drooling, ear discharge, mouth sores and trouble swallowing.    Eyes: Negative.  Negative for discharge.   Respiratory:  Negative for choking and stridor.    Cardiovascular:         Interval review of systems is negative for chest discomfort pressure tightness heaviness palpitations lightheadedness orthopnea paroxysmal nocturnal dyspnea dependent edema or claudication TIA or CVA type symptoms or bleeding diathesis   Gastrointestinal:  Negative for abdominal pain and vomiting.   Endocrine: Negative.  Negative for cold intolerance and heat intolerance.   Genitourinary: Negative.  Negative for hematuria.   Musculoskeletal:  Negative for joint swelling.   Skin:  Negative for pallor.   Allergic/Immunologic: Negative.  Negative for immunocompromised state.   Neurological:  Negative for syncope and speech difficulty.   Hematological: Negative.    Psychiatric/Behavioral:  Negative for suicidal ideas.         History so Far :    1. Atherosclerotic native vessel coronary artery disease.  Inferior-functional class I.  2. Inferior ST-segment elevation myocardial infarction October 2017-underwent emergent percutaneous coronary intervention and  drug-eluting stent to the right coronary artery at [facility].  3. Remote inferior wall myocardial infarction, at which time, the  patient received 4.0 x 28 mm Vision bare metal stent that was post  dilated with a 4.5-mm noncompliant  "balloon (right coronary artery).  4. Non-flow limiting disease, left system.  5. Preserved left ventricular ejection fraction. Left atrial size  4.2 cm by echo May 2016.  6. Dyslipidemia, LDL goal 70 or below, intolerance to statins.  7. Hypertension-controlled.  8. Statin intolerance-currently pt off Praluent and refusing to go on it .tolerating Had excellent  response to Praluent. Was prescribed bempedoic acid by physician in [place]  9. PVR rest and exercise October 2020-  10. Lower extremity arterial duplex October 2020-no evidence of hemodynamically significant arterial stenosis in the lower extremities bilaterally  11. Is Myoview 2019-10.1 mets 69% age-predicted maximum heart rate baseline blood pressure 146/70 excellent blood pressure 164/60, switched to Lexiscan Myoview LVEF 51% normal perfusion.  12. Chronic muscle pain different types, no objective data to go along with it, including inflammatory markers which were all negative.  13. Stress Myoview February 2023-fixed inferior wall defect with no reversibility LVEF 52% no diagnostic ST-T abnormality with exercise, chronotropic blunting, achieved 80% of age-predicted maximum heart rate    Objective   Wt Readings from Last 3 Encounters:   05/27/25 103 kg (227 lb 3.2 oz)   05/14/24 102 kg (225 lb 6.4 oz)   08/08/23 102 kg (224 lb)        Vitals:    05/27/25 1530   BP: 138/62   BP Location: Left arm   Patient Position: Sitting   Pulse: 63   Weight: 103 kg (227 lb 3.2 oz)   Height: 1.93 m (6' 4\")           Physical Exam:    GENERAL APPEARANCE: in no acute distress.  CHEST: Symmetric and non-tender.  INTEGUMENT: Skin warm and dry  HEENT: No gross abnormalities identified.No pallor or scleral icterus.  NECK: Supple, no JVD, no bruit.   NEURO/PSHCY: Alert and oriented x3; appropriate behavior and responses and responses  LUNGS: Clear to auscultation bilaterally; normal respiratory effort.  HEART: Rate and rhythm regular with no evident murmur; no gallop " appreciated.   ABDOMEN: Soft, non tender.  MUSCULOSKELETAL: No gross deformities.  EXTREMITIES: Warm  There is no edema noted.    Meds:  Current Outpatient Medications   Medication Instructions    amLODIPine (NORVASC) 5 mg, oral, Daily    aspirin 81 mg EC tablet 1 tablet, Daily    carvedilol (COREG) 3.125 mg, oral, 2 times daily (morning and late afternoon)    cyanocobalamin (Vitamin B-12) 1,000 mcg tablet 1 tablet, Daily    Leqvio 284 mg, subcutaneous, Every 3 months    multivitamin tablet 1 tablet, Daily    nitroglycerin (NITROSTAT) 0.4 mg, sublingual, Every 5 min PRN    predniSONE (DELTASONE) 10 mg, Daily    Synthroid 100 mcg tablet 1 tablet, Daily        Allergies:  Ticagrelor, Ezetimibe, Other, Rosuvastatin, and Simvastatin      LABS:      Testing Reviewed  HgBA1c:    Lab Results   Component Value Date    HGBA1C 5.6 08/06/2024 September 2024-sodium 132 potassium 4.8 BUN 16 creatinine 1.14 GFR 66.7 liver enzymes normal albumin 3.7 hemoglobin 11.2 hematocrit 34.5 MCV 92.7 platelets 308,000.    Reviewed all available pertinent laboratory data and diagnostic testing results that occurred after the last office visit with me                Assessment:    1. Myalgia  Follow Up In Cardiology    Follow Up In Cardiology      2. Mixed hyperlipidemia  Follow Up In Cardiology    Follow Up In Cardiology      3. Primary hypertension  Follow Up In Cardiology    Follow Up In Cardiology      4. Statin intolerance  Follow Up In Cardiology    Follow Up In Cardiology      5. Hypertension, unspecified type  Follow Up In Cardiology    Follow Up In Cardiology    Nuclear Stress Test      6. Coronary artery disease involving native coronary artery of native heart without angina pectoris  Follow Up In Cardiology    Nuclear Stress Test      7. Chronotropic incompetence  Follow Up In Cardiology    Nuclear Stress Test           Clinical Decision Making: Patient is requesting a follow-up stress test, he did have good level of exercise  despite chronotropic blunting, a treadmill stress test was ordered.  He will call for the results    Follow up : 6 months                IJoesph LPN am scribing for, and in the presence of Dr. Natalya Frazier MD, FACC.       I, Dr. Natalya Frazier MD, FACC, personally performed the services described in the documentation as scribed by Joesph PERSAUD in my presence, and confirm it is both accurate and complete.

## 2025-05-27 NOTE — PATIENT INSTRUCTIONS
Continue same medications and treatments.     Please bring any lab results from other providers / physicians to your next appointment.     Please bring all medicines, vitamins, and herbal supplements with you when you come to the office.     Prescriptions will not be filled unless you are compliant with your follow up appointments or have a follow up appointment scheduled as per instruction of your physician. Refills should be requested at the time of your visit.      DID YOU KNOW:  We have a pharmacy here in the [facility].  They can fill all prescriptions, not just cardiac medications.  Prescriptions from other pharmacies can easily be transferred to the  pharmacy by the  pharmacist on site.   pharmacies offer FREE HOME DELIVERY on medications to anywhere in [place]. They can sync your medications. Typically prescriptions can be ready in 10 - 15 minutes. If pharmacy is unable to fill your  prescription or if cost is more than your paying now the Pharmacist can easily transfer back to your Pharmacy of choice. Pharmacy phone # [phone].

## 2025-07-02 ENCOUNTER — OFFICE VISIT (OUTPATIENT)
Age: 75
End: 2025-07-02
Payer: MEDICARE

## 2025-07-02 VITALS
OXYGEN SATURATION: 97 % | SYSTOLIC BLOOD PRESSURE: 130 MMHG | HEART RATE: 56 BPM | DIASTOLIC BLOOD PRESSURE: 68 MMHG | HEIGHT: 75 IN | BODY MASS INDEX: 27.38 KG/M2 | TEMPERATURE: 97.8 F | WEIGHT: 220.2 LBS

## 2025-07-02 DIAGNOSIS — R42 LIGHTHEADEDNESS: Primary | ICD-10-CM

## 2025-07-02 DIAGNOSIS — E03.9 HYPOTHYROIDISM, UNSPECIFIED TYPE: ICD-10-CM

## 2025-07-02 DIAGNOSIS — M35.3 PMR (POLYMYALGIA RHEUMATICA): ICD-10-CM

## 2025-07-02 DIAGNOSIS — I73.9 LEFT LEG CLAUDICATION: ICD-10-CM

## 2025-07-02 PROCEDURE — G8417 CALC BMI ABV UP PARAM F/U: HCPCS | Performed by: FAMILY MEDICINE

## 2025-07-02 PROCEDURE — 1159F MED LIST DOCD IN RCRD: CPT | Performed by: FAMILY MEDICINE

## 2025-07-02 PROCEDURE — G8427 DOCREV CUR MEDS BY ELIG CLIN: HCPCS | Performed by: FAMILY MEDICINE

## 2025-07-02 PROCEDURE — 99214 OFFICE O/P EST MOD 30 MIN: CPT | Performed by: FAMILY MEDICINE

## 2025-07-02 PROCEDURE — 1123F ACP DISCUSS/DSCN MKR DOCD: CPT | Performed by: FAMILY MEDICINE

## 2025-07-02 PROCEDURE — 1036F TOBACCO NON-USER: CPT | Performed by: FAMILY MEDICINE

## 2025-07-02 PROCEDURE — 3075F SYST BP GE 130 - 139MM HG: CPT | Performed by: FAMILY MEDICINE

## 2025-07-02 PROCEDURE — 3078F DIAST BP <80 MM HG: CPT | Performed by: FAMILY MEDICINE

## 2025-07-02 RX ORDER — TAMSULOSIN HYDROCHLORIDE 0.4 MG/1
0.4 CAPSULE ORAL DAILY
COMMUNITY
Start: 2025-05-05

## 2025-07-02 NOTE — PROGRESS NOTES
[facility]  [facility]  [address] [address]  Dept: [phone]  Dept Fax: [phone]  Loc: [phone]     Subjective  Isidro Kaye, 74 y.o. male Established patient presents today with:  Chief Complaint   Patient presents with    Numbness     Patient c/o numbness bilateral feet    Dizziness     Dizziness x1 month. Patient c/o SOB, denies changes in vision, nausea, vomiting.     Discuss Labs     4.8.25       History of Present Illness  The patient presents for evaluation of dizziness, polymyalgia rheumatica (PMR), and cholesterol management.    He experiences numbness in his feet, which he believes is due to poor blood circulation. He also reports occasional dizziness upon exertion, such as walking or standing up, with some days being more severe than others. His legs do not feel cold, but he struggles with activities like biking to the parking lot. He feels dizzy when he stops biking. He also experiences fatigue and heaviness in his legs when walking short distances, such as from his chair to the kitchen. He has no history of leg blockages. He is scheduled for a stress test in a few weeks and is unsure if it will assess his leg function. He reports no headaches but mentions recent neck discomfort.    He has been on Leqvio for cholesterol management for approximately 1.5 years, which has resulted in an HDL level of 45, the best lipid panel he has ever had.    He has been taking prednisone for over a year and is unable to discontinue its use. He was previously under the care of Dr. Jensen in [place], who has since left. He attempted to reduce his prednisone intake but experienced a flare-up of his PMR symptoms, including pain in his shoulders, hips, and now his left wrist. He expresses a desire to discontinue prednisone due to concerns about his adrenal gland function. He is currently on

## 2025-07-07 ENCOUNTER — ANCILLARY PROCEDURE (OUTPATIENT)
Dept: CARDIOLOGY | Facility: HOSPITAL | Age: 75
End: 2025-07-07
Payer: MEDICARE

## 2025-07-07 DIAGNOSIS — I45.89 CHRONOTROPIC INCOMPETENCE: ICD-10-CM

## 2025-07-07 DIAGNOSIS — I10 PRIMARY HYPERTENSION: ICD-10-CM

## 2025-07-07 DIAGNOSIS — I10 HYPERTENSION, UNSPECIFIED TYPE: ICD-10-CM

## 2025-07-07 DIAGNOSIS — I25.10 CORONARY ARTERY DISEASE INVOLVING NATIVE CORONARY ARTERY OF NATIVE HEART WITHOUT ANGINA PECTORIS: Primary | ICD-10-CM

## 2025-07-07 DIAGNOSIS — I25.10 CORONARY ARTERY DISEASE INVOLVING NATIVE CORONARY ARTERY OF NATIVE HEART WITHOUT ANGINA PECTORIS: ICD-10-CM

## 2025-07-07 PROCEDURE — A9502 TC99M TETROFOSMIN: HCPCS | Performed by: INTERNAL MEDICINE

## 2025-07-07 PROCEDURE — 3430000001 HC RX 343 DIAGNOSTIC RADIOPHARMACEUTICALS: Performed by: INTERNAL MEDICINE

## 2025-07-07 PROCEDURE — 78452 HT MUSCLE IMAGE SPECT MULT: CPT | Performed by: INTERNAL MEDICINE

## 2025-07-07 PROCEDURE — 78452 HT MUSCLE IMAGE SPECT MULT: CPT

## 2025-07-07 PROCEDURE — 93016 CV STRESS TEST SUPVJ ONLY: CPT | Performed by: INTERNAL MEDICINE

## 2025-07-07 PROCEDURE — 2500000004 HC RX 250 GENERAL PHARMACY W/ HCPCS (ALT 636 FOR OP/ED): Performed by: INTERNAL MEDICINE

## 2025-07-07 PROCEDURE — 93018 CV STRESS TEST I&R ONLY: CPT | Performed by: INTERNAL MEDICINE

## 2025-07-07 RX ORDER — REGADENOSON 0.08 MG/ML
0.4 INJECTION, SOLUTION INTRAVENOUS ONCE
Status: COMPLETED | OUTPATIENT
Start: 2025-07-07 | End: 2025-07-07

## 2025-07-07 RX ADMIN — REGADENOSON 0.4 MG: 0.08 INJECTION, SOLUTION INTRAVENOUS at 09:31

## 2025-07-07 RX ADMIN — TETROFOSMIN 34 MILLICURIE: 0.23 INJECTION, POWDER, LYOPHILIZED, FOR SOLUTION INTRAVENOUS at 09:27

## 2025-07-07 RX ADMIN — TETROFOSMIN 11 MILLICURIE: 0.23 INJECTION, POWDER, LYOPHILIZED, FOR SOLUTION INTRAVENOUS at 07:35

## 2025-07-10 ENCOUNTER — APPOINTMENT (OUTPATIENT)
Dept: INFUSION THERAPY | Facility: CLINIC | Age: 75
End: 2025-07-10
Payer: MEDICARE

## 2025-07-14 ENCOUNTER — RESULTS FOLLOW-UP (OUTPATIENT)
Age: 75
End: 2025-07-14

## 2025-07-25 ENCOUNTER — HOSPITAL ENCOUNTER (OUTPATIENT)
Dept: ULTRASOUND IMAGING | Age: 75
Discharge: HOME | End: 2025-07-27
Payer: MEDICARE

## 2025-07-25 DIAGNOSIS — I73.9 LEFT LEG CLAUDICATION: Primary | ICD-10-CM

## 2025-07-25 DIAGNOSIS — I73.9 LEFT LEG CLAUDICATION: ICD-10-CM

## 2025-07-25 PROCEDURE — 93925 LOWER EXTREMITY STUDY: CPT

## 2025-07-30 ENCOUNTER — OFFICE VISIT (OUTPATIENT)
Age: 75
End: 2025-07-30
Payer: MEDICARE

## 2025-07-30 VITALS
HEART RATE: 63 BPM | HEIGHT: 75 IN | TEMPERATURE: 97.7 F | WEIGHT: 222.6 LBS | BODY MASS INDEX: 27.68 KG/M2 | SYSTOLIC BLOOD PRESSURE: 132 MMHG | DIASTOLIC BLOOD PRESSURE: 68 MMHG | OXYGEN SATURATION: 95 %

## 2025-07-30 DIAGNOSIS — E03.9 HYPOTHYROIDISM, UNSPECIFIED TYPE: ICD-10-CM

## 2025-07-30 DIAGNOSIS — M35.3 PMR (POLYMYALGIA RHEUMATICA): Primary | ICD-10-CM

## 2025-07-30 DIAGNOSIS — R42 LIGHTHEADEDNESS: ICD-10-CM

## 2025-07-30 PROCEDURE — G8417 CALC BMI ABV UP PARAM F/U: HCPCS | Performed by: FAMILY MEDICINE

## 2025-07-30 PROCEDURE — 3078F DIAST BP <80 MM HG: CPT | Performed by: FAMILY MEDICINE

## 2025-07-30 PROCEDURE — 1123F ACP DISCUSS/DSCN MKR DOCD: CPT | Performed by: FAMILY MEDICINE

## 2025-07-30 PROCEDURE — 1036F TOBACCO NON-USER: CPT | Performed by: FAMILY MEDICINE

## 2025-07-30 PROCEDURE — 99214 OFFICE O/P EST MOD 30 MIN: CPT | Performed by: FAMILY MEDICINE

## 2025-07-30 PROCEDURE — 3075F SYST BP GE 130 - 139MM HG: CPT | Performed by: FAMILY MEDICINE

## 2025-07-30 PROCEDURE — 1159F MED LIST DOCD IN RCRD: CPT | Performed by: FAMILY MEDICINE

## 2025-07-30 PROCEDURE — G8427 DOCREV CUR MEDS BY ELIG CLIN: HCPCS | Performed by: FAMILY MEDICINE

## 2025-07-30 NOTE — PROGRESS NOTES
to clarify.    The patient (or guardian, if applicable) and other individuals in attendance with the patient were advised that Artificial Intelligence will be utilized during this visit to record, process the conversation to generate a clinical note, and support improvement of the AI technology. The patient (or guardian, if applicable) and other individuals in attendance at the appointment consented to the use of AI, including the recording.      Rui Shane MD

## 2025-07-31 ENCOUNTER — APPOINTMENT (OUTPATIENT)
Dept: INFUSION THERAPY | Facility: CLINIC | Age: 75
End: 2025-07-31
Payer: MEDICARE

## 2025-07-31 VITALS
OXYGEN SATURATION: 98 % | HEART RATE: 71 BPM | DIASTOLIC BLOOD PRESSURE: 66 MMHG | SYSTOLIC BLOOD PRESSURE: 127 MMHG | TEMPERATURE: 97.2 F | RESPIRATION RATE: 18 BRPM

## 2025-07-31 DIAGNOSIS — E78.2 MIXED HYPERLIPIDEMIA: ICD-10-CM

## 2025-07-31 DIAGNOSIS — Z78.9 STATIN INTOLERANCE: ICD-10-CM

## 2025-07-31 PROCEDURE — 96372 THER/PROPH/DIAG INJ SC/IM: CPT | Performed by: REGISTERED NURSE

## 2025-07-31 ASSESSMENT — ENCOUNTER SYMPTOMS
COUGH: 0
NUMBNESS: 0
PALPITATIONS: 0
LIGHT-HEADEDNESS: 1
SHORTNESS OF BREATH: 0
EXTREMITY WEAKNESS: 0
LEG SWELLING: 0
WOUND: 0
DIZZINESS: 1
WHEEZING: 0

## 2025-07-31 NOTE — PATIENT INSTRUCTIONS
Today you received:   LEQVIO 284 MG subcutaneous INJECTION.    NEXT APPT 6 MONTHS.    For:    1. Mixed hyperlipidemia    2. Statin intolerance            Please read the  Medication Guide that was given to you and reviewed during todays visit.     (Tell all doctors including dentists that you are taking this medication)     Go to the emergency room or call 911 if:  -You have signs of allergic reaction:   o         Rash, hives, itching.   o         Swollen, blistered, peeling skin.   o         Swelling of face, lips, mouth, tongue or throat.   o         Tightness of chest, trouble breathing, swallowing or talking      Call your doctor:     - If IV / injection site gets red, warm, swollen, itchy or leaks fluid or pus.     (Leave dressing on your IV site for at least 2 hours and keep area clean and dry    - If you get sick or have symptoms of infection or are not feeling well for any reason.    (Wash your hands often, stay away from people who are sick)    - If you have side effects from your medication that do not go away or are bothersome.     (Refer to the teaching your nurse gave you for side effects to call your doctor about)     Common side effects may include:  stuffy nose, headache, feeling tired, muscle aches, upset stomach    - Before receiving any vaccines, Call the [facility] at [phone]     - You get sick, are on antibiotics, have had a recent vaccine, have surgery or dental work and your doctor wants your visit rescheduled.    - You need to cancel and reschedule your visit for any reason. Call at least 2 days before your visit if you need to cancel.     - Your insurance changes before your next visit.    (We will need to get approval from your new insurance. This can take up to two weeks.)     The [facility] is opened Monday thru Friday 8am-8pm and Saturday 8am-4:30pm. We are closed on holidays.     Voice mail will take your call if the center is closed. If you leave  a message please allow 24 hours for a call back during weekdays. If you leave a message on a weekend/holiday, we will call you back the next business day.

## 2025-07-31 NOTE — PROGRESS NOTES
[facility]   Infusion Clinic Note   Date: 2025   Name: Jose Alejandro Tello  : 1950   MRN: [id number]         Reason for Visit: Injections (LEQVIO 284 MG subcutaneous INJECTION. Q 6 MONTHS.)         Today: We administered inclisiran.       Referring Provider: Natalya Frazier MD    Plan Provider: Natalya Frazier MD      For a Diagnosis of: Mixed hyperlipidemia    Statin intolerance       At today's visit patient accompanied by: Self      Today's Vitals:   Vitals:    25 0815   BP: 127/66   Pulse: 71   Resp: 18   Temp: 36.2 °C (97.2 °F)   SpO2: 98%             Pre - Treatment Checklist:      - Previous reaction to current treatment: no      (Assess patient for the concerns below. Document provider notification as appropriate).  - Active or recent infection with/without current antibiotic use: no  - Recent or planned invasive dental work: no  - Recent or planned surgeries: no  - Recently received or plans to receive vaccinations: no  - Has treatment related toxicities: no  - Any chance may be pregnant:  n/a      Pain: 5   - Is the pain different from normal: yes   - Is prescribing Doctor aware:  yes      Labs: Reviewed       Fall Risk Screening: Green Fall Risk  History of Falling, Immediate or Within 3 Months: No  Secondary Diagnosis: No  Ambulatory Aid: Walks without aid/bedrest/nurse assist  Intravenous Therapy/Heparin Lock: No  Gait/Transferring: Normal/bedrest/immobile  Mental Status: Oriented to own ability  Green Fall Risk Score: 0            Review Of Systems:  Review of Systems   Respiratory:  Negative for cough, shortness of breath and wheezing.    Cardiovascular:  Negative for chest pain, leg swelling and palpitations.   Skin:  Negative for itching, rash and wound.   Neurological:  Positive for dizziness (upcoming appt w cardiology) and light-headedness. Negative for extremity weakness and numbness.         Infusion Readiness:  - Assessment Concerns Related to Infusion:  "No  - Provider notified: no      New Patient Education:    N/A (returning patient for continuation of therapy. Ongoing education provided as needed.)    Pt travels in winter and receives leqvio in another [place] when its due.        Treatment Conditions & Drug Specific Questions:    Inclisiran (LEQVIO)     (Unless otherwise specified on patient specific therapy plan):     REMINDER:  Recommended Vitals/Observation:  Vitals: Obtain vital signs prior to injection and at end of observation period.    Observation: Patient may leave 30 minutes after the FIRST injection. Patient may leave immediately following injection for all subsequent doses    No results found for: \"CHOL\"  No results found for: \"HDL\"  No results found for: \"LDLCALC\"  No results found for: \"TRIG\"  No components found for: \"CHOLHDL\"         Weight Based Drug Calculations:    WEIGHT BASED DRUGS: NOT APPLICABLE / FLAT DOSE       Post Treatment: Patient tolerated treatment without issue and was discharged in no apparent distress.      Note Authored / Patient Cared for By: Jaz Welch RN        "

## 2025-08-20 ENCOUNTER — APPOINTMENT (OUTPATIENT)
Dept: CARDIOLOGY | Facility: CLINIC | Age: 75
End: 2025-08-20
Payer: MEDICARE

## 2025-08-20 VITALS
SYSTOLIC BLOOD PRESSURE: 114 MMHG | BODY MASS INDEX: 27.11 KG/M2 | HEART RATE: 60 BPM | HEIGHT: 76 IN | DIASTOLIC BLOOD PRESSURE: 64 MMHG | WEIGHT: 222.6 LBS

## 2025-08-20 DIAGNOSIS — Z71.2 ENCOUNTER TO DISCUSS TEST RESULTS: ICD-10-CM

## 2025-08-20 DIAGNOSIS — R42 DIZZINESS: ICD-10-CM

## 2025-08-20 DIAGNOSIS — I45.89 CHRONOTROPIC INCOMPETENCE: ICD-10-CM

## 2025-08-20 DIAGNOSIS — Z78.9 STATIN INTOLERANCE: ICD-10-CM

## 2025-08-20 DIAGNOSIS — I45.2 BIFASCICULAR BLOCK: ICD-10-CM

## 2025-08-20 DIAGNOSIS — I25.10 CORONARY ARTERY DISEASE INVOLVING NATIVE CORONARY ARTERY OF NATIVE HEART WITHOUT ANGINA PECTORIS: ICD-10-CM

## 2025-08-20 PROBLEM — R94.39 CARDIOVASCULAR STRESS TEST ABNORMAL: Status: ACTIVE | Noted: 2025-08-20

## 2025-08-20 PROCEDURE — 1160F RVW MEDS BY RX/DR IN RCRD: CPT | Performed by: INTERNAL MEDICINE

## 2025-08-20 PROCEDURE — 1159F MED LIST DOCD IN RCRD: CPT | Performed by: INTERNAL MEDICINE

## 2025-08-20 PROCEDURE — 99214 OFFICE O/P EST MOD 30 MIN: CPT | Performed by: INTERNAL MEDICINE

## 2025-08-20 PROCEDURE — G2211 COMPLEX E/M VISIT ADD ON: HCPCS | Performed by: INTERNAL MEDICINE

## 2025-08-20 PROCEDURE — 3078F DIAST BP <80 MM HG: CPT | Performed by: INTERNAL MEDICINE

## 2025-08-20 PROCEDURE — 3074F SYST BP LT 130 MM HG: CPT | Performed by: INTERNAL MEDICINE

## 2025-08-21 ENCOUNTER — TELEPHONE (OUTPATIENT)
Dept: CARDIOLOGY | Facility: CLINIC | Age: 75
End: 2025-08-21
Payer: MEDICARE

## 2025-08-21 DIAGNOSIS — I45.10 UNSPECIFIED RIGHT BUNDLE-BRANCH BLOCK: ICD-10-CM

## 2025-08-21 DIAGNOSIS — I45.89 CHRONOTROPIC INCOMPETENCE: ICD-10-CM

## 2025-08-26 ENCOUNTER — OFFICE VISIT (OUTPATIENT)
Age: 75
End: 2025-08-26
Payer: MEDICARE

## 2025-08-26 VITALS
SYSTOLIC BLOOD PRESSURE: 137 MMHG | RESPIRATION RATE: 18 BRPM | WEIGHT: 223 LBS | DIASTOLIC BLOOD PRESSURE: 71 MMHG | HEIGHT: 75 IN | TEMPERATURE: 97.5 F | BODY MASS INDEX: 27.73 KG/M2 | OXYGEN SATURATION: 98 % | HEART RATE: 60 BPM

## 2025-08-26 DIAGNOSIS — J84.10 PULMONARY FIBROSIS (HCC): Primary | ICD-10-CM

## 2025-08-26 DIAGNOSIS — G47.33 OSA (OBSTRUCTIVE SLEEP APNEA): ICD-10-CM

## 2025-08-26 DIAGNOSIS — M35.3 PMR (POLYMYALGIA RHEUMATICA): ICD-10-CM

## 2025-08-26 DIAGNOSIS — J44.9 CHRONIC OBSTRUCTIVE PULMONARY DISEASE, UNSPECIFIED COPD TYPE (HCC): ICD-10-CM

## 2025-08-26 PROCEDURE — 1123F ACP DISCUSS/DSCN MKR DOCD: CPT | Performed by: INTERNAL MEDICINE

## 2025-08-26 PROCEDURE — 3023F SPIROM DOC REV: CPT | Performed by: INTERNAL MEDICINE

## 2025-08-26 PROCEDURE — G8417 CALC BMI ABV UP PARAM F/U: HCPCS | Performed by: INTERNAL MEDICINE

## 2025-08-26 PROCEDURE — 3075F SYST BP GE 130 - 139MM HG: CPT | Performed by: INTERNAL MEDICINE

## 2025-08-26 PROCEDURE — G8427 DOCREV CUR MEDS BY ELIG CLIN: HCPCS | Performed by: INTERNAL MEDICINE

## 2025-08-26 PROCEDURE — 1159F MED LIST DOCD IN RCRD: CPT | Performed by: INTERNAL MEDICINE

## 2025-08-26 PROCEDURE — 1036F TOBACCO NON-USER: CPT | Performed by: INTERNAL MEDICINE

## 2025-08-26 PROCEDURE — 99214 OFFICE O/P EST MOD 30 MIN: CPT | Performed by: INTERNAL MEDICINE

## 2025-08-26 PROCEDURE — 3078F DIAST BP <80 MM HG: CPT | Performed by: INTERNAL MEDICINE

## 2025-08-26 RX ORDER — BACLOFEN 10 MG/1
TABLET ORAL
COMMUNITY
Start: 2025-07-31

## 2025-09-03 ENCOUNTER — HOSPITAL ENCOUNTER (OUTPATIENT)
Dept: CT IMAGING | Age: 75
Discharge: HOME | End: 2025-09-05
Attending: INTERNAL MEDICINE
Payer: MEDICARE

## 2025-09-03 DIAGNOSIS — J84.10 PULMONARY FIBROSIS (HCC): ICD-10-CM

## 2025-09-03 PROCEDURE — 71250 CT THORAX DX C-: CPT

## 2025-09-22 ENCOUNTER — APPOINTMENT (OUTPATIENT)
Facility: CLINIC | Age: 75
End: 2025-09-22
Payer: MEDICARE

## 2025-12-02 ENCOUNTER — APPOINTMENT (OUTPATIENT)
Dept: CARDIOLOGY | Facility: CLINIC | Age: 75
End: 2025-12-02
Payer: MEDICARE

## 2026-05-19 ENCOUNTER — APPOINTMENT (OUTPATIENT)
Dept: CARDIOLOGY | Facility: CLINIC | Age: 76
End: 2026-05-19
Payer: MEDICARE